# Patient Record
Sex: FEMALE | Race: WHITE | Employment: FULL TIME | URBAN - NONMETROPOLITAN AREA
[De-identification: names, ages, dates, MRNs, and addresses within clinical notes are randomized per-mention and may not be internally consistent; named-entity substitution may affect disease eponyms.]

---

## 2019-06-13 ENCOUNTER — APPOINTMENT (OUTPATIENT)
Dept: GENERAL RADIOLOGY | Age: 39
End: 2019-06-13
Payer: COMMERCIAL

## 2019-06-13 ENCOUNTER — APPOINTMENT (OUTPATIENT)
Dept: CT IMAGING | Age: 39
End: 2019-06-13
Payer: COMMERCIAL

## 2019-06-13 ENCOUNTER — HOSPITAL ENCOUNTER (EMERGENCY)
Age: 39
Discharge: HOME | End: 2019-06-14
Attending: EMERGENCY MEDICINE
Payer: COMMERCIAL

## 2019-06-13 DIAGNOSIS — K29.90 GASTRITIS AND DUODENITIS: Primary | ICD-10-CM

## 2019-06-13 LAB
ABSOLUTE EOS #: 1.4 K/UL (ref 0–0.4)
ABSOLUTE IMMATURE GRANULOCYTE: ABNORMAL K/UL (ref 0–0.3)
ABSOLUTE LYMPH #: 2.4 K/UL (ref 1–4.8)
ABSOLUTE MONO #: 1 K/UL (ref 0.1–1.2)
ALBUMIN SERPL-MCNC: 4 G/DL (ref 3.5–5.2)
ALBUMIN/GLOBULIN RATIO: 1.5 (ref 1–2.5)
ALP BLD-CCNC: 104 U/L (ref 35–104)
ALT SERPL-CCNC: 13 U/L (ref 5–33)
AMYLASE: 34 U/L (ref 28–100)
ANION GAP SERPL CALCULATED.3IONS-SCNC: 13 MMOL/L (ref 9–17)
AST SERPL-CCNC: 25 U/L
BASOPHILS # BLD: 1 % (ref 0–1)
BASOPHILS ABSOLUTE: 0.1 K/UL (ref 0–0.2)
BILIRUB SERPL-MCNC: 0.29 MG/DL (ref 0.3–1.2)
BILIRUBIN DIRECT: 0.08 MG/DL
BILIRUBIN, INDIRECT: 0.21 MG/DL (ref 0–1)
BUN BLDV-MCNC: 11 MG/DL (ref 6–20)
BUN/CREAT BLD: 17 (ref 9–20)
CALCIUM SERPL-MCNC: 8.7 MG/DL (ref 8.6–10.4)
CHLORIDE BLD-SCNC: 102 MMOL/L (ref 98–107)
CO2: 23 MMOL/L (ref 20–31)
CREAT SERPL-MCNC: 0.63 MG/DL (ref 0.5–0.9)
DIFFERENTIAL TYPE: ABNORMAL
EOSINOPHILS RELATIVE PERCENT: 9 % (ref 1–7)
GFR AFRICAN AMERICAN: >60 ML/MIN
GFR NON-AFRICAN AMERICAN: >60 ML/MIN
GFR SERPL CREATININE-BSD FRML MDRD: ABNORMAL ML/MIN/{1.73_M2}
GFR SERPL CREATININE-BSD FRML MDRD: ABNORMAL ML/MIN/{1.73_M2}
GLOBULIN: 2.7 G/DL (ref 1.5–3.8)
GLUCOSE BLD-MCNC: 122 MG/DL (ref 70–99)
HCG QUALITATIVE: NEGATIVE
HCT VFR BLD CALC: 39.3 % (ref 36–46)
HEMOGLOBIN: 13.4 G/DL (ref 12–16)
IMMATURE GRANULOCYTES: ABNORMAL %
LIPASE: 45 U/L (ref 13–60)
LYMPHOCYTES # BLD: 17 % (ref 16–46)
MCH RBC QN AUTO: 29.8 PG (ref 26–34)
MCHC RBC AUTO-ENTMCNC: 34.1 G/DL (ref 31–37)
MCV RBC AUTO: 87.4 FL (ref 80–100)
MONOCYTES # BLD: 7 % (ref 4–11)
NRBC AUTOMATED: ABNORMAL PER 100 WBC
PDW BLD-RTO: 13.2 % (ref 11–14.5)
PLATELET # BLD: 306 K/UL (ref 140–450)
PLATELET ESTIMATE: ABNORMAL
PMV BLD AUTO: 7.8 FL (ref 6–12)
POTASSIUM SERPL-SCNC: 3.9 MMOL/L (ref 3.7–5.3)
RBC # BLD: 4.5 M/UL (ref 4–5.2)
RBC # BLD: ABNORMAL 10*6/UL
SEG NEUTROPHILS: 66 % (ref 43–77)
SEGMENTED NEUTROPHILS ABSOLUTE COUNT: 9.8 K/UL (ref 1.8–7.7)
SODIUM BLD-SCNC: 138 MMOL/L (ref 135–144)
TOTAL PROTEIN: 6.7 G/DL (ref 6.4–8.3)
TROPONIN INTERP: NORMAL
TROPONIN T: NORMAL NG/ML
TROPONIN, HIGH SENSITIVITY: <6 NG/L (ref 0–14)
WBC # BLD: 14.7 K/UL (ref 3.5–11)
WBC # BLD: ABNORMAL 10*3/UL

## 2019-06-13 PROCEDURE — 81001 URINALYSIS AUTO W/SCOPE: CPT

## 2019-06-13 PROCEDURE — 82150 ASSAY OF AMYLASE: CPT

## 2019-06-13 PROCEDURE — 80076 HEPATIC FUNCTION PANEL: CPT

## 2019-06-13 PROCEDURE — 93005 ELECTROCARDIOGRAM TRACING: CPT | Performed by: EMERGENCY MEDICINE

## 2019-06-13 PROCEDURE — 2709999900 CT ABDOMEN PELVIS W IV CONTRAST

## 2019-06-13 PROCEDURE — 36415 COLL VENOUS BLD VENIPUNCTURE: CPT

## 2019-06-13 PROCEDURE — 2580000003 HC RX 258: Performed by: EMERGENCY MEDICINE

## 2019-06-13 PROCEDURE — 71045 X-RAY EXAM CHEST 1 VIEW: CPT

## 2019-06-13 PROCEDURE — 80048 BASIC METABOLIC PNL TOTAL CA: CPT

## 2019-06-13 PROCEDURE — 84703 CHORIONIC GONADOTROPIN ASSAY: CPT

## 2019-06-13 PROCEDURE — 96375 TX/PRO/DX INJ NEW DRUG ADDON: CPT

## 2019-06-13 PROCEDURE — 84484 ASSAY OF TROPONIN QUANT: CPT

## 2019-06-13 PROCEDURE — 6360000002 HC RX W HCPCS: Performed by: EMERGENCY MEDICINE

## 2019-06-13 PROCEDURE — 83690 ASSAY OF LIPASE: CPT

## 2019-06-13 PROCEDURE — 6360000004 HC RX CONTRAST MEDICATION: Performed by: EMERGENCY MEDICINE

## 2019-06-13 PROCEDURE — 99284 EMERGENCY DEPT VISIT MOD MDM: CPT

## 2019-06-13 PROCEDURE — 96374 THER/PROPH/DIAG INJ IV PUSH: CPT

## 2019-06-13 PROCEDURE — 85025 COMPLETE CBC W/AUTO DIFF WBC: CPT

## 2019-06-13 RX ORDER — ONDANSETRON 2 MG/ML
4 INJECTION INTRAMUSCULAR; INTRAVENOUS ONCE
Status: COMPLETED | OUTPATIENT
Start: 2019-06-13 | End: 2019-06-13

## 2019-06-13 RX ORDER — MORPHINE SULFATE 4 MG/ML
4 INJECTION, SOLUTION INTRAMUSCULAR; INTRAVENOUS ONCE
Status: COMPLETED | OUTPATIENT
Start: 2019-06-13 | End: 2019-06-13

## 2019-06-13 RX ORDER — 0.9 % SODIUM CHLORIDE 0.9 %
1000 INTRAVENOUS SOLUTION INTRAVENOUS ONCE
Status: COMPLETED | OUTPATIENT
Start: 2019-06-13 | End: 2019-06-14

## 2019-06-13 RX ADMIN — IOPAMIDOL 100 ML: 755 INJECTION, SOLUTION INTRAVENOUS at 23:50

## 2019-06-13 RX ADMIN — SODIUM CHLORIDE 1000 ML: 9 INJECTION, SOLUTION INTRAVENOUS at 22:59

## 2019-06-13 RX ADMIN — MORPHINE SULFATE 4 MG: 4 INJECTION INTRAVENOUS at 22:58

## 2019-06-13 RX ADMIN — ONDANSETRON 4 MG: 2 INJECTION INTRAMUSCULAR; INTRAVENOUS at 22:58

## 2019-06-13 ASSESSMENT — PAIN DESCRIPTION - PAIN TYPE: TYPE: ACUTE PAIN

## 2019-06-13 ASSESSMENT — PAIN SCALES - GENERAL
PAINLEVEL_OUTOF10: 9
PAINLEVEL_OUTOF10: 4

## 2019-06-13 ASSESSMENT — PAIN DESCRIPTION - LOCATION: LOCATION: ABDOMEN

## 2019-06-13 ASSESSMENT — PAIN DESCRIPTION - ORIENTATION: ORIENTATION: UPPER

## 2019-06-13 ASSESSMENT — PAIN DESCRIPTION - PROGRESSION: CLINICAL_PROGRESSION: GRADUALLY IMPROVING

## 2019-06-14 VITALS
RESPIRATION RATE: 18 BRPM | OXYGEN SATURATION: 98 % | HEART RATE: 89 BPM | DIASTOLIC BLOOD PRESSURE: 63 MMHG | WEIGHT: 263 LBS | TEMPERATURE: 97.9 F | SYSTOLIC BLOOD PRESSURE: 140 MMHG | HEIGHT: 67 IN | BODY MASS INDEX: 41.28 KG/M2

## 2019-06-14 LAB
-: NORMAL
AMORPHOUS: NORMAL
BACTERIA: NORMAL
BILIRUBIN URINE: NEGATIVE
CASTS UA: NORMAL /LPF (ref 0–2)
COLOR: ABNORMAL
COMMENT UA: ABNORMAL
CRYSTALS, UA: NORMAL /HPF
EKG ATRIAL RATE: 101 BPM
EKG P AXIS: 45 DEGREES
EKG P-R INTERVAL: 126 MS
EKG Q-T INTERVAL: 348 MS
EKG QRS DURATION: 82 MS
EKG QTC CALCULATION (BAZETT): 451 MS
EKG R AXIS: 42 DEGREES
EKG T AXIS: 29 DEGREES
EKG VENTRICULAR RATE: 101 BPM
EPITHELIAL CELLS UA: NORMAL /HPF (ref 0–5)
GLUCOSE URINE: NEGATIVE
KETONES, URINE: NEGATIVE
LEUKOCYTE ESTERASE, URINE: NEGATIVE
MUCUS: NORMAL
NITRITE, URINE: NEGATIVE
OTHER OBSERVATIONS UA: NORMAL
PH UA: 7 (ref 5–6)
PROTEIN UA: NEGATIVE
RBC UA: NORMAL /HPF (ref 0–4)
RENAL EPITHELIAL, UA: NORMAL /HPF
SPECIFIC GRAVITY UA: 1.02 (ref 1.01–1.02)
TRICHOMONAS: NORMAL
TURBIDITY: ABNORMAL
URINE HGB: NEGATIVE
UROBILINOGEN, URINE: NORMAL
WBC UA: NORMAL /HPF (ref 0–4)
YEAST: NORMAL

## 2019-06-14 PROCEDURE — C9113 INJ PANTOPRAZOLE SODIUM, VIA: HCPCS | Performed by: EMERGENCY MEDICINE

## 2019-06-14 PROCEDURE — 6360000002 HC RX W HCPCS: Performed by: EMERGENCY MEDICINE

## 2019-06-14 PROCEDURE — 2580000003 HC RX 258: Performed by: EMERGENCY MEDICINE

## 2019-06-14 PROCEDURE — 6370000000 HC RX 637 (ALT 250 FOR IP): Performed by: EMERGENCY MEDICINE

## 2019-06-14 PROCEDURE — 96375 TX/PRO/DX INJ NEW DRUG ADDON: CPT

## 2019-06-14 RX ORDER — HYDROCODONE BITARTRATE AND ACETAMINOPHEN 5; 325 MG/1; MG/1
1 TABLET ORAL EVERY 6 HOURS PRN
Qty: 20 TABLET | Refills: 0 | Status: SHIPPED | OUTPATIENT
Start: 2019-06-14 | End: 2019-06-17

## 2019-06-14 RX ORDER — KETOROLAC TROMETHAMINE 30 MG/ML
15 INJECTION, SOLUTION INTRAMUSCULAR; INTRAVENOUS ONCE
Status: COMPLETED | OUTPATIENT
Start: 2019-06-14 | End: 2019-06-14

## 2019-06-14 RX ORDER — KETOROLAC TROMETHAMINE 30 MG/ML
INJECTION, SOLUTION INTRAMUSCULAR; INTRAVENOUS
Status: DISCONTINUED
Start: 2019-06-14 | End: 2019-06-14 | Stop reason: HOSPADM

## 2019-06-14 RX ORDER — PANTOPRAZOLE SODIUM 40 MG/10ML
40 INJECTION, POWDER, LYOPHILIZED, FOR SOLUTION INTRAVENOUS ONCE
Status: COMPLETED | OUTPATIENT
Start: 2019-06-14 | End: 2019-06-14

## 2019-06-14 RX ORDER — PANTOPRAZOLE SODIUM 20 MG/1
40 TABLET, DELAYED RELEASE ORAL DAILY
Qty: 30 TABLET | Refills: 0 | Status: SHIPPED | OUTPATIENT
Start: 2019-06-14 | End: 2020-12-13 | Stop reason: ALTCHOICE

## 2019-06-14 RX ORDER — 0.9 % SODIUM CHLORIDE 0.9 %
10 VIAL (ML) INJECTION ONCE
Status: COMPLETED | OUTPATIENT
Start: 2019-06-14 | End: 2019-06-14

## 2019-06-14 RX ADMIN — Medication 10 ML: at 00:42

## 2019-06-14 RX ADMIN — PANTOPRAZOLE SODIUM 40 MG: 40 INJECTION, POWDER, FOR SOLUTION INTRAVENOUS at 00:42

## 2019-06-14 RX ADMIN — KETOROLAC TROMETHAMINE 15 MG: 30 INJECTION, SOLUTION INTRAMUSCULAR at 00:05

## 2019-06-14 RX ADMIN — LIDOCAINE HYDROCHLORIDE: 20 SOLUTION ORAL; TOPICAL at 00:16

## 2019-06-14 NOTE — ED TRIAGE NOTES
To ED 5 per self with steady gait, reports vomiting at 1030am and then dry heaves throughout the day with upper abd pain.

## 2020-12-13 ENCOUNTER — HOSPITAL ENCOUNTER (OUTPATIENT)
Age: 40
Setting detail: SPECIMEN
Discharge: HOME | End: 2020-12-13
Payer: COMMERCIAL

## 2020-12-13 ENCOUNTER — OFFICE VISIT (OUTPATIENT)
Dept: PRIMARY CARE CLINIC | Age: 40
End: 2020-12-13
Payer: COMMERCIAL

## 2020-12-13 VITALS
BODY MASS INDEX: 45.99 KG/M2 | TEMPERATURE: 97.3 F | WEIGHT: 293 LBS | OXYGEN SATURATION: 99 % | DIASTOLIC BLOOD PRESSURE: 80 MMHG | HEIGHT: 67 IN | HEART RATE: 98 BPM | SYSTOLIC BLOOD PRESSURE: 120 MMHG

## 2020-12-13 PROCEDURE — U0003 INFECTIOUS AGENT DETECTION BY NUCLEIC ACID (DNA OR RNA); SEVERE ACUTE RESPIRATORY SYNDROME CORONAVIRUS 2 (SARS-COV-2) (CORONAVIRUS DISEASE [COVID-19]), AMPLIFIED PROBE TECHNIQUE, MAKING USE OF HIGH THROUGHPUT TECHNOLOGIES AS DESCRIBED BY CMS-2020-01-R: HCPCS

## 2020-12-13 PROCEDURE — 99203 OFFICE O/P NEW LOW 30 MIN: CPT | Performed by: FAMILY MEDICINE

## 2020-12-13 ASSESSMENT — ENCOUNTER SYMPTOMS
SHORTNESS OF BREATH: 0
DIARRHEA: 0
COUGH: 1
SORE THROAT: 1
EYE DISCHARGE: 0
SINUS COMPLAINT: 1
ABDOMINAL PAIN: 0
CONSTIPATION: 0
TROUBLE SWALLOWING: 0
WHEEZING: 0
RHINORRHEA: 1
VOMITING: 0
SINUS PAIN: 1
SINUS PRESSURE: 1
EYE REDNESS: 0
NAUSEA: 0

## 2020-12-13 NOTE — PROGRESS NOTES
2020     Suzette Ford (:  1980) is a 38 y.o. female, here for evaluation of the following medical concerns:    Sinus Problem  This is a new problem. The current episode started yesterday (started feeling ill last  night). The problem has been gradually worsening since onset. The maximum temperature recorded prior to her arrival was 100.4 - 100.9 F. Associated symptoms include congestion, coughing (dry cough), headaches, sinus pressure and a sore throat. Pertinent negatives include no chills, ear pain, neck pain or shortness of breath. (Has had loss of taste and smell today. No NVD) Past treatments include acetaminophen (delsym). The treatment provided mild relief. Did review patient's med list, allergies, social history,pmhx and pshx today as noted in the record. Review of Systems   Constitutional: Positive for fatigue. Negative for chills and fever. HENT: Positive for congestion, postnasal drip, rhinorrhea, sinus pressure, sinus pain and sore throat. Negative for ear pain and trouble swallowing. Eyes: Negative for discharge and redness. Respiratory: Positive for cough (dry cough). Negative for shortness of breath and wheezing. Cardiovascular: Negative for chest pain. Gastrointestinal: Negative for abdominal pain, constipation, diarrhea, nausea and vomiting. Genitourinary: Negative for dysuria, flank pain, frequency and urgency. Musculoskeletal: Negative for arthralgias, myalgias and neck pain. Skin: Negative for rash and wound. Allergic/Immunologic: Negative for environmental allergies. Neurological: Positive for headaches. Negative for dizziness, weakness and light-headedness. Hematological: Negative for adenopathy. Psychiatric/Behavioral: Negative.         Prior to Visit Medications    Not on File        Social History     Tobacco Use    Smoking status: Never Smoker    Smokeless tobacco: Never Used   Substance Use Topics    Alcohol use: Not Currently Vitals:    12/13/20 1415   BP: 120/80   Site: Left Upper Arm   Position: Sitting   Cuff Size: Large Adult   Pulse: 98   Temp: 97.3 °F (36.3 °C)   TempSrc: Temporal   SpO2: 99%   Weight: (!) 305 lb (138.3 kg)   Height: 5' 7\" (1.702 m)     Estimated body mass index is 47.77 kg/m² as calculated from the following:    Height as of this encounter: 5' 7\" (1.702 m). Weight as of this encounter: 305 lb (138.3 kg). Physical Exam  Vitals signs and nursing note reviewed. Constitutional:       General: She is not in acute distress. Appearance: Normal appearance. She is well-developed. She is not diaphoretic. HENT:      Head: Normocephalic and atraumatic. Right Ear: External ear normal.      Left Ear: External ear normal.      Ears:      Comments: TMs dull with fluid behind the TM     Nose: Congestion and rhinorrhea present. Mouth/Throat:      Pharynx: No posterior oropharyngeal erythema. Comments: Post nasal drainage noted  Eyes:      General: No scleral icterus. Right eye: No discharge. Left eye: No discharge. Conjunctiva/sclera: Conjunctivae normal.      Pupils: Pupils are equal, round, and reactive to light. Neck:      Musculoskeletal: Normal range of motion and neck supple. Thyroid: No thyromegaly. Cardiovascular:      Rate and Rhythm: Normal rate and regular rhythm. Heart sounds: Normal heart sounds. Pulmonary:      Effort: Pulmonary effort is normal. No respiratory distress. Breath sounds: Normal breath sounds. No wheezing. Lymphadenopathy:      Cervical: Cervical adenopathy present. Skin:     General: Skin is warm and dry. Findings: No rash. Neurological:      Mental Status: She is alert and oriented to person, place, and time. Psychiatric:         Behavior: Behavior normal.         Thought Content: Thought content normal.         Judgment: Judgment normal.         ASSESSMENT/PLAN:    Encounter Diagnosis   Name Primary?  Viral syndrome Yes     No orders of the defined types were placed in this encounter. Orders Placed This Encounter   Procedures    COVID-19 Ambulatory     Standing Status:   Future     Standing Expiration Date:   12/13/2021     Scheduling Instructions:      Saline media preferred given current shortage of viral transport media but both acceptable     Order Specific Question:   Is this test for diagnosis or screening? Answer:   Diagnosis of ill patient     Order Specific Question:   Symptomatic for COVID-19 as defined by CDC? Answer:   Yes     Order Specific Question:   Date of Symptom Onset     Answer:   12/12/2020     Order Specific Question:   Hospitalized for COVID-19? Answer:   No     Order Specific Question:   Admitted to ICU for COVID-19? Answer:   No     Order Specific Question:   Employed in healthcare setting? Answer:   No     Order Specific Question:   Resident in a congregate (group) care setting? Answer:   No     Order Specific Question:   Pregnant? Answer:   No     Order Specific Question:   Previously tested for COVID-19? Answer:   No     Will order covid testing. In interim patient is to quarantine until testing reports are available    Increase fluids and rest    Tylenol 1-2 tabs po q4h prn    Can use mucinex or mucinex DM or comparable for congestion or cough. Return  if no improvement in symptoms or if any further symptoms arise. No follow-ups on file. An electronic signature was used to authenticate this note.     --Markham Gaucher,  on 12/13/2020 at 2:31 PM

## 2020-12-16 ENCOUNTER — TELEPHONE (OUTPATIENT)
Dept: PRIMARY CARE CLINIC | Age: 40
End: 2020-12-16

## 2020-12-16 LAB — SARS-COV-2, NAA: DETECTED

## 2020-12-16 NOTE — TELEPHONE ENCOUNTER
Positive covid results given. Patient would like results faxed to [phone], attn: Liang Branson.

## 2020-12-17 ENCOUNTER — TELEPHONE (OUTPATIENT)
Dept: ADMINISTRATIVE | Age: 40
End: 2020-12-17

## 2024-05-17 ENCOUNTER — HOSPITAL ENCOUNTER (OUTPATIENT)
Age: 44
Setting detail: SPECIMEN
Discharge: HOME | End: 2024-05-17
Payer: COMMERCIAL

## 2024-05-17 ENCOUNTER — OFFICE VISIT (OUTPATIENT)
Dept: PRIMARY CARE CLINIC | Age: 44
End: 2024-05-17
Payer: COMMERCIAL

## 2024-05-17 ENCOUNTER — HOSPITAL ENCOUNTER (EMERGENCY)
Age: 44
Discharge: ANOTHER HOSPITAL | End: 2024-05-18
Attending: EMERGENCY MEDICINE
Payer: COMMERCIAL

## 2024-05-17 ENCOUNTER — HOSPITAL ENCOUNTER (OUTPATIENT)
Dept: CT IMAGING | Age: 44
Discharge: HOME | End: 2024-05-17
Payer: COMMERCIAL

## 2024-05-17 VITALS
OXYGEN SATURATION: 99 % | WEIGHT: 270.6 LBS | TEMPERATURE: 99.5 F | DIASTOLIC BLOOD PRESSURE: 77 MMHG | SYSTOLIC BLOOD PRESSURE: 172 MMHG | HEART RATE: 105 BPM | BODY MASS INDEX: 42.38 KG/M2

## 2024-05-17 DIAGNOSIS — R10.9 FLANK PAIN: ICD-10-CM

## 2024-05-17 DIAGNOSIS — R10.2 PELVIC PAIN: Primary | ICD-10-CM

## 2024-05-17 DIAGNOSIS — R10.9 FLANK PAIN: Primary | ICD-10-CM

## 2024-05-17 DIAGNOSIS — R50.9 FEVER, UNSPECIFIED FEVER CAUSE: ICD-10-CM

## 2024-05-17 DIAGNOSIS — R93.89 THICKENED ENDOMETRIUM: ICD-10-CM

## 2024-05-17 DIAGNOSIS — R00.0 TACHYCARDIA: ICD-10-CM

## 2024-05-17 LAB
ALBUMIN SERPL-MCNC: 4.3 G/DL (ref 3.5–5.2)
ALBUMIN/GLOB SERPL: 1.3 {RATIO} (ref 1–2.5)
ALP SERPL-CCNC: 113 U/L (ref 35–104)
ALT SERPL-CCNC: 5 U/L (ref 5–33)
ANION GAP SERPL CALCULATED.3IONS-SCNC: 15 MMOL/L (ref 9–17)
AST SERPL-CCNC: 15 U/L
BACTERIA URNS QL MICRO: ABNORMAL
BASOPHILS # BLD: 0.06 K/UL (ref 0–0.2)
BASOPHILS NFR BLD: 1 % (ref 0–2)
BILIRUB SERPL-MCNC: 0.3 MG/DL (ref 0.3–1.2)
BILIRUB UR QL STRIP: ABNORMAL
BUN SERPL-MCNC: 12 MG/DL (ref 6–20)
BUN/CREAT SERPL: 9 (ref 9–20)
CALCIUM SERPL-MCNC: 9.4 MG/DL (ref 8.6–10.4)
CHARACTER UR: ABNORMAL
CHLORIDE SERPL-SCNC: 99 MMOL/L (ref 98–107)
CLARITY UR: CLEAR
CO2 SERPL-SCNC: 23 MMOL/L (ref 20–31)
COLOR UR: YELLOW
CREAT SERPL-MCNC: 1.3 MG/DL (ref 0.5–0.9)
EOSINOPHIL # BLD: 0.1 K/UL (ref 0–0.44)
EOSINOPHILS RELATIVE PERCENT: 1 % (ref 1–4)
EPI CELLS #/AREA URNS HPF: ABNORMAL /HPF (ref 0–5)
ERYTHROCYTE [DISTWIDTH] IN BLOOD BY AUTOMATED COUNT: 18.8 % (ref 11.8–14.4)
GFR, ESTIMATED: 52 ML/MIN/1.73M2
GLUCOSE SERPL-MCNC: 101 MG/DL (ref 70–99)
GLUCOSE UR STRIP-MCNC: NEGATIVE MG/DL
HCT VFR BLD AUTO: 30.3 % (ref 36.3–47.1)
HGB BLD-MCNC: 9 G/DL (ref 11.9–15.1)
HGB UR QL STRIP.AUTO: ABNORMAL
IMM GRANULOCYTES # BLD AUTO: 0.03 K/UL (ref 0–0.3)
IMM GRANULOCYTES NFR BLD: 0 %
KETONES UR STRIP-MCNC: ABNORMAL MG/DL
LACTATE BLDV-SCNC: 0.7 MMOL/L (ref 0.5–2.2)
LEUKOCYTE ESTERASE UR QL STRIP: NEGATIVE
LIPASE SERPL-CCNC: 31 U/L (ref 13–60)
LYMPHOCYTES NFR BLD: 2.35 K/UL (ref 1.1–3.7)
LYMPHOCYTES RELATIVE PERCENT: 19 % (ref 24–43)
MCH RBC QN AUTO: 19.4 PG (ref 25.2–33.5)
MCHC RBC AUTO-ENTMCNC: 29.7 G/DL (ref 25.2–33.5)
MCV RBC AUTO: 65.2 FL (ref 82.6–102.9)
MONOCYTES NFR BLD: 0.92 K/UL (ref 0.1–1.2)
MONOCYTES NFR BLD: 7 % (ref 3–12)
MUCOUS THREADS URNS QL MICRO: ABNORMAL
NEUTROPHILS NFR BLD: 72 % (ref 36–65)
NEUTS SEG NFR BLD: 8.95 K/UL (ref 1.5–8.1)
NITRITE UR QL STRIP: NEGATIVE
NRBC BLD-RTO: 0 PER 100 WBC
PH UR STRIP: 5.5 [PH] (ref 5–6)
PLATELET # BLD AUTO: 397 K/UL (ref 138–453)
PMV BLD AUTO: 8.4 FL (ref 8.1–13.5)
POTASSIUM SERPL-SCNC: 3.7 MMOL/L (ref 3.7–5.3)
PROT SERPL-MCNC: 7.6 G/DL (ref 6.4–8.3)
PROT UR STRIP-MCNC: ABNORMAL MG/DL
RBC # BLD AUTO: 4.65 M/UL (ref 3.95–5.11)
RBC # BLD: ABNORMAL 10*6/UL
RBC #/AREA URNS HPF: ABNORMAL /HPF (ref 0–4)
SODIUM SERPL-SCNC: 137 MMOL/L (ref 135–144)
SP GR UR STRIP: 1.02 (ref 1.01–1.02)
UROBILINOGEN UR STRIP-ACNC: NORMAL EU/DL (ref 0–1)
WBC #/AREA URNS HPF: ABNORMAL /HPF (ref 0–4)
WBC OTHER # BLD: 12.4 K/UL (ref 3.5–11.3)

## 2024-05-17 PROCEDURE — 74176 CT ABD & PELVIS W/O CONTRAST: CPT

## 2024-05-17 PROCEDURE — 83690 ASSAY OF LIPASE: CPT

## 2024-05-17 PROCEDURE — 96361 HYDRATE IV INFUSION ADD-ON: CPT

## 2024-05-17 PROCEDURE — 99203 OFFICE O/P NEW LOW 30 MIN: CPT

## 2024-05-17 PROCEDURE — 96372 THER/PROPH/DIAG INJ SC/IM: CPT

## 2024-05-17 PROCEDURE — 36415 COLL VENOUS BLD VENIPUNCTURE: CPT

## 2024-05-17 PROCEDURE — 6360000002 HC RX W HCPCS: Performed by: EMERGENCY MEDICINE

## 2024-05-17 PROCEDURE — 96365 THER/PROPH/DIAG IV INF INIT: CPT

## 2024-05-17 PROCEDURE — 83605 ASSAY OF LACTIC ACID: CPT

## 2024-05-17 PROCEDURE — 80053 COMPREHEN METABOLIC PANEL: CPT

## 2024-05-17 PROCEDURE — 96375 TX/PRO/DX INJ NEW DRUG ADDON: CPT

## 2024-05-17 PROCEDURE — 93005 ELECTROCARDIOGRAM TRACING: CPT | Performed by: EMERGENCY MEDICINE

## 2024-05-17 PROCEDURE — 2580000003 HC RX 258: Performed by: EMERGENCY MEDICINE

## 2024-05-17 PROCEDURE — 81001 URINALYSIS AUTO W/SCOPE: CPT

## 2024-05-17 PROCEDURE — 99285 EMERGENCY DEPT VISIT HI MDM: CPT

## 2024-05-17 PROCEDURE — 85025 COMPLETE CBC W/AUTO DIFF WBC: CPT

## 2024-05-17 RX ORDER — LORAZEPAM 2 MG/ML
0.5 INJECTION INTRAMUSCULAR ONCE
Status: COMPLETED | OUTPATIENT
Start: 2024-05-17 | End: 2024-05-17

## 2024-05-17 RX ORDER — ONDANSETRON 2 MG/ML
4 INJECTION INTRAMUSCULAR; INTRAVENOUS ONCE
Status: COMPLETED | OUTPATIENT
Start: 2024-05-17 | End: 2024-05-17

## 2024-05-17 RX ORDER — MORPHINE SULFATE 2 MG/ML
2 INJECTION, SOLUTION INTRAMUSCULAR; INTRAVENOUS ONCE
Status: COMPLETED | OUTPATIENT
Start: 2024-05-17 | End: 2024-05-17

## 2024-05-17 RX ORDER — 0.9 % SODIUM CHLORIDE 0.9 %
1000 INTRAVENOUS SOLUTION INTRAVENOUS ONCE
Status: COMPLETED | OUTPATIENT
Start: 2024-05-17 | End: 2024-05-17

## 2024-05-17 RX ORDER — KETOROLAC TROMETHAMINE 30 MG/ML
30 INJECTION, SOLUTION INTRAMUSCULAR; INTRAVENOUS ONCE
Status: COMPLETED | OUTPATIENT
Start: 2024-05-17 | End: 2024-05-17

## 2024-05-17 RX ADMIN — MORPHINE SULFATE 2 MG: 2 INJECTION, SOLUTION INTRAMUSCULAR; INTRAVENOUS at 21:50

## 2024-05-17 RX ADMIN — LORAZEPAM 0.5 MG: 2 INJECTION INTRAMUSCULAR; INTRAVENOUS at 23:23

## 2024-05-17 RX ADMIN — SODIUM CHLORIDE 1000 ML: 9 INJECTION, SOLUTION INTRAVENOUS at 21:47

## 2024-05-17 RX ADMIN — ONDANSETRON 4 MG: 2 INJECTION INTRAMUSCULAR; INTRAVENOUS at 21:48

## 2024-05-17 RX ADMIN — KETOROLAC TROMETHAMINE 30 MG: 30 INJECTION, SOLUTION INTRAMUSCULAR; INTRAVENOUS at 19:11

## 2024-05-17 ASSESSMENT — PAIN DESCRIPTION - PAIN TYPE: TYPE: ACUTE PAIN

## 2024-05-17 ASSESSMENT — PAIN DESCRIPTION - ORIENTATION: ORIENTATION: LOWER

## 2024-05-17 ASSESSMENT — ENCOUNTER SYMPTOMS: BOWEL INCONTINENCE: 0

## 2024-05-17 ASSESSMENT — PAIN SCALES - GENERAL: PAINLEVEL_OUTOF10: 5

## 2024-05-17 ASSESSMENT — PAIN DESCRIPTION - LOCATION: LOCATION: ABDOMEN

## 2024-05-17 ASSESSMENT — PAIN - FUNCTIONAL ASSESSMENT: PAIN_FUNCTIONAL_ASSESSMENT: 0-10

## 2024-05-17 NOTE — PROGRESS NOTES
[facility]  [address] [facility], [place] [address]  Phone: [phone]  Fax: [phone]      Evangelina King is a 44 y.o. female who presents to the [facility] today for her medical conditions/complaints as noted below. Evangelina King is c/o of Flank Pain (Flank pain. Trouble urinating, feels like she cannot empty her bladder. Had some blood clots )          HPI:     Flank Pain  This is a new problem. The current episode started in the past 7 days. The problem occurs constantly. The problem has been gradually worsening since onset. The pain is present in the lumbar spine. The quality of the pain is described as aching. The pain is at a severity of 9/10. The pain is severe. Pertinent negatives include no bladder incontinence, bowel incontinence, dysuria, fever, paresis, paresthesias, perianal numbness or tingling. She has tried nothing for the symptoms. The treatment provided no relief.       Past Medical History:   Diagnosis Date    Cancer (HCC)     cervical 9 years ago        No Known Allergies    Wt Readings from Last 3 Encounters:   05/17/24 122.7 kg (270 lb 9.6 oz)   12/13/20 (!) 138.3 kg (305 lb)   06/13/19 119.3 kg (263 lb)     BP Readings from Last 3 Encounters:   05/17/24 (!) 172/77   12/13/20 120/80   06/14/19 (!) 140/63      Temp Readings from Last 3 Encounters:   05/17/24 99.5 °F (37.5 °C) (Tympanic)   12/13/20 97.3 °F (36.3 °C) (Temporal)   06/13/19 97.9 °F (36.6 °C) (Tympanic)     Pulse Readings from Last 3 Encounters:   05/17/24 (!) 105   12/13/20 98   06/14/19 89     SpO2 Readings from Last 3 Encounters:   05/17/24 99%   12/13/20 99%   06/14/19 98%       Subjective:      Review of Systems   Constitutional:  Negative for fever.   Gastrointestinal:  Negative for bowel incontinence.   Genitourinary:  Positive for difficulty urinating, flank pain, hematuria and menstrual problem (reports going through menopause). Negative for

## 2024-05-18 ENCOUNTER — APPOINTMENT (OUTPATIENT)
Dept: MRI IMAGING | Age: 44
DRG: 744 | End: 2024-05-18
Attending: OBSTETRICS & GYNECOLOGY
Payer: COMMERCIAL

## 2024-05-18 ENCOUNTER — HOSPITAL ENCOUNTER (INPATIENT)
Age: 44
LOS: 5 days | Discharge: HOME HEALTH | DRG: 744 | End: 2024-05-23
Attending: OBSTETRICS & GYNECOLOGY | Admitting: OBSTETRICS & GYNECOLOGY
Payer: COMMERCIAL

## 2024-05-18 ENCOUNTER — APPOINTMENT (OUTPATIENT)
Dept: ULTRASOUND IMAGING | Age: 44
DRG: 744 | End: 2024-05-18
Attending: OBSTETRICS & GYNECOLOGY
Payer: COMMERCIAL

## 2024-05-18 VITALS
OXYGEN SATURATION: 95 % | HEIGHT: 67 IN | DIASTOLIC BLOOD PRESSURE: 61 MMHG | TEMPERATURE: 101.8 F | BODY MASS INDEX: 42.53 KG/M2 | SYSTOLIC BLOOD PRESSURE: 149 MMHG | RESPIRATION RATE: 20 BRPM | HEART RATE: 122 BPM | WEIGHT: 271 LBS

## 2024-05-18 DIAGNOSIS — N13.9 OBSTRUCTIVE UROPATHY: ICD-10-CM

## 2024-05-18 DIAGNOSIS — Z98.890 POST-OPERATIVE STATE: Primary | ICD-10-CM

## 2024-05-18 DIAGNOSIS — C53.8 MALIGNANT NEOPLASM OF OVERLAPPING SITES OF CERVIX (HCC): ICD-10-CM

## 2024-05-18 DIAGNOSIS — R93.89 THICKENED ENDOMETRIUM: ICD-10-CM

## 2024-05-18 PROBLEM — R10.2 PELVIC PAIN: Status: ACTIVE | Noted: 2024-05-18

## 2024-05-18 PROBLEM — R19.00 PELVIC MASS: Status: ACTIVE | Noted: 2024-05-18

## 2024-05-18 LAB
ALBUMIN SERPL-MCNC: 3.4 G/DL (ref 3.5–5.2)
ALBUMIN/GLOB SERPL: 1 {RATIO} (ref 1–2.5)
ALP SERPL-CCNC: 119 U/L (ref 35–104)
ALT SERPL-CCNC: 7 U/L (ref 10–35)
ANION GAP SERPL CALCULATED.3IONS-SCNC: 12 MMOL/L (ref 9–16)
AST SERPL-CCNC: 50 U/L (ref 10–35)
BASOPHILS # BLD: 0 K/UL (ref 0–0.2)
BASOPHILS # BLD: 0 K/UL (ref 0–0.2)
BASOPHILS NFR BLD: 0 % (ref 0–2)
BASOPHILS NFR BLD: 0 % (ref 0–2)
BILIRUB SERPL-MCNC: 0.3 MG/DL (ref 0–1.2)
BUN SERPL-MCNC: 12 MG/DL (ref 6–20)
CALCIUM SERPL-MCNC: 8.6 MG/DL (ref 8.6–10.4)
CANCER AG125 SERPL-ACNC: 65 U/ML (ref 0–38)
CHLORIDE SERPL-SCNC: 103 MMOL/L (ref 98–107)
CO2 SERPL-SCNC: 19 MMOL/L (ref 20–31)
CREAT SERPL-MCNC: 1.5 MG/DL (ref 0.5–0.9)
EOSINOPHIL # BLD: 0 K/UL (ref 0–0.4)
EOSINOPHIL # BLD: 0 K/UL (ref 0–0.44)
EOSINOPHILS RELATIVE PERCENT: 0 % (ref 1–4)
EOSINOPHILS RELATIVE PERCENT: 0 % (ref 1–4)
ERYTHROCYTE [DISTWIDTH] IN BLOOD BY AUTOMATED COUNT: 18.9 % (ref 11.8–14.4)
ERYTHROCYTE [DISTWIDTH] IN BLOOD BY AUTOMATED COUNT: 19.2 % (ref 11.8–14.4)
FERRITIN SERPL-MCNC: 50 NG/ML (ref 13–150)
FOLATE SERPL-MCNC: 8.8 NG/ML (ref 4.8–24.2)
GFR, ESTIMATED: 43 ML/MIN/1.73M2
GLUCOSE SERPL-MCNC: 104 MG/DL (ref 74–99)
HCT VFR BLD AUTO: 27.6 % (ref 36.3–47.1)
HCT VFR BLD AUTO: 29.5 % (ref 36.3–47.1)
HGB BLD-MCNC: 7.7 G/DL (ref 11.9–15.1)
HGB BLD-MCNC: 8.1 G/DL (ref 11.9–15.1)
IMM GRANULOCYTES # BLD AUTO: 0 K/UL (ref 0–0.3)
IMM GRANULOCYTES # BLD AUTO: 0.17 K/UL (ref 0–0.3)
IMM GRANULOCYTES NFR BLD: 0 %
IMM GRANULOCYTES NFR BLD: 1 %
IMM RETICS NFR: 22.7 % (ref 2.7–18.3)
IRON SATN MFR SERPL: 4 % (ref 20–55)
IRON SERPL-MCNC: 10 UG/DL (ref 37–145)
LACTIC ACID, SEPSIS WHOLE BLOOD: 1.1 MMOL/L (ref 0.5–1.9)
LACTIC ACID, SEPSIS WHOLE BLOOD: 1.5 MMOL/L (ref 0.5–1.9)
LACTIC ACID, WHOLE BLOOD: 2.4 MMOL/L (ref 0.7–2.1)
LYMPHOCYTES NFR BLD: 0.54 K/UL (ref 1.1–3.7)
LYMPHOCYTES NFR BLD: 0.68 K/UL (ref 1–4.8)
LYMPHOCYTES RELATIVE PERCENT: 3 % (ref 24–43)
LYMPHOCYTES RELATIVE PERCENT: 4 % (ref 24–44)
MCH RBC QN AUTO: 19.6 PG (ref 25.2–33.5)
MCH RBC QN AUTO: 19.6 PG (ref 25.2–33.5)
MCHC RBC AUTO-ENTMCNC: 27.5 G/DL (ref 28.4–34.8)
MCHC RBC AUTO-ENTMCNC: 27.9 G/DL (ref 28.4–34.8)
MCV RBC AUTO: 70.4 FL (ref 82.6–102.9)
MCV RBC AUTO: 71.4 FL (ref 82.6–102.9)
MICROORGANISM SPEC CULT: ABNORMAL
MONOCYTES NFR BLD: 0.9 K/UL (ref 0.1–1.2)
MONOCYTES NFR BLD: 1.01 K/UL (ref 0.1–0.8)
MONOCYTES NFR BLD: 5 % (ref 3–12)
MONOCYTES NFR BLD: 6 % (ref 1–7)
MORPHOLOGY: ABNORMAL
NEUTROPHILS NFR BLD: 89 % (ref 36–66)
NEUTROPHILS NFR BLD: 92 % (ref 36–65)
NEUTS SEG NFR BLD: 15.04 K/UL (ref 1.8–7.7)
NEUTS SEG NFR BLD: 16.56 K/UL (ref 1.5–8.1)
NRBC BLD-RTO: 0 PER 100 WBC
NRBC BLD-RTO: 0 PER 100 WBC
PLATELET # BLD AUTO: 288 K/UL (ref 138–453)
PLATELET # BLD AUTO: 317 K/UL (ref 138–453)
PMV BLD AUTO: 9.1 FL (ref 8.1–13.5)
PMV BLD AUTO: 9.3 FL (ref 8.1–13.5)
POTASSIUM SERPL-SCNC: 3.5 MMOL/L (ref 3.7–5.3)
PROT SERPL-MCNC: 6.2 G/DL (ref 6.6–8.7)
RBC # BLD AUTO: 3.92 M/UL (ref 3.95–5.11)
RBC # BLD AUTO: 4.13 M/UL (ref 3.95–5.11)
RETIC HEMOGLOBIN: 18.2 PG (ref 28.2–35.7)
RETICS # AUTO: 0.06 M/UL (ref 0.03–0.08)
RETICS/RBC NFR AUTO: 1.5 % (ref 0.5–1.9)
SERVICE CMNT-IMP: ABNORMAL
SODIUM SERPL-SCNC: 134 MMOL/L (ref 136–145)
SPECIMEN DESCRIPTION: ABNORMAL
TIBC SERPL-MCNC: 271 UG/DL (ref 250–450)
UNSATURATED IRON BINDING CAPACITY: 261 UG/DL (ref 112–347)
VIT B12 SERPL-MCNC: 488 PG/ML (ref 232–1245)
WBC OTHER # BLD: 16.9 K/UL (ref 3.5–11.3)
WBC OTHER # BLD: 18 K/UL (ref 3.5–11.3)

## 2024-05-18 PROCEDURE — 99255 IP/OBS CONSLTJ NEW/EST HI 80: CPT | Performed by: INTERNAL MEDICINE

## 2024-05-18 PROCEDURE — 82607 VITAMIN B-12: CPT

## 2024-05-18 PROCEDURE — 85045 AUTOMATED RETICULOCYTE COUNT: CPT

## 2024-05-18 PROCEDURE — 87076 CULTURE ANAEROBE IDENT EACH: CPT

## 2024-05-18 PROCEDURE — 76856 US EXAM PELVIC COMPLETE: CPT

## 2024-05-18 PROCEDURE — 87077 CULTURE AEROBIC IDENTIFY: CPT

## 2024-05-18 PROCEDURE — 2580000003 HC RX 258: Performed by: EMERGENCY MEDICINE

## 2024-05-18 PROCEDURE — 36415 COLL VENOUS BLD VENIPUNCTURE: CPT

## 2024-05-18 PROCEDURE — 86304 IMMUNOASSAY TUMOR CA 125: CPT

## 2024-05-18 PROCEDURE — 6370000000 HC RX 637 (ALT 250 FOR IP)

## 2024-05-18 PROCEDURE — 84703 CHORIONIC GONADOTROPIN ASSAY: CPT

## 2024-05-18 PROCEDURE — 83605 ASSAY OF LACTIC ACID: CPT

## 2024-05-18 PROCEDURE — 2580000003 HC RX 258

## 2024-05-18 PROCEDURE — 87205 SMEAR GRAM STAIN: CPT

## 2024-05-18 PROCEDURE — 6360000002 HC RX W HCPCS: Performed by: EMERGENCY MEDICINE

## 2024-05-18 PROCEDURE — 80053 COMPREHEN METABOLIC PANEL: CPT

## 2024-05-18 PROCEDURE — 6360000002 HC RX W HCPCS

## 2024-05-18 PROCEDURE — 85025 COMPLETE CBC W/AUTO DIFF WBC: CPT

## 2024-05-18 PROCEDURE — 1200000000 HC SEMI PRIVATE

## 2024-05-18 PROCEDURE — 76830 TRANSVAGINAL US NON-OB: CPT

## 2024-05-18 PROCEDURE — A9579 GAD-BASE MR CONTRAST NOS,1ML: HCPCS

## 2024-05-18 PROCEDURE — 87086 URINE CULTURE/COLONY COUNT: CPT

## 2024-05-18 PROCEDURE — 83550 IRON BINDING TEST: CPT

## 2024-05-18 PROCEDURE — 87154 CUL TYP ID BLD PTHGN 6+ TRGT: CPT

## 2024-05-18 PROCEDURE — A4216 STERILE WATER/SALINE, 10 ML: HCPCS

## 2024-05-18 PROCEDURE — 82746 ASSAY OF FOLIC ACID SERUM: CPT

## 2024-05-18 PROCEDURE — 83540 ASSAY OF IRON: CPT

## 2024-05-18 PROCEDURE — 6370000000 HC RX 637 (ALT 250 FOR IP): Performed by: EMERGENCY MEDICINE

## 2024-05-18 PROCEDURE — 82728 ASSAY OF FERRITIN: CPT

## 2024-05-18 PROCEDURE — 87040 BLOOD CULTURE FOR BACTERIA: CPT

## 2024-05-18 PROCEDURE — 96372 THER/PROPH/DIAG INJ SC/IM: CPT

## 2024-05-18 PROCEDURE — 6360000004 HC RX CONTRAST MEDICATION

## 2024-05-18 PROCEDURE — 72197 MRI PELVIS W/O & W/DYE: CPT

## 2024-05-18 RX ORDER — ONDANSETRON 2 MG/ML
4 INJECTION INTRAMUSCULAR; INTRAVENOUS EVERY 6 HOURS PRN
Status: DISCONTINUED | OUTPATIENT
Start: 2024-05-18 | End: 2024-05-23

## 2024-05-18 RX ORDER — DOXYCYCLINE HYCLATE 100 MG
100 TABLET ORAL EVERY 12 HOURS SCHEDULED
Status: DISCONTINUED | OUTPATIENT
Start: 2024-05-18 | End: 2024-05-18

## 2024-05-18 RX ORDER — SODIUM CHLORIDE 0.9 % (FLUSH) 0.9 %
5-40 SYRINGE (ML) INJECTION PRN
Status: DISCONTINUED | OUTPATIENT
Start: 2024-05-18 | End: 2024-05-23 | Stop reason: HOSPADM

## 2024-05-18 RX ORDER — 0.9 % SODIUM CHLORIDE 0.9 %
30 INTRAVENOUS SOLUTION INTRAVENOUS ONCE
Status: DISCONTINUED | OUTPATIENT
Start: 2024-05-18 | End: 2024-05-19

## 2024-05-18 RX ORDER — OXYCODONE HYDROCHLORIDE 5 MG/1
5 TABLET ORAL PRN
Status: DISCONTINUED | OUTPATIENT
Start: 2024-05-18 | End: 2024-05-18

## 2024-05-18 RX ORDER — POTASSIUM CHLORIDE 7.45 MG/ML
10 INJECTION INTRAVENOUS DAILY
Status: DISCONTINUED | OUTPATIENT
Start: 2024-05-18 | End: 2024-05-23 | Stop reason: HOSPADM

## 2024-05-18 RX ORDER — SENNA AND DOCUSATE SODIUM 50; 8.6 MG/1; MG/1
2 TABLET, FILM COATED ORAL 2 TIMES DAILY
Status: DISCONTINUED | OUTPATIENT
Start: 2024-05-18 | End: 2024-05-20

## 2024-05-18 RX ORDER — METRONIDAZOLE 500 MG/100ML
500 INJECTION, SOLUTION INTRAVENOUS EVERY 12 HOURS
Status: DISCONTINUED | OUTPATIENT
Start: 2024-05-18 | End: 2024-05-18

## 2024-05-18 RX ORDER — CLINDAMYCIN PHOSPHATE 900 MG/50ML
900 INJECTION, SOLUTION INTRAVENOUS EVERY 8 HOURS
Status: DISCONTINUED | OUTPATIENT
Start: 2024-05-18 | End: 2024-05-18

## 2024-05-18 RX ORDER — SODIUM CHLORIDE, SODIUM LACTATE, POTASSIUM CHLORIDE, CALCIUM CHLORIDE 600; 310; 30; 20 MG/100ML; MG/100ML; MG/100ML; MG/100ML
INJECTION, SOLUTION INTRAVENOUS CONTINUOUS
Status: DISCONTINUED | OUTPATIENT
Start: 2024-05-18 | End: 2024-05-19

## 2024-05-18 RX ORDER — OXYCODONE HYDROCHLORIDE 5 MG/1
5 TABLET ORAL EVERY 8 HOURS PRN
Status: DISCONTINUED | OUTPATIENT
Start: 2024-05-18 | End: 2024-05-18

## 2024-05-18 RX ORDER — SODIUM CHLORIDE, SODIUM LACTATE, POTASSIUM CHLORIDE, AND CALCIUM CHLORIDE .6; .31; .03; .02 G/100ML; G/100ML; G/100ML; G/100ML
30 INJECTION, SOLUTION INTRAVENOUS ONCE
Status: COMPLETED | OUTPATIENT
Start: 2024-05-18 | End: 2024-05-18

## 2024-05-18 RX ORDER — GABAPENTIN 300 MG/1
300 CAPSULE ORAL 3 TIMES DAILY PRN
Status: DISCONTINUED | OUTPATIENT
Start: 2024-05-18 | End: 2024-05-22

## 2024-05-18 RX ORDER — SODIUM CHLORIDE 9 MG/ML
10 INJECTION, SOLUTION INTRAMUSCULAR; INTRAVENOUS; SUBCUTANEOUS ONCE
Status: COMPLETED | OUTPATIENT
Start: 2024-05-18 | End: 2024-05-18

## 2024-05-18 RX ORDER — POTASSIUM CHLORIDE 20 MEQ/1
40 TABLET, EXTENDED RELEASE ORAL DAILY
Status: DISCONTINUED | OUTPATIENT
Start: 2024-05-18 | End: 2024-05-23 | Stop reason: HOSPADM

## 2024-05-18 RX ORDER — LORAZEPAM 2 MG/ML
0.5 INJECTION INTRAMUSCULAR ONCE
Status: COMPLETED | OUTPATIENT
Start: 2024-05-18 | End: 2024-05-18

## 2024-05-18 RX ORDER — OXYCODONE HYDROCHLORIDE 5 MG/1
5 TABLET ORAL EVERY 6 HOURS PRN
Status: DISCONTINUED | OUTPATIENT
Start: 2024-05-18 | End: 2024-05-19

## 2024-05-18 RX ORDER — SODIUM CHLORIDE 9 MG/ML
INJECTION, SOLUTION INTRAVENOUS PRN
Status: DISCONTINUED | OUTPATIENT
Start: 2024-05-18 | End: 2024-05-23 | Stop reason: HOSPADM

## 2024-05-18 RX ORDER — CYCLOBENZAPRINE HCL 10 MG
10 TABLET ORAL 3 TIMES DAILY PRN
Status: DISCONTINUED | OUTPATIENT
Start: 2024-05-18 | End: 2024-05-23 | Stop reason: HOSPADM

## 2024-05-18 RX ORDER — ACETAMINOPHEN 500 MG
1000 TABLET ORAL ONCE
Status: COMPLETED | OUTPATIENT
Start: 2024-05-18 | End: 2024-05-18

## 2024-05-18 RX ORDER — ONDANSETRON 4 MG/1
4 TABLET, ORALLY DISINTEGRATING ORAL EVERY 8 HOURS PRN
Status: DISCONTINUED | OUTPATIENT
Start: 2024-05-18 | End: 2024-05-23

## 2024-05-18 RX ORDER — SODIUM CHLORIDE 0.9 % (FLUSH) 0.9 %
5-40 SYRINGE (ML) INJECTION EVERY 12 HOURS SCHEDULED
Status: DISCONTINUED | OUTPATIENT
Start: 2024-05-18 | End: 2024-05-23 | Stop reason: HOSPADM

## 2024-05-18 RX ORDER — ACETAMINOPHEN 500 MG
1000 TABLET ORAL EVERY 8 HOURS PRN
Status: DISCONTINUED | OUTPATIENT
Start: 2024-05-18 | End: 2024-05-19

## 2024-05-18 RX ORDER — 0.9 % SODIUM CHLORIDE 0.9 %
1000 INTRAVENOUS SOLUTION INTRAVENOUS ONCE
Status: DISCONTINUED | OUTPATIENT
Start: 2024-05-18 | End: 2024-05-18 | Stop reason: HOSPADM

## 2024-05-18 RX ADMIN — CYCLOBENZAPRINE 10 MG: 10 TABLET, FILM COATED ORAL at 06:05

## 2024-05-18 RX ADMIN — CEFTRIAXONE 1000 MG: 1 INJECTION, POWDER, FOR SOLUTION INTRAMUSCULAR; INTRAVENOUS at 00:44

## 2024-05-18 RX ADMIN — GABAPENTIN 300 MG: 300 CAPSULE ORAL at 13:26

## 2024-05-18 RX ADMIN — PIPERACILLIN AND TAZOBACTAM 3375 MG: 3; .375 INJECTION, POWDER, LYOPHILIZED, FOR SOLUTION INTRAVENOUS at 19:58

## 2024-05-18 RX ADMIN — POTASSIUM CHLORIDE 40 MEQ: 1500 TABLET, EXTENDED RELEASE ORAL at 08:17

## 2024-05-18 RX ADMIN — ACETAMINOPHEN 1000 MG: 500 TABLET ORAL at 14:23

## 2024-05-18 RX ADMIN — GABAPENTIN 300 MG: 300 CAPSULE ORAL at 06:05

## 2024-05-18 RX ADMIN — DOXYCYCLINE HYCLATE 100 MG: 100 TABLET, COATED ORAL at 13:20

## 2024-05-18 RX ADMIN — METRONIDAZOLE 500 MG: 500 SOLUTION INTRAVENOUS at 10:03

## 2024-05-18 RX ADMIN — SODIUM CHLORIDE 10 ML: 9 INJECTION INTRAMUSCULAR; INTRAVENOUS; SUBCUTANEOUS at 12:48

## 2024-05-18 RX ADMIN — ACETAMINOPHEN 1000 MG: 500 TABLET ORAL at 22:34

## 2024-05-18 RX ADMIN — LORAZEPAM 0.25 MG: 2 INJECTION INTRAMUSCULAR; INTRAVENOUS at 11:36

## 2024-05-18 RX ADMIN — CYCLOBENZAPRINE 10 MG: 10 TABLET, FILM COATED ORAL at 13:26

## 2024-05-18 RX ADMIN — GABAPENTIN 300 MG: 300 CAPSULE ORAL at 22:34

## 2024-05-18 RX ADMIN — ACETAMINOPHEN 1000 MG: 500 TABLET ORAL at 00:39

## 2024-05-18 RX ADMIN — CYCLOBENZAPRINE 10 MG: 10 TABLET, FILM COATED ORAL at 22:34

## 2024-05-18 RX ADMIN — SODIUM CHLORIDE, POTASSIUM CHLORIDE, SODIUM LACTATE AND CALCIUM CHLORIDE: 600; 310; 30; 20 INJECTION, SOLUTION INTRAVENOUS at 05:03

## 2024-05-18 RX ADMIN — SODIUM CHLORIDE, POTASSIUM CHLORIDE, SODIUM LACTATE AND CALCIUM CHLORIDE 3687 ML: 600; 310; 30; 20 INJECTION, SOLUTION INTRAVENOUS at 16:09

## 2024-05-18 RX ADMIN — CEFEPIME 2000 MG: 2 INJECTION, POWDER, FOR SOLUTION INTRAVENOUS at 13:31

## 2024-05-18 RX ADMIN — GADOTERIDOL 20 ML: 279.3 INJECTION, SOLUTION INTRAVENOUS at 12:40

## 2024-05-18 ASSESSMENT — ENCOUNTER SYMPTOMS
NAUSEA: 0
VOMITING: 0
SHORTNESS OF BREATH: 0
COUGH: 0

## 2024-05-18 ASSESSMENT — PAIN DESCRIPTION - ORIENTATION
ORIENTATION: RIGHT;LOWER
ORIENTATION: ANTERIOR;POSTERIOR
ORIENTATION: RIGHT;LEFT;MID

## 2024-05-18 ASSESSMENT — PAIN - FUNCTIONAL ASSESSMENT
PAIN_FUNCTIONAL_ASSESSMENT: PREVENTS OR INTERFERES SOME ACTIVE ACTIVITIES AND ADLS
PAIN_FUNCTIONAL_ASSESSMENT: ACTIVITIES ARE NOT PREVENTED
PAIN_FUNCTIONAL_ASSESSMENT: ACTIVITIES ARE NOT PREVENTED

## 2024-05-18 ASSESSMENT — PAIN SCALES - GENERAL
PAINLEVEL_OUTOF10: 8
PAINLEVEL_OUTOF10: 8
PAINLEVEL_OUTOF10: 6

## 2024-05-18 ASSESSMENT — PAIN DESCRIPTION - LOCATION
LOCATION: ABDOMEN;BACK
LOCATION: ABDOMEN;FLANK

## 2024-05-18 ASSESSMENT — PAIN DESCRIPTION - DESCRIPTORS
DESCRIPTORS: SHARP
DESCRIPTORS: ACHING
DESCRIPTORS: CRAMPING

## 2024-05-18 NOTE — ED PROVIDER NOTES
[facility]  [address] [facility] [address]  Phone: [phone]  eMERGENCY dEPARTMENT eNCOUnter      Pt Name: Evangelina King  MRN: [id number]  Birthdate 1980  Date of evaluation: 5/18/24      CHIEF COMPLAINT     Chief Complaint   Patient presents with    Abdominal Pain       HISTORY OF PRESENT ILLNESS    Evangelina King is a 44 y.o. female who presents today after being referred from urgent care.  Case was discussed with Ciarra Ayala CNP in urgent care before patient's arrival to the emergency department.  Patient states that approximately a year to a year and a half ago she started having heavy menstrual cycles.  She states that she will bleed through pads and tampons although her periods were fairly regular and she chalked this up to possibly being perimenopausal.  She did recently over the last 6 months or so started to have in addition to her regular menstrual flow very dark malodorous discharge.  Also over the past 2 and she had difficulty with emptying her bladder.  She is a G2, P2.  She states that during her last pregnancy which is approximately 14 years ago she was diagnosed with cervical cancer.  After she had her vaginal delivery she had a procedure done (possibly a LEEP.)  She has not had recent GYN follow-up.  She presented with bilateral low back pain to urgent care today.  She also notes that she has had increased urination.  She presented to urgent care because she was concerned about a kidney stone.  Urinalysis did show blood within the urine without clear indication for infection.  CT scan did have abnormal results including thickened endometrium with gas within the uterine cavity and what appears to be constriction of the cervix as well as bilateral hydronephrosis.  No obstructing kidney stone was noted.  Patient was transferred to the emergency department for further evaluation.  Patient had received Toradol in urgent care.  Patient indicates that initially improved her  pain but she is having worsening pain now.    REVIEW OF SYSTEMS       Review of Systems   Constitutional:  Negative for chills and fever.   HENT:  Negative for congestion.    Respiratory:  Negative for cough and shortness of breath.    Cardiovascular:  Negative for chest pain.   Gastrointestinal:  Negative for nausea and vomiting.   Genitourinary:  Positive for difficulty urinating, flank pain, vaginal bleeding and vaginal discharge.   Skin:  Negative for rash.   Neurological:  Negative for syncope.   All other systems reviewed and are negative.       PAST MEDICAL HISTORY    has a past medical history of Cancer (HCC).    SURGICAL HISTORY      has a past surgical history that includes Cholecystectomy.    CURRENT MEDICATIONS     There are no discharge medications for this patient.      ALLERGIES     has No Known Allergies.    FAMILY HISTORY     has no family status information on file.      family history is not on file.    SOCIAL HISTORY      reports that she has been smoking cigarettes. She has never used smokeless tobacco. She reports that she does not currently use alcohol.    PHYSICAL EXAM     INITIAL VITALS:  height is 1.702 m (5' 7\") and weight is 122.9 kg (271 lb). Her tympanic temperature is 101.8 °F (38.8 °C) (abnormal). Her blood pressure is 149/61 (abnormal) and her pulse is 122 (abnormal). Her respiration is 20 and oxygen saturation is 95%.     Physical Exam  Vitals reviewed.   Constitutional:       General: She is not in acute distress.     Appearance: She is not ill-appearing or diaphoretic.   HENT:      Head: Normocephalic and atraumatic.   Eyes:      Extraocular Movements: Extraocular movements intact.      Pupils: Pupils are equal, round, and reactive to light.   Cardiovascular:      Rate and Rhythm: Normal rate and regular rhythm.      Heart sounds: No murmur heard.  Pulmonary:      Effort: Pulmonary effort is normal. No respiratory distress.      Breath sounds: Normal breath sounds.   Abdominal:

## 2024-05-18 NOTE — DISCHARGE SUMMARY
days.     hydrOXYzine HCl 25 MG tablet  Commonly known as: ATARAX  Take 1 tablet by mouth every 8 hours as needed for Itching     lidocaine 4 % external patch  Place 1 patch onto the skin daily     senna 8.6 MG Tabs tablet  Commonly known as: SENOKOT  Take 2 tablets by mouth 2 times daily     sertraline 25 MG tablet  Commonly known as: Zoloft  Take 1 tablet by mouth daily     traMADol 50 MG tablet  Commonly known as: ULTRAM  Take 2 tablets by mouth every 6 hours as needed for Pain for up to 7 days. Max Daily Amount: 400 mg               Where to Get Your Medications        These medications were sent to [facility], [place] - [address] [facility] [facility] -  [phone] - F [phone]  [facility] [facility] [address]      Phone: [phone]   acetaminophen 500 MG tablet  cyclobenzaprine 10 MG tablet  gabapentin 300 MG capsule  hydrOXYzine HCl 25 MG tablet  lidocaine 4 % external patch  senna 8.6 MG Tabs tablet  sertraline 25 MG tablet  traMADol 50 MG tablet       You can get these medications from any pharmacy    Bring a paper prescription for each of these medications  ertapenem  infusion           Activity: pelvic rest, no driving on narcotics  Diet: regular diet  Follow up: 1 weeks     Condition on discharge: good and stable   Discharge Date: 05/23/24    Comments:  Home care, Follow-up care, restrictions reviewed.    Mercy Villatoro DO  Ob/Gyn Resident  [facility]  5/23/2024, 3:56 PM

## 2024-05-18 NOTE — FLOWSHEET NOTE
.44 year old female admitted as a direct admit from [facility]..  Oriented to surroundings.  Telemetry applied.  Patient  Don at bedside.

## 2024-05-19 LAB
ALBUMIN SERPL-MCNC: 3.3 G/DL (ref 3.5–5.2)
ALBUMIN/GLOB SERPL: 1 {RATIO} (ref 1–2.5)
ALP SERPL-CCNC: 138 U/L (ref 35–104)
ALT SERPL-CCNC: 13 U/L (ref 10–35)
ANION GAP SERPL CALCULATED.3IONS-SCNC: 11 MMOL/L (ref 9–16)
AST SERPL-CCNC: 47 U/L (ref 10–35)
BASOPHILS # BLD: 0.1 K/UL (ref 0–0.2)
BASOPHILS NFR BLD: 1 % (ref 0–2)
BILIRUB SERPL-MCNC: 0.2 MG/DL (ref 0–1.2)
BUN SERPL-MCNC: 14 MG/DL (ref 6–20)
CALCIUM SERPL-MCNC: 8.5 MG/DL (ref 8.6–10.4)
CHLORIDE SERPL-SCNC: 106 MMOL/L (ref 98–107)
CO2 SERPL-SCNC: 20 MMOL/L (ref 20–31)
CREAT SERPL-MCNC: 1.7 MG/DL (ref 0.5–0.9)
EKG ATRIAL RATE: 128 BPM
EKG P AXIS: 39 DEGREES
EKG P-R INTERVAL: 90 MS
EKG Q-T INTERVAL: 300 MS
EKG QRS DURATION: 78 MS
EKG QTC CALCULATION (BAZETT): 438 MS
EKG R AXIS: 47 DEGREES
EKG T AXIS: 28 DEGREES
EKG VENTRICULAR RATE: 128 BPM
EOSINOPHIL # BLD: 0.1 K/UL (ref 0–0.44)
EOSINOPHILS RELATIVE PERCENT: 1 % (ref 1–4)
ERYTHROCYTE [DISTWIDTH] IN BLOOD BY AUTOMATED COUNT: 19 % (ref 11.8–14.4)
GFR, ESTIMATED: 38 ML/MIN/1.73M2
GLUCOSE SERPL-MCNC: 106 MG/DL (ref 74–99)
HCT VFR BLD AUTO: 27.3 % (ref 36.3–47.1)
HGB BLD-MCNC: 7.7 G/DL (ref 11.9–15.1)
IMM GRANULOCYTES # BLD AUTO: 0 K/UL (ref 0–0.3)
IMM GRANULOCYTES NFR BLD: 0 %
LYMPHOCYTES NFR BLD: 1.16 K/UL (ref 1.1–3.7)
LYMPHOCYTES RELATIVE PERCENT: 12 % (ref 24–43)
MCH RBC QN AUTO: 19.4 PG (ref 25.2–33.5)
MCHC RBC AUTO-ENTMCNC: 28.2 G/DL (ref 28.4–34.8)
MCV RBC AUTO: 68.9 FL (ref 82.6–102.9)
MICROORGANISM SPEC CULT: ABNORMAL
MICROORGANISM SPEC CULT: NO GROWTH
MONOCYTES NFR BLD: 0.87 K/UL (ref 0.1–1.2)
MONOCYTES NFR BLD: 9 % (ref 3–12)
MORPHOLOGY: ABNORMAL
NEUTROPHILS NFR BLD: 77 % (ref 36–65)
NEUTS SEG NFR BLD: 7.47 K/UL (ref 1.5–8.1)
NRBC BLD-RTO: 0 PER 100 WBC
PLATELET # BLD AUTO: 250 K/UL (ref 138–453)
PMV BLD AUTO: 8.9 FL (ref 8.1–13.5)
POTASSIUM SERPL-SCNC: 3.7 MMOL/L (ref 3.7–5.3)
PROT SERPL-MCNC: 6.1 G/DL (ref 6.6–8.7)
RBC # BLD AUTO: 3.96 M/UL (ref 3.95–5.11)
SERVICE CMNT-IMP: ABNORMAL
SODIUM SERPL-SCNC: 137 MMOL/L (ref 136–145)
SPECIMEN DESCRIPTION: ABNORMAL
SPECIMEN DESCRIPTION: NORMAL
WBC OTHER # BLD: 9.7 K/UL (ref 3.5–11.3)

## 2024-05-19 PROCEDURE — 6360000002 HC RX W HCPCS

## 2024-05-19 PROCEDURE — 1200000000 HC SEMI PRIVATE

## 2024-05-19 PROCEDURE — 6370000000 HC RX 637 (ALT 250 FOR IP)

## 2024-05-19 PROCEDURE — 85025 COMPLETE CBC W/AUTO DIFF WBC: CPT

## 2024-05-19 PROCEDURE — 80053 COMPREHEN METABOLIC PANEL: CPT

## 2024-05-19 PROCEDURE — 2580000003 HC RX 258

## 2024-05-19 PROCEDURE — 99232 SBSQ HOSP IP/OBS MODERATE 35: CPT | Performed by: INTERNAL MEDICINE

## 2024-05-19 PROCEDURE — 6370000000 HC RX 637 (ALT 250 FOR IP): Performed by: OBSTETRICS & GYNECOLOGY

## 2024-05-19 PROCEDURE — 36415 COLL VENOUS BLD VENIPUNCTURE: CPT

## 2024-05-19 RX ORDER — SODIUM CHLORIDE 9 MG/ML
INJECTION, SOLUTION INTRAVENOUS CONTINUOUS
Status: DISCONTINUED | OUTPATIENT
Start: 2024-05-19 | End: 2024-05-23 | Stop reason: HOSPADM

## 2024-05-19 RX ORDER — TRAMADOL HYDROCHLORIDE 50 MG/1
50 TABLET ORAL EVERY 6 HOURS PRN
Status: DISCONTINUED | OUTPATIENT
Start: 2024-05-19 | End: 2024-05-22

## 2024-05-19 RX ORDER — ACETAMINOPHEN 500 MG
1000 TABLET ORAL EVERY 6 HOURS PRN
Status: DISCONTINUED | OUTPATIENT
Start: 2024-05-19 | End: 2024-05-22

## 2024-05-19 RX ORDER — OXYCODONE HYDROCHLORIDE 5 MG/1
5 TABLET ORAL EVERY 4 HOURS PRN
Status: DISCONTINUED | OUTPATIENT
Start: 2024-05-19 | End: 2024-05-22

## 2024-05-19 RX ADMIN — TRAMADOL HYDROCHLORIDE 50 MG: 50 TABLET ORAL at 23:46

## 2024-05-19 RX ADMIN — SODIUM CHLORIDE: 9 INJECTION, SOLUTION INTRAVENOUS at 23:51

## 2024-05-19 RX ADMIN — DOCUSATE SODIUM 50 MG AND SENNOSIDES 8.6 MG 2 TABLET: 8.6; 5 TABLET, FILM COATED ORAL at 10:51

## 2024-05-19 RX ADMIN — ACETAMINOPHEN 1000 MG: 500 TABLET ORAL at 23:46

## 2024-05-19 RX ADMIN — ONDANSETRON 4 MG: 2 INJECTION INTRAMUSCULAR; INTRAVENOUS at 13:49

## 2024-05-19 RX ADMIN — ONDANSETRON 4 MG: 2 INJECTION INTRAMUSCULAR; INTRAVENOUS at 23:46

## 2024-05-19 RX ADMIN — SODIUM CHLORIDE: 9 INJECTION, SOLUTION INTRAVENOUS at 04:18

## 2024-05-19 RX ADMIN — ACETAMINOPHEN 1000 MG: 500 TABLET ORAL at 06:46

## 2024-05-19 RX ADMIN — DOCUSATE SODIUM 50 MG AND SENNOSIDES 8.6 MG 2 TABLET: 8.6; 5 TABLET, FILM COATED ORAL at 23:56

## 2024-05-19 RX ADMIN — OXYCODONE 5 MG: 5 TABLET ORAL at 13:48

## 2024-05-19 RX ADMIN — CYCLOBENZAPRINE 10 MG: 10 TABLET, FILM COATED ORAL at 16:49

## 2024-05-19 RX ADMIN — TRAMADOL HYDROCHLORIDE 50 MG: 50 TABLET ORAL at 10:51

## 2024-05-19 RX ADMIN — ACETAMINOPHEN 1000 MG: 500 TABLET ORAL at 13:48

## 2024-05-19 RX ADMIN — CYCLOBENZAPRINE 10 MG: 10 TABLET, FILM COATED ORAL at 23:46

## 2024-05-19 RX ADMIN — CYCLOBENZAPRINE 10 MG: 10 TABLET, FILM COATED ORAL at 06:46

## 2024-05-19 RX ADMIN — PIPERACILLIN AND TAZOBACTAM 3375 MG: 3; .375 INJECTION, POWDER, LYOPHILIZED, FOR SOLUTION INTRAVENOUS at 04:11

## 2024-05-19 RX ADMIN — GABAPENTIN 300 MG: 300 CAPSULE ORAL at 16:50

## 2024-05-19 RX ADMIN — PIPERACILLIN AND TAZOBACTAM 3375 MG: 3; .375 INJECTION, POWDER, LYOPHILIZED, FOR SOLUTION INTRAVENOUS at 16:53

## 2024-05-19 RX ADMIN — GABAPENTIN 300 MG: 300 CAPSULE ORAL at 23:46

## 2024-05-19 RX ADMIN — TRAMADOL HYDROCHLORIDE 50 MG: 50 TABLET ORAL at 16:49

## 2024-05-19 RX ADMIN — OXYCODONE 5 MG: 5 TABLET ORAL at 23:56

## 2024-05-19 RX ADMIN — GABAPENTIN 300 MG: 300 CAPSULE ORAL at 06:46

## 2024-05-19 ASSESSMENT — PAIN SCALES - GENERAL
PAINLEVEL_OUTOF10: 6
PAINLEVEL_OUTOF10: 0
PAINLEVEL_OUTOF10: 0
PAINLEVEL_OUTOF10: 7
PAINLEVEL_OUTOF10: 8
PAINLEVEL_OUTOF10: 6
PAINLEVEL_OUTOF10: 8
PAINLEVEL_OUTOF10: 2
PAINLEVEL_OUTOF10: 9
PAINLEVEL_OUTOF10: 2

## 2024-05-19 ASSESSMENT — PAIN DESCRIPTION - LOCATION
LOCATION: ABDOMEN;BACK
LOCATION: ABDOMEN
LOCATION: ABDOMEN;PELVIS
LOCATION: ABDOMEN;BACK
LOCATION: ABDOMEN;PERINEUM

## 2024-05-19 ASSESSMENT — PAIN SCALES - WONG BAKER
WONGBAKER_NUMERICALRESPONSE: HURTS A LITTLE BIT
WONGBAKER_NUMERICALRESPONSE: NO HURT
WONGBAKER_NUMERICALRESPONSE: HURTS A LITTLE BIT
WONGBAKER_NUMERICALRESPONSE: NO HURT
WONGBAKER_NUMERICALRESPONSE: HURTS A LITTLE BIT

## 2024-05-19 ASSESSMENT — PAIN DESCRIPTION - ORIENTATION
ORIENTATION: RIGHT;LEFT;LOWER
ORIENTATION: MID
ORIENTATION: LOWER
ORIENTATION: RIGHT;LOWER

## 2024-05-19 ASSESSMENT — PAIN - FUNCTIONAL ASSESSMENT
PAIN_FUNCTIONAL_ASSESSMENT: ACTIVITIES ARE NOT PREVENTED

## 2024-05-19 ASSESSMENT — PAIN DESCRIPTION - DESCRIPTORS
DESCRIPTORS: CRAMPING
DESCRIPTORS: ACHING;CRAMPING
DESCRIPTORS: ACHING;CRUSHING
DESCRIPTORS: CRAMPING
DESCRIPTORS: ACHING
DESCRIPTORS: ACHING

## 2024-05-19 NOTE — H&P
Attending Physician Statement  I have discussed the case of Evangelina King, including pertinent history and exam findings with the resident/fellow/medical student/NP/PA. I have seen and examined the patient and the key elements of the encounter have been performed by me.  I agree with the assessment, plan and orders as documented by the resident/fellow/medical student/NP/PA  With changes made to the note as needed.   Consultation requested by OB/GYN service for medical management  Pt was seen during rounds.  Review of Systems:   In addition to the pertinent positives and negatives as stated within HPI and the review of systems as documented in their notes, all other systems were reviewed when able to and are reported negative.    Patient admitted as a transfer from [place] for thickened endometrium, hydronephrosis and fever.  Patient with pelvic and abdominal pain with difficulty voiding and vaginal bleeding for the past 6 months.  The pain has been present for 6 to 6 weeks.  Patient found to have enlarged uterus with thickened endometrium and focal areas of gas in the upper segment.  Concern for malignancy versus endometritis.  Gynecology evaluated the patient and their concern for malignancy-continue gynecological workup.  Transfuse to keep hemoglobin more than 7    Bilateral hydronephrosis with fever.  Concern would be for sepsis due to UTI-continue antibiotics and obtain urology consultation    History of cervical cancer-OB/GYN following patient    Current smoker-recommend cessation    Mild hyponatremia-continue to monitor    Hypokalemia-supplement and continue to monitor    Mild acute kidney injury could be related to the hydronephrosis-continue to follow and urology has been consulted    Anemia, secondary to chronic blood loss with hypochromic and microcytic anemia-continue to monitor and transfuse to keep hemoglobin more than 7    Leukocytosis secondary to urinary infection-continue to monitor    EPC cuffs 
pain,  negative palpitations  Gastrointestinal: negative abdominal pain, negative RUQ pain, negative N/V, negative diarrhea, negative constipation  Genitourinary: negative dysuria, negative vaginal discharge, negative vaginal bleeding  Dermatological: negative rash  Hematologic: negative bruising  Immunologic/Lymphatic: negative recent illness, negative recent sick contact  Musculoskeletal: negative back pain, negative myalgias, negative arthralgias  Neurological:  negative dizziness, negative weakness  Behavior/Psych: negative depression, negative anxiety    GYNECOLOGICAL HISTORY:  Pap History: patient reports many years since last PAP (unsure as to when)    Permanent Sterilization: denies  Reversible Birth Control: denies  Hormone Replacement Exposure: denies  OBSTETRIC HISTORY:   OB History   No obstetric history on file.       PAST MEDICAL HISTORY:   has a past medical history of Cancer (HCC).    PAST SURGICAL HISTORY:   has a past surgical history that includes Cholecystectomy.    ALLERGIES:  Allergies as of 05/17/2024    (No Known Allergies)     MEDICATIONS:  Current Facility-Administered Medications   Medication Dose Route Frequency Provider Last Rate Last Admin    0.9 % sodium chloride infusion   IntraVENous Continuous Kathrin Figueroa  mL/hr at 05/19/24 0418 New Bag at 05/19/24 0418    acetaminophen (TYLENOL) tablet 1,000 mg  1,000 mg Oral Q6H PRN Kathrin Figueroa MD        ondansetron (ZOFRAN-ODT) disintegrating tablet 4 mg  4 mg Oral Q8H PRN Koki Ceron MD        Or    ondansetron (ZOFRAN) injection 4 mg  4 mg IntraVENous Q6H PRN Koki Ceron MD        gabapentin (NEURONTIN) capsule 300 mg  300 mg Oral TID PRN Koki Ceron MD   300 mg at 05/18/24 2234    cyclobenzaprine (FLEXERIL) tablet 10 mg  10 mg Oral TID PRN Koki Ceron MD   10 mg at 05/18/24 2234    potassium chloride (KLOR-CON M) extended release tablet 40 mEq  40 mEq Oral Daily Leti Dupree DO   40 mEq at 
process cannot be excluded.  Recommend gynecologic consultation with additional follow-up with either ultrasound or MRI. 2. Mild bilateral hydronephrosis and hydroureter extending to the level of the lower uterine segment/cervix.  No obstructing stone is identified.  Given above findings a neoplastic stricture/involvement cannot be excluded. 3. Mild perivesicular stranding some of which is likely related to underdistention.  Recommend correlation with urinalysis. 4. Abnormal predominately left-sided pelvic adenopathy as well as retroperitoneal adenopathy, highly suspicious for metastatic disease. Critical results were called by Dr. Lois Vinson MD to Nell Greer CNP on 5/17/2024 at 20:24.       ASSESSMENT & PLAN:    Evangelina King is a 44 y.o. female Thickened Endometrium with concern for malignancy    - Patient transferred from [place] for gyn evaluation for concern for malignancy with thickened endometrium with hydronephrosis   - Patient states that she has persistent vaginal bleeding that is at time bright red blood then transitions into malodorous rotting-smell black clots   - TVUS and MRI ordered   - CBC and CMP pending this AM   -  ml ordered   - Urology consulted   - Flexeril, tylenol and gabapentin for pain control   - General diet   - IVF @ 125 ml/hr   - Plan for bedside EMB today   - Concern for endometrial or other gynecological cancer. Will plan to gather more data today and speak with gyn onc attending Dr. Clayton.     Bilateral hydronephrosis   - Seen on CT at [place]   - Monitor urine output   - Urology consulted    Tachycardia with fever in [place]   - Currently afebrile   - Fluids running   - Rocephin ordered    Patient Active Problem List    Diagnosis Date Noted    Pelvic pain 05/18/2024       Plan discussed with Dr. Johnson, who is agreeable.     Attending's Name: Dr. Alex Ceron MD  Ob/Gyn Resident  [facility]  5/18/2024, 7:36 AM

## 2024-05-19 NOTE — PLAN OF CARE
Problem: Discharge Planning  Goal: Discharge to home or other facility with appropriate resources  Outcome: Progressing     Problem: Pain  Goal: Verbalizes/displays adequate comfort level or baseline comfort level  Outcome: Progressing  Flowsheets (Taken 5/18/2024 4313 by Susu Landon, RN)  Verbalizes/displays adequate comfort level or baseline comfort level: Encourage patient to monitor pain and request assistance

## 2024-05-20 ENCOUNTER — APPOINTMENT (OUTPATIENT)
Dept: GENERAL RADIOLOGY | Age: 44
DRG: 744 | End: 2024-05-20
Attending: OBSTETRICS & GYNECOLOGY
Payer: COMMERCIAL

## 2024-05-20 ENCOUNTER — ANESTHESIA (OUTPATIENT)
Dept: OPERATING ROOM | Age: 44
DRG: 744 | End: 2024-05-20
Payer: COMMERCIAL

## 2024-05-20 ENCOUNTER — ANESTHESIA EVENT (OUTPATIENT)
Dept: OPERATING ROOM | Age: 44
DRG: 744 | End: 2024-05-20
Payer: COMMERCIAL

## 2024-05-20 PROBLEM — Z98.890 POST-OPERATIVE STATE: Status: ACTIVE | Noted: 2024-05-20

## 2024-05-20 PROBLEM — C53.9 CERVICAL CANCER (HCC): Status: ACTIVE | Noted: 2024-05-20

## 2024-05-20 LAB
HCG SERPL QL: NEGATIVE
INR PPP: 1.2
MICROORGANISM SPEC CULT: ABNORMAL
PATH REV BLD -IMP: NORMAL
PROTHROMBIN TIME: 14.7 SEC (ref 11.7–14.9)
SERVICE CMNT-IMP: ABNORMAL
SPECIMEN DESCRIPTION: ABNORMAL
SURGICAL PATHOLOGY REPORT: NORMAL

## 2024-05-20 PROCEDURE — 99233 SBSQ HOSP IP/OBS HIGH 50: CPT | Performed by: INTERNAL MEDICINE

## 2024-05-20 PROCEDURE — 6370000000 HC RX 637 (ALT 250 FOR IP): Performed by: STUDENT IN AN ORGANIZED HEALTH CARE EDUCATION/TRAINING PROGRAM

## 2024-05-20 PROCEDURE — 87176 TISSUE HOMOGENIZATION CULTR: CPT

## 2024-05-20 PROCEDURE — 51702 INSERT TEMP BLADDER CATH: CPT

## 2024-05-20 PROCEDURE — 57500 BIOPSY OF CERVIX: CPT | Performed by: OBSTETRICS & GYNECOLOGY

## 2024-05-20 PROCEDURE — 0TBB8ZX EXCISION OF BLADDER, VIA NATURAL OR ARTIFICIAL OPENING ENDOSCOPIC, DIAGNOSTIC: ICD-10-PCS | Performed by: STUDENT IN AN ORGANIZED HEALTH CARE EDUCATION/TRAINING PROGRAM

## 2024-05-20 PROCEDURE — 88305 TISSUE EXAM BY PATHOLOGIST: CPT

## 2024-05-20 PROCEDURE — 0UDB7ZX EXTRACTION OF ENDOMETRIUM, VIA NATURAL OR ARTIFICIAL OPENING, DIAGNOSTIC: ICD-10-PCS | Performed by: OBSTETRICS & GYNECOLOGY

## 2024-05-20 PROCEDURE — 7100000000 HC PACU RECOVERY - FIRST 15 MIN: Performed by: OBSTETRICS & GYNECOLOGY

## 2024-05-20 PROCEDURE — 1200000000 HC SEMI PRIVATE

## 2024-05-20 PROCEDURE — 2580000003 HC RX 258: Performed by: UROLOGY

## 2024-05-20 PROCEDURE — 6360000002 HC RX W HCPCS: Performed by: STUDENT IN AN ORGANIZED HEALTH CARE EDUCATION/TRAINING PROGRAM

## 2024-05-20 PROCEDURE — 51798 US URINE CAPACITY MEASURE: CPT

## 2024-05-20 PROCEDURE — 85610 PROTHROMBIN TIME: CPT

## 2024-05-20 PROCEDURE — 0UBC7ZX EXCISION OF CERVIX, VIA NATURAL OR ARTIFICIAL OPENING, DIAGNOSTIC: ICD-10-PCS | Performed by: OBSTETRICS & GYNECOLOGY

## 2024-05-20 PROCEDURE — 3700000001 HC ADD 15 MINUTES (ANESTHESIA): Performed by: OBSTETRICS & GYNECOLOGY

## 2024-05-20 PROCEDURE — 2580000003 HC RX 258: Performed by: OBSTETRICS & GYNECOLOGY

## 2024-05-20 PROCEDURE — 2720000010 HC SURG SUPPLY STERILE: Performed by: OBSTETRICS & GYNECOLOGY

## 2024-05-20 PROCEDURE — 88341 IMHCHEM/IMCYTCHM EA ADD ANTB: CPT

## 2024-05-20 PROCEDURE — 2500000003 HC RX 250 WO HCPCS

## 2024-05-20 PROCEDURE — 87205 SMEAR GRAM STAIN: CPT

## 2024-05-20 PROCEDURE — 3700000000 HC ANESTHESIA ATTENDED CARE: Performed by: OBSTETRICS & GYNECOLOGY

## 2024-05-20 PROCEDURE — 6360000002 HC RX W HCPCS

## 2024-05-20 PROCEDURE — 87070 CULTURE OTHR SPECIMN AEROBIC: CPT

## 2024-05-20 PROCEDURE — 2580000003 HC RX 258

## 2024-05-20 PROCEDURE — C1758 CATHETER, URETERAL: HCPCS | Performed by: OBSTETRICS & GYNECOLOGY

## 2024-05-20 PROCEDURE — 6370000000 HC RX 637 (ALT 250 FOR IP): Performed by: OBSTETRICS & GYNECOLOGY

## 2024-05-20 PROCEDURE — 2709999900 HC NON-CHARGEABLE SUPPLY: Performed by: OBSTETRICS & GYNECOLOGY

## 2024-05-20 PROCEDURE — 7100000001 HC PACU RECOVERY - ADDTL 15 MIN: Performed by: OBSTETRICS & GYNECOLOGY

## 2024-05-20 PROCEDURE — 88342 IMHCHEM/IMCYTCHM 1ST ANTB: CPT

## 2024-05-20 PROCEDURE — 87075 CULTR BACTERIA EXCEPT BLOOD: CPT

## 2024-05-20 PROCEDURE — 2580000003 HC RX 258: Performed by: STUDENT IN AN ORGANIZED HEALTH CARE EDUCATION/TRAINING PROGRAM

## 2024-05-20 PROCEDURE — 58558 HYSTEROSCOPY BIOPSY: CPT | Performed by: OBSTETRICS & GYNECOLOGY

## 2024-05-20 PROCEDURE — 3600000004 HC SURGERY LEVEL 4 BASE: Performed by: OBSTETRICS & GYNECOLOGY

## 2024-05-20 PROCEDURE — 3600000014 HC SURGERY LEVEL 4 ADDTL 15MIN: Performed by: OBSTETRICS & GYNECOLOGY

## 2024-05-20 RX ORDER — NALOXONE HYDROCHLORIDE 0.4 MG/ML
INJECTION, SOLUTION INTRAMUSCULAR; INTRAVENOUS; SUBCUTANEOUS PRN
Status: DISCONTINUED | OUTPATIENT
Start: 2024-05-20 | End: 2024-05-20 | Stop reason: HOSPADM

## 2024-05-20 RX ORDER — SENNOSIDES 8.6 MG
2 TABLET ORAL 2 TIMES DAILY
Qty: 120 TABLET | Refills: 1 | Status: SHIPPED | OUTPATIENT
Start: 2024-05-20

## 2024-05-20 RX ORDER — PROCHLORPERAZINE EDISYLATE 5 MG/ML
10 INJECTION INTRAMUSCULAR; INTRAVENOUS EVERY 6 HOURS PRN
Status: DISCONTINUED | OUTPATIENT
Start: 2024-05-20 | End: 2024-05-23 | Stop reason: HOSPADM

## 2024-05-20 RX ORDER — CYCLOBENZAPRINE HCL 10 MG
10 TABLET ORAL 3 TIMES DAILY PRN
Qty: 90 TABLET | Refills: 0 | Status: SHIPPED | OUTPATIENT
Start: 2024-05-20 | End: 2024-06-19

## 2024-05-20 RX ORDER — HYDRALAZINE HYDROCHLORIDE 20 MG/ML
10 INJECTION INTRAMUSCULAR; INTRAVENOUS
Status: DISCONTINUED | OUTPATIENT
Start: 2024-05-20 | End: 2024-05-20 | Stop reason: HOSPADM

## 2024-05-20 RX ORDER — SODIUM CHLORIDE 0.9 % (FLUSH) 0.9 %
5-40 SYRINGE (ML) INJECTION PRN
Status: DISCONTINUED | OUTPATIENT
Start: 2024-05-20 | End: 2024-05-20 | Stop reason: HOSPADM

## 2024-05-20 RX ORDER — SODIUM CHLORIDE 9 MG/ML
INJECTION, SOLUTION INTRAVENOUS PRN
Status: DISCONTINUED | OUTPATIENT
Start: 2024-05-20 | End: 2024-05-20 | Stop reason: HOSPADM

## 2024-05-20 RX ORDER — FENTANYL CITRATE 50 UG/ML
INJECTION, SOLUTION INTRAMUSCULAR; INTRAVENOUS PRN
Status: DISCONTINUED | OUTPATIENT
Start: 2024-05-20 | End: 2024-05-20 | Stop reason: SDUPTHER

## 2024-05-20 RX ORDER — ONDANSETRON 2 MG/ML
4 INJECTION INTRAMUSCULAR; INTRAVENOUS
Status: COMPLETED | OUTPATIENT
Start: 2024-05-20 | End: 2024-05-20

## 2024-05-20 RX ORDER — MIDAZOLAM HYDROCHLORIDE 1 MG/ML
INJECTION INTRAMUSCULAR; INTRAVENOUS PRN
Status: DISCONTINUED | OUTPATIENT
Start: 2024-05-20 | End: 2024-05-20 | Stop reason: SDUPTHER

## 2024-05-20 RX ORDER — SENNOSIDES A AND B 8.6 MG/1
2 TABLET, FILM COATED ORAL 2 TIMES DAILY
Status: DISCONTINUED | OUTPATIENT
Start: 2024-05-20 | End: 2024-05-23 | Stop reason: HOSPADM

## 2024-05-20 RX ORDER — CEFAZOLIN SODIUM 1 G/3ML
INJECTION, POWDER, FOR SOLUTION INTRAMUSCULAR; INTRAVENOUS PRN
Status: DISCONTINUED | OUTPATIENT
Start: 2024-05-20 | End: 2024-05-20 | Stop reason: SDUPTHER

## 2024-05-20 RX ORDER — LIDOCAINE HYDROCHLORIDE 10 MG/ML
INJECTION, SOLUTION EPIDURAL; INFILTRATION; INTRACAUDAL; PERINEURAL PRN
Status: DISCONTINUED | OUTPATIENT
Start: 2024-05-20 | End: 2024-05-20 | Stop reason: SDUPTHER

## 2024-05-20 RX ORDER — LORAZEPAM 0.5 MG/1
0.5 TABLET ORAL EVERY 4 HOURS PRN
Status: DISCONTINUED | OUTPATIENT
Start: 2024-05-20 | End: 2024-05-23 | Stop reason: HOSPADM

## 2024-05-20 RX ORDER — ONDANSETRON 2 MG/ML
INJECTION INTRAMUSCULAR; INTRAVENOUS PRN
Status: DISCONTINUED | OUTPATIENT
Start: 2024-05-20 | End: 2024-05-20 | Stop reason: SDUPTHER

## 2024-05-20 RX ORDER — FERRIC SUBSULFATE 0.21 G/G
LIQUID TOPICAL PRN
Status: DISCONTINUED | OUTPATIENT
Start: 2024-05-20 | End: 2024-05-20 | Stop reason: ALTCHOICE

## 2024-05-20 RX ORDER — MAGNESIUM HYDROXIDE 1200 MG/15ML
LIQUID ORAL CONTINUOUS PRN
Status: DISCONTINUED | OUTPATIENT
Start: 2024-05-20 | End: 2024-05-20 | Stop reason: HOSPADM

## 2024-05-20 RX ORDER — SODIUM CHLORIDE 0.9 % (FLUSH) 0.9 %
5-40 SYRINGE (ML) INJECTION EVERY 12 HOURS SCHEDULED
Status: DISCONTINUED | OUTPATIENT
Start: 2024-05-20 | End: 2024-05-20 | Stop reason: HOSPADM

## 2024-05-20 RX ORDER — DEXAMETHASONE SODIUM PHOSPHATE 10 MG/ML
INJECTION INTRAMUSCULAR; INTRAVENOUS PRN
Status: DISCONTINUED | OUTPATIENT
Start: 2024-05-20 | End: 2024-05-20 | Stop reason: SDUPTHER

## 2024-05-20 RX ORDER — GABAPENTIN 300 MG/1
300 CAPSULE ORAL 3 TIMES DAILY PRN
Qty: 90 CAPSULE | Refills: 1 | Status: SHIPPED | OUTPATIENT
Start: 2024-05-20 | End: 2024-07-19

## 2024-05-20 RX ORDER — PROPOFOL 10 MG/ML
INJECTION, EMULSION INTRAVENOUS PRN
Status: DISCONTINUED | OUTPATIENT
Start: 2024-05-20 | End: 2024-05-20 | Stop reason: SDUPTHER

## 2024-05-20 RX ORDER — ACETAMINOPHEN 500 MG
1000 TABLET ORAL EVERY 6 HOURS PRN
Qty: 60 TABLET | Refills: 0 | Status: SHIPPED | OUTPATIENT
Start: 2024-05-20 | End: 2024-06-19

## 2024-05-20 RX ORDER — SODIUM CHLORIDE, SODIUM LACTATE, POTASSIUM CHLORIDE, CALCIUM CHLORIDE 600; 310; 30; 20 MG/100ML; MG/100ML; MG/100ML; MG/100ML
INJECTION, SOLUTION INTRAVENOUS CONTINUOUS PRN
Status: DISCONTINUED | OUTPATIENT
Start: 2024-05-20 | End: 2024-05-20 | Stop reason: SDUPTHER

## 2024-05-20 RX ORDER — TRAMADOL HYDROCHLORIDE 50 MG/1
100 TABLET ORAL EVERY 6 HOURS PRN
Qty: 56 TABLET | Refills: 0 | Status: SHIPPED | OUTPATIENT
Start: 2024-05-20 | End: 2024-05-27

## 2024-05-20 RX ADMIN — MIDAZOLAM 2 MG: 1 INJECTION INTRAMUSCULAR; INTRAVENOUS at 07:36

## 2024-05-20 RX ADMIN — SODIUM CHLORIDE, POTASSIUM CHLORIDE, SODIUM LACTATE AND CALCIUM CHLORIDE: 600; 310; 30; 20 INJECTION, SOLUTION INTRAVENOUS at 08:23

## 2024-05-20 RX ADMIN — LORAZEPAM 0.5 MG: 0.5 TABLET ORAL at 21:32

## 2024-05-20 RX ADMIN — SODIUM CHLORIDE, POTASSIUM CHLORIDE, SODIUM LACTATE AND CALCIUM CHLORIDE: 600; 310; 30; 20 INJECTION, SOLUTION INTRAVENOUS at 07:35

## 2024-05-20 RX ADMIN — SUGAMMADEX 200 MG: 100 INJECTION, SOLUTION INTRAVENOUS at 08:43

## 2024-05-20 RX ADMIN — PROPOFOL 200 MG: 10 INJECTION, EMULSION INTRAVENOUS at 07:36

## 2024-05-20 RX ADMIN — OXYCODONE 5 MG: 5 TABLET ORAL at 14:26

## 2024-05-20 RX ADMIN — CYCLOBENZAPRINE 10 MG: 10 TABLET, FILM COATED ORAL at 23:13

## 2024-05-20 RX ADMIN — PIPERACILLIN AND TAZOBACTAM 3375 MG: 3; .375 INJECTION, POWDER, LYOPHILIZED, FOR SOLUTION INTRAVENOUS at 00:44

## 2024-05-20 RX ADMIN — ONDANSETRON 4 MG: 2 INJECTION INTRAMUSCULAR; INTRAVENOUS at 19:13

## 2024-05-20 RX ADMIN — PHENYLEPHRINE HYDROCHLORIDE 100 MCG: 10 INJECTION INTRAVENOUS at 08:19

## 2024-05-20 RX ADMIN — DOCUSATE SODIUM 50 MG AND SENNOSIDES 8.6 MG 2 TABLET: 8.6; 5 TABLET, FILM COATED ORAL at 19:53

## 2024-05-20 RX ADMIN — SENNOSIDES 17.2 MG: 8.6 TABLET, FILM COATED ORAL at 21:32

## 2024-05-20 RX ADMIN — GABAPENTIN 300 MG: 300 CAPSULE ORAL at 23:13

## 2024-05-20 RX ADMIN — LORAZEPAM 0.5 MG: 0.5 TABLET ORAL at 14:33

## 2024-05-20 RX ADMIN — SODIUM CHLORIDE, PRESERVATIVE FREE 10 ML: 5 INJECTION INTRAVENOUS at 19:53

## 2024-05-20 RX ADMIN — OXYCODONE 5 MG: 5 TABLET ORAL at 19:13

## 2024-05-20 RX ADMIN — LIDOCAINE HYDROCHLORIDE 50 MG: 10 INJECTION, SOLUTION EPIDURAL; INFILTRATION; INTRACAUDAL; PERINEURAL at 07:36

## 2024-05-20 RX ADMIN — FENTANYL CITRATE 100 MCG: 50 INJECTION, SOLUTION INTRAMUSCULAR; INTRAVENOUS at 07:36

## 2024-05-20 RX ADMIN — OXYCODONE 5 MG: 5 TABLET ORAL at 23:13

## 2024-05-20 RX ADMIN — ONDANSETRON 4 MG: 2 INJECTION INTRAMUSCULAR; INTRAVENOUS at 09:44

## 2024-05-20 RX ADMIN — ONDANSETRON 4 MG: 2 INJECTION INTRAMUSCULAR; INTRAVENOUS at 08:22

## 2024-05-20 RX ADMIN — DEXAMETHASONE SODIUM PHOSPHATE 10 MG: 10 INJECTION INTRAMUSCULAR; INTRAVENOUS at 07:46

## 2024-05-20 RX ADMIN — PIPERACILLIN AND TAZOBACTAM 3375 MG: 3; .375 INJECTION, POWDER, LYOPHILIZED, FOR SOLUTION INTRAVENOUS at 16:18

## 2024-05-20 RX ADMIN — CEFAZOLIN 3 G: 1 INJECTION, POWDER, FOR SOLUTION INTRAMUSCULAR; INTRAVENOUS at 07:47

## 2024-05-20 RX ADMIN — PHENYLEPHRINE HYDROCHLORIDE 100 MCG: 10 INJECTION INTRAVENOUS at 08:13

## 2024-05-20 RX ADMIN — OXYCODONE 5 MG: 5 TABLET ORAL at 10:38

## 2024-05-20 ASSESSMENT — PAIN SCALES - WONG BAKER
WONGBAKER_NUMERICALRESPONSE: HURTS A LITTLE BIT

## 2024-05-20 ASSESSMENT — PAIN DESCRIPTION - ORIENTATION
ORIENTATION: LOWER
ORIENTATION: LOWER
ORIENTATION: RIGHT;LEFT;LOWER

## 2024-05-20 ASSESSMENT — PAIN SCALES - GENERAL
PAINLEVEL_OUTOF10: 4
PAINLEVEL_OUTOF10: 6
PAINLEVEL_OUTOF10: 0
PAINLEVEL_OUTOF10: 7
PAINLEVEL_OUTOF10: 7
PAINLEVEL_OUTOF10: 5
PAINLEVEL_OUTOF10: 8
PAINLEVEL_OUTOF10: 2
PAINLEVEL_OUTOF10: 10

## 2024-05-20 ASSESSMENT — LIFESTYLE VARIABLES: SMOKING_STATUS: 1

## 2024-05-20 ASSESSMENT — PAIN DESCRIPTION - LOCATION
LOCATION: ABDOMEN;FLANK
LOCATION: ABDOMEN
LOCATION: ABDOMEN

## 2024-05-20 ASSESSMENT — PAIN DESCRIPTION - DESCRIPTORS
DESCRIPTORS: HEAVINESS;CRAMPING
DESCRIPTORS: CRAMPING
DESCRIPTORS: ACHING;CRAMPING;DISCOMFORT

## 2024-05-20 ASSESSMENT — PAIN - FUNCTIONAL ASSESSMENT
PAIN_FUNCTIONAL_ASSESSMENT: ACTIVITIES ARE NOT PREVENTED
PAIN_FUNCTIONAL_ASSESSMENT: 0-10

## 2024-05-20 ASSESSMENT — ENCOUNTER SYMPTOMS: SHORTNESS OF BREATH: 0

## 2024-05-20 NOTE — PLAN OF CARE
Problem: Discharge Planning  Goal: Discharge to home or other facility with appropriate resources  Outcome: Progressing     Problem: Pain  Goal: Verbalizes/displays adequate comfort level or baseline comfort level  Outcome: Progressing       Problem: Safety - Adult  Goal: Free from fall injury  Outcome: Progressing

## 2024-05-20 NOTE — ANESTHESIA POSTPROCEDURE EVALUATION
Department of Anesthesiology  Postprocedure Note    Patient: Evangelina King  MRN: [id number]  YOB: 1980  Date of evaluation: 5/20/2024    Procedure Summary       Date: 05/20/24 Room / Location: [address]    Anesthesia Start: 0735 Anesthesia Stop: 0856    Procedures:       DILATATION AND CURETTAGE HYSTEROSCOPY, VAGINAL / CERVIX. BIOPSY      CYSTOSCOPY RETROGRADE PYELOGRAM, BLADDER BIOPSY Diagnosis:       Thickened endometrium      (Thickened endometrium [R93.89])    Surgeons: Jewell Clayton MD; Ludin Barbosa MD Responsible Provider: Justen Monsalve MD    Anesthesia Type: general ASA Status: 2            Anesthesia Type: No value filed.    Analia Phase I: Analia Score: 9    Analia Phase II:      Anesthesia Post Evaluation    Patient location during evaluation: bedside  Patient participation: complete - patient participated  Level of consciousness: awake  Airway patency: patent  Nausea & Vomiting: no nausea and no vomiting  Cardiovascular status: blood pressure returned to baseline  Respiratory status: acceptable  Hydration status: euvolemic  Comments: /70   Pulse (!) 101   Temp 97 °F (36.1 °C) (Temporal)   Resp 15   Ht 1.702 m (5' 7\")   Wt 122.9 kg (271 lb)   LMP  (LMP Unknown)   SpO2 98%   BMI 42.44 kg/m²     Pain management: adequate    No notable events documented.

## 2024-05-20 NOTE — PLAN OF CARE
Problem: Discharge Planning  Goal: Discharge to home or other facility with appropriate resources  5/20/2024 1138 by Elana Nugent RN  Outcome: Progressing  5/20/2024 0323 by Kirsty Oconnro RN  Outcome: Progressing     Problem: Pain  Goal: Verbalizes/displays adequate comfort level or baseline comfort level  5/20/2024 1138 by Elana Nugent, RN  Outcome: Progressing  5/20/2024 0323 by Kirsty Oconnor, RN  Outcome: Progressing     Problem: Skin/Tissue Integrity  Goal: Absence of new skin breakdown  Description: 1.  Monitor for areas of redness and/or skin breakdown  2.  Assess vascular access sites hourly  3.  Every 4-6 hours minimum:  Change oxygen saturation probe site  4.  Every 4-6 hours:  If on nasal continuous positive airway pressure, respiratory therapy assess nares and determine need for appliance change or resting period.  5/20/2024 1138 by Elana Nugent, RN  Outcome: Progressing  5/20/2024 0323 by Kirsty Oconnor, RN  Outcome: Progressing     Problem: Safety - Adult  Goal: Free from fall injury  5/20/2024 1138 by Elana Nugent, RN  Outcome: Progressing  5/20/2024 0323 by Kirsty Oconnor, RN  Outcome: Progressing

## 2024-05-20 NOTE — OP NOTE
Operative Note      Patient: Evangelina King  YOB: 1980  MRN: [id number]    Date of Procedure: 5/20/2024    Preop diagnosis  Bilateral hydronephrosis  Cervical cancer concern    Post-Op Diagnosis: Same       Procedure:  Cystoscopy bladder biopsy and fulguration    Surgeon:  Ludin Barbosa MD       Assistant:   Lexus Diana MD PGY4      Anesthesia: General    Estimated Blood Loss (mL): Minimal    Complications: None    Specimens:   ID Type Source Tests Collected by Time Destination   A : BLADDER BIOPSY Tissue Bladder SURGICAL PATHOLOGY Jewell Clayton MD 5/20/2024 0818        Implants:  * No implants in log *      Drains: * No LDAs found *    Findings:  Infection Present At Time Of Surgery (PATOS) (choose all levels that have infection present):  No infection present  Other Findings: Invading malignancy on the trigone, unable to identify ureteral orifices but urine effflux was seen    Detailed Description of Procedure:   Patient was brought back to the operating room and prepped and draped in sterile fashion placed in dorsolithotomy position.  Timeout was performed.  22 Hebrew rigid cystoscope was entered via the urethra and significant invasive abnormality or malignancy with lesion seen on the floor of the bladder and across the trigone.  The area was easily friable and oozing.  The ureteral orifices were unable to be found after significant search however urine efflux was seen.  We were unable to place bilateral stents.  No retrograde pyelogram was able to be done.  A biopsy of one of the areas was done and sent off to pathology to confirm cervical cancer diagnosis.  Bugbee electrocautery was used to fulgurate the area with good hemostasis.  However all the other areas with malignancy were oozing mildly off and on. This concluded our procedure.  Attending physician was present throughout.    Electronically signed by Lexus Diana MD on 5/20/2024 at 8:21 AM

## 2024-05-20 NOTE — ANESTHESIA PRE PROCEDURE
Department of Anesthesiology  Preprocedure Note       Name:  Evangelina King   Age:  44 y.o.  :  1980                                          MRN:  [id number]         Date:  2024      Surgeon: Surgeon(s):  Jewell Clayton MD Santacroce, Dino, MD    Procedure: Procedure(s):  (*ADD ON* MOVED FROM ) DILATATION AND CURETTAGE HYSTEROSCOPY, CYSTOSCOPY, PAP SMEAR, POSS. BIOPSY, POSS UROLOGY ASSIST.  CYSTOSCOPY RETROGRADE PYELOGRAM, BILATERAL URETERAL STENT INSERTION    Medications prior to admission:   Prior to Admission medications    Not on File       Current medications:    Current Facility-Administered Medications   Medication Dose Route Frequency Provider Last Rate Last Admin   • [MAR Hold] 0.9 % sodium chloride infusion   IntraVENous Continuous Kathrin Figueroa  mL/hr at 24 2351 New Bag at 24   • [MAR Hold] acetaminophen (TYLENOL) tablet 1,000 mg  1,000 mg Oral Q6H PRN Kathrin Figueroa MD   1,000 mg at 24   • [MAR Hold] traMADol (ULTRAM) tablet 50 mg  50 mg Oral Q6H PRN Alexsandra Johnson MD   50 mg at 24   • [MAR Hold] oxyCODONE (ROXICODONE) immediate release tablet 5 mg  5 mg Oral Q4H PRN Koki Ceron MD   5 mg at 24   • [MAR Hold] ondansetron (ZOFRAN-ODT) disintegrating tablet 4 mg  4 mg Oral Q8H PRN Koki Ceron MD        Or   • [MAR Hold] ondansetron (ZOFRAN) injection 4 mg  4 mg IntraVENous Q6H PRN Koki Ceron MD   4 mg at 24   • [MAR Hold] gabapentin (NEURONTIN) capsule 300 mg  300 mg Oral TID PRN Koki Ceron MD   300 mg at 24   • [MAR Hold] cyclobenzaprine (FLEXERIL) tablet 10 mg  10 mg Oral TID PRN Koki Ceron MD   10 mg at 24   • [MAR Hold] potassium chloride (KLOR-CON M) extended release tablet 40 mEq  40 mEq Oral Daily Leti Dupree DO   40 mEq at 24 0817    Or   • [MAR Hold] potassium bicarb-citric acid (EFFER-K) effervescent tablet 40 mEq  40 mEq Oral Daily

## 2024-05-20 NOTE — BRIEF OP NOTE
Brief Operative Note  Department of Obstetrics and Gynecology  [facility]     Patient: Evangelina King   : 1980  MRN: [id number]       Acct: [id number]   Date of Procedure: 24     Pre-operative Diagnosis: 44 y.o. female  Pelvic mass concerning for malignancy  Sepsis likely secondary to malignancy  Thickened endometrium  Anemia  Hydronephrosis  Acute kidney injury  BMI 42    Post-operative Diagnosis:   Stage 4 suspected cervical cancer  Pelvic mass concerning for malignancy  Sepsis likely secondary to malignancy  Thickened endometrium  Anemia  Hydronephrosis  Acute kidney injury  BMI 42    Procedure: exam under anesthesia, vaginoscopy, biopsy of endometrium and cervical mass; Cytoscopy, bladder tissue biopsy, attempted ureteral stent placement    Surgeon: Dr. Clayton     Assistant(s): Mercy Villatoro DO, PGY3; Pawan Wyman MD, PGY2    Anesthesia: general      Findings:  normal appearing external genitalia, stage 4 suspected cervical cancer due to firm mass involving cervix, vagina, bilateral parametria and fixed to sacrum, limited mobility of uterus due to mass, uterine fundus measuring up to umbilicus, mass visualized within vagina with possibly normal vaginal mucosa, significantly proliferative endometrium with necrotic tissue, bladder invasion into bladder ridge and bilateral ureteral orifices  Total IV fluids/Blood products:  1000 ml crystalloid  Urine Output:  not measured due to cystoscopy    Estimated blood loss:  20mL  Drains:  none  Specimens:  vaginal tissue culture, endometrial tissue biopsy, cervical tissue biopsy; bladder tissue biopsy per Urology  Instrument and Sponge Count: Correct  Complications:  none  Condition:  stable, transferred to post anesthesia recovery    See full operative report for further details.    Mercy Villatoro DO  Ob/Gyn Resident  2024, 9:34 AM      Attending Physician Statement  I was present, scrubbed and participated in the entire case. I agree

## 2024-05-20 NOTE — DISCHARGE INSTRUCTIONS
symptoms are gone for 12 to 48 hours. Other good choices include dry toast, crackers, cooked cereal, and gelatin dessert, such as Jell-O.  Take care of your urinary tract to prevent problems such as infection, which can be caused by cervical cancer and its treatment. Limit drinks with caffeine and drink plenty of fluids.  When should you call for help?   Call 911 anytime you think you may need emergency care. For example, call if:    You passed out (lost consciousness).   Call your doctor now or seek immediate medical care if:    You have a fever or chills. Or you may be sweating.     You have abnormal bleeding.     You think you have an infection.     You have new or worse pain.     You have new symptoms, such as a cough, belly pain, vomiting, diarrhea, or a rash.   Watch closely for changes in your health, and be sure to contact your doctor if:    You are much more tired than usual.     You have swollen glands in your armpits, groin, or neck.     You do not get better as expected.   Where can you learn more?  Go to https://www.[employer].net/patientEd and enter F505 to learn more about \"Cervical Cancer: Care Instructions.\"  Current as of: October 25, 2023               Content Version: 14.0  © 2006-2024 [employer].   Care instructions adapted under license by [employer]. If you have questions about a medical condition or this instruction, always ask your healthcare professional. [employer] disclaims any warranty or liability for your use of this information.

## 2024-05-21 ENCOUNTER — APPOINTMENT (OUTPATIENT)
Dept: INTERVENTIONAL RADIOLOGY/VASCULAR | Age: 44
DRG: 744 | End: 2024-05-21
Attending: OBSTETRICS & GYNECOLOGY
Payer: COMMERCIAL

## 2024-05-21 DIAGNOSIS — C54.1 ENDOMETRIAL CA (HCC): Primary | ICD-10-CM

## 2024-05-21 DIAGNOSIS — C54.1 ENDOMETRIAL CANCER (HCC): Primary | ICD-10-CM

## 2024-05-21 PROBLEM — N13.9 OBSTRUCTIVE UROPATHY: Status: ACTIVE | Noted: 2024-05-21

## 2024-05-21 PROBLEM — N85.8 UTERINE MASS: Status: ACTIVE | Noted: 2024-05-21

## 2024-05-21 PROBLEM — N85.2 ENLARGED UTERUS: Status: ACTIVE | Noted: 2024-05-21

## 2024-05-21 PROBLEM — N13.4 HYDROURETER ON LEFT: Status: ACTIVE | Noted: 2024-05-21

## 2024-05-21 PROBLEM — N13.4 HYDROURETER, RIGHT: Status: ACTIVE | Noted: 2024-05-21

## 2024-05-21 PROBLEM — N17.9 SEPSIS WITH ACUTE RENAL FAILURE WITHOUT SEPTIC SHOCK (HCC): Status: ACTIVE | Noted: 2024-05-21

## 2024-05-21 PROBLEM — A41.9 SEPSIS WITH ACUTE RENAL FAILURE WITHOUT SEPTIC SHOCK (HCC): Status: ACTIVE | Noted: 2024-05-21

## 2024-05-21 PROBLEM — R59.1 LYMPHADENOPATHY: Status: ACTIVE | Noted: 2024-05-21

## 2024-05-21 PROBLEM — N93.9 ABNORMAL UTERINE BLEEDING: Status: ACTIVE | Noted: 2024-05-21

## 2024-05-21 PROBLEM — R65.20 SEPSIS WITH ACUTE RENAL FAILURE WITHOUT SEPTIC SHOCK (HCC): Status: ACTIVE | Noted: 2024-05-21

## 2024-05-21 PROBLEM — N88.9 LESION OF CERVIX: Status: ACTIVE | Noted: 2024-05-21

## 2024-05-21 LAB
ALBUMIN SERPL-MCNC: 3.2 G/DL (ref 3.5–5.2)
ALBUMIN/GLOB SERPL: 1 {RATIO} (ref 1–2.5)
ALP SERPL-CCNC: 188 U/L (ref 35–104)
ALT SERPL-CCNC: 6 U/L (ref 10–35)
ANION GAP SERPL CALCULATED.3IONS-SCNC: 10 MMOL/L (ref 9–16)
AST SERPL-CCNC: 25 U/L (ref 10–35)
BASOPHILS # BLD: 0 K/UL (ref 0–0.2)
BASOPHILS NFR BLD: 0 % (ref 0–2)
BILIRUB SERPL-MCNC: <0.2 MG/DL (ref 0–1.2)
BUN SERPL-MCNC: 14 MG/DL (ref 6–20)
CALCIUM SERPL-MCNC: 8.4 MG/DL (ref 8.6–10.4)
CHLORIDE SERPL-SCNC: 104 MMOL/L (ref 98–107)
CO2 SERPL-SCNC: 21 MMOL/L (ref 20–31)
CREAT SERPL-MCNC: 1.6 MG/DL (ref 0.5–0.9)
EOSINOPHIL # BLD: 0 K/UL (ref 0–0.44)
EOSINOPHILS RELATIVE PERCENT: 0 % (ref 1–4)
ERYTHROCYTE [DISTWIDTH] IN BLOOD BY AUTOMATED COUNT: 19.1 % (ref 11.8–14.4)
GFR, ESTIMATED: 41 ML/MIN/1.73M2
GLUCOSE SERPL-MCNC: 138 MG/DL (ref 74–99)
HCT VFR BLD AUTO: 24.6 % (ref 36.3–47.1)
HCT VFR BLD AUTO: 28.3 % (ref 36.3–47.1)
HGB BLD-MCNC: 6.9 G/DL (ref 11.9–15.1)
HGB BLD-MCNC: 7.9 G/DL (ref 11.9–15.1)
IMM GRANULOCYTES # BLD AUTO: 0.12 K/UL (ref 0–0.3)
IMM GRANULOCYTES NFR BLD: 1 %
LYMPHOCYTES NFR BLD: 1.39 K/UL (ref 1.1–3.7)
LYMPHOCYTES RELATIVE PERCENT: 12 % (ref 24–43)
MCH RBC QN AUTO: 19.4 PG (ref 25.2–33.5)
MCHC RBC AUTO-ENTMCNC: 28 G/DL (ref 28.4–34.8)
MCV RBC AUTO: 69.3 FL (ref 82.6–102.9)
MONOCYTES NFR BLD: 0.93 K/UL (ref 0.1–1.2)
MONOCYTES NFR BLD: 8 % (ref 3–12)
MORPHOLOGY: ABNORMAL
NEUTROPHILS NFR BLD: 79 % (ref 36–65)
NEUTS SEG NFR BLD: 9.16 K/UL (ref 1.5–8.1)
NRBC BLD-RTO: 0 PER 100 WBC
PLATELET # BLD AUTO: 280 K/UL (ref 138–453)
PMV BLD AUTO: 9.6 FL (ref 8.1–13.5)
POTASSIUM SERPL-SCNC: 4.3 MMOL/L (ref 3.7–5.3)
PROT SERPL-MCNC: 6.2 G/DL (ref 6.6–8.7)
RBC # BLD AUTO: 3.55 M/UL (ref 3.95–5.11)
SODIUM SERPL-SCNC: 135 MMOL/L (ref 136–145)
WBC OTHER # BLD: 11.6 K/UL (ref 3.5–11.3)

## 2024-05-21 PROCEDURE — 85018 HEMOGLOBIN: CPT

## 2024-05-21 PROCEDURE — 6370000000 HC RX 637 (ALT 250 FOR IP): Performed by: STUDENT IN AN ORGANIZED HEALTH CARE EDUCATION/TRAINING PROGRAM

## 2024-05-21 PROCEDURE — 6360000004 HC RX CONTRAST MEDICATION: Performed by: RADIOLOGY

## 2024-05-21 PROCEDURE — 36430 TRANSFUSION BLD/BLD COMPNT: CPT

## 2024-05-21 PROCEDURE — 30233N1 TRANSFUSION OF NONAUTOLOGOUS RED BLOOD CELLS INTO PERIPHERAL VEIN, PERCUTANEOUS APPROACH: ICD-10-PCS | Performed by: STUDENT IN AN ORGANIZED HEALTH CARE EDUCATION/TRAINING PROGRAM

## 2024-05-21 PROCEDURE — 86920 COMPATIBILITY TEST SPIN: CPT

## 2024-05-21 PROCEDURE — 86900 BLOOD TYPING SEROLOGIC ABO: CPT

## 2024-05-21 PROCEDURE — 80053 COMPREHEN METABOLIC PANEL: CPT

## 2024-05-21 PROCEDURE — 99255 IP/OBS CONSLTJ NEW/EST HI 80: CPT | Performed by: INTERNAL MEDICINE

## 2024-05-21 PROCEDURE — 0T9330Z DRAINAGE OF RIGHT KIDNEY PELVIS WITH DRAINAGE DEVICE, PERCUTANEOUS APPROACH: ICD-10-PCS | Performed by: RADIOLOGY

## 2024-05-21 PROCEDURE — 36415 COLL VENOUS BLD VENIPUNCTURE: CPT

## 2024-05-21 PROCEDURE — 0T9430Z DRAINAGE OF LEFT KIDNEY PELVIS WITH DRAINAGE DEVICE, PERCUTANEOUS APPROACH: ICD-10-PCS | Performed by: RADIOLOGY

## 2024-05-21 PROCEDURE — 86901 BLOOD TYPING SEROLOGIC RH(D): CPT

## 2024-05-21 PROCEDURE — 50432 PLMT NEPHROSTOMY CATHETER: CPT

## 2024-05-21 PROCEDURE — 6360000002 HC RX W HCPCS: Performed by: INTERNAL MEDICINE

## 2024-05-21 PROCEDURE — 6360000002 HC RX W HCPCS: Performed by: STUDENT IN AN ORGANIZED HEALTH CARE EDUCATION/TRAINING PROGRAM

## 2024-05-21 PROCEDURE — 85025 COMPLETE CBC W/AUTO DIFF WBC: CPT

## 2024-05-21 PROCEDURE — 85014 HEMATOCRIT: CPT

## 2024-05-21 PROCEDURE — 2580000003 HC RX 258: Performed by: STUDENT IN AN ORGANIZED HEALTH CARE EDUCATION/TRAINING PROGRAM

## 2024-05-21 PROCEDURE — 6360000002 HC RX W HCPCS: Performed by: RADIOLOGY

## 2024-05-21 PROCEDURE — 99152 MOD SED SAME PHYS/QHP 5/>YRS: CPT

## 2024-05-21 PROCEDURE — 99153 MOD SED SAME PHYS/QHP EA: CPT

## 2024-05-21 PROCEDURE — C1729 CATH, DRAINAGE: HCPCS

## 2024-05-21 PROCEDURE — 86850 RBC ANTIBODY SCREEN: CPT

## 2024-05-21 PROCEDURE — P9016 RBC LEUKOCYTES REDUCED: HCPCS

## 2024-05-21 PROCEDURE — 1200000000 HC SEMI PRIVATE

## 2024-05-21 RX ORDER — FENTANYL CITRATE 50 UG/ML
INJECTION, SOLUTION INTRAMUSCULAR; INTRAVENOUS PRN
Status: COMPLETED | OUTPATIENT
Start: 2024-05-21 | End: 2024-05-21

## 2024-05-21 RX ORDER — ONDANSETRON 2 MG/ML
INJECTION INTRAMUSCULAR; INTRAVENOUS PRN
Status: COMPLETED | OUTPATIENT
Start: 2024-05-21 | End: 2024-05-21

## 2024-05-21 RX ORDER — MIDAZOLAM HYDROCHLORIDE 2 MG/2ML
INJECTION, SOLUTION INTRAMUSCULAR; INTRAVENOUS PRN
Status: COMPLETED | OUTPATIENT
Start: 2024-05-21 | End: 2024-05-21

## 2024-05-21 RX ORDER — SODIUM CHLORIDE 9 MG/ML
INJECTION, SOLUTION INTRAVENOUS PRN
Status: DISCONTINUED | OUTPATIENT
Start: 2024-05-21 | End: 2024-05-23 | Stop reason: HOSPADM

## 2024-05-21 RX ORDER — SODIUM CHLORIDE 9 MG/ML
INJECTION, SOLUTION INTRAVENOUS PRN
Status: DISCONTINUED | OUTPATIENT
Start: 2024-05-21 | End: 2024-05-21

## 2024-05-21 RX ADMIN — ONDANSETRON 4 MG: 2 INJECTION INTRAMUSCULAR; INTRAVENOUS at 06:19

## 2024-05-21 RX ADMIN — PIPERACILLIN AND TAZOBACTAM 3375 MG: 3; .375 INJECTION, POWDER, LYOPHILIZED, FOR SOLUTION INTRAVENOUS at 17:27

## 2024-05-21 RX ADMIN — OXYCODONE 5 MG: 5 TABLET ORAL at 20:35

## 2024-05-21 RX ADMIN — FENTANYL CITRATE 50 MCG: 50 INJECTION, SOLUTION INTRAMUSCULAR; INTRAVENOUS at 15:41

## 2024-05-21 RX ADMIN — SODIUM CHLORIDE, PRESERVATIVE FREE 10 ML: 5 INJECTION INTRAVENOUS at 06:19

## 2024-05-21 RX ADMIN — GABAPENTIN 300 MG: 300 CAPSULE ORAL at 18:38

## 2024-05-21 RX ADMIN — CYCLOBENZAPRINE 10 MG: 10 TABLET, FILM COATED ORAL at 18:38

## 2024-05-21 RX ADMIN — BENZOCAINE AND MENTHOL 1 LOZENGE: 15; 3.6 LOZENGE ORAL at 08:31

## 2024-05-21 RX ADMIN — PIPERACILLIN AND TAZOBACTAM 3375 MG: 3; .375 INJECTION, POWDER, LYOPHILIZED, FOR SOLUTION INTRAVENOUS at 10:23

## 2024-05-21 RX ADMIN — OXYCODONE 5 MG: 5 TABLET ORAL at 06:19

## 2024-05-21 RX ADMIN — CYCLOBENZAPRINE 10 MG: 10 TABLET, FILM COATED ORAL at 10:25

## 2024-05-21 RX ADMIN — MIDAZOLAM HYDROCHLORIDE 0.5 MG: 1 INJECTION, SOLUTION INTRAMUSCULAR; INTRAVENOUS at 15:41

## 2024-05-21 RX ADMIN — IOPAMIDOL 30 ML: 755 INJECTION, SOLUTION INTRAVENOUS at 16:12

## 2024-05-21 RX ADMIN — MIDAZOLAM HYDROCHLORIDE 1 MG: 1 INJECTION, SOLUTION INTRAMUSCULAR; INTRAVENOUS at 15:17

## 2024-05-21 RX ADMIN — LORAZEPAM 0.5 MG: 0.5 TABLET ORAL at 16:34

## 2024-05-21 RX ADMIN — ONDANSETRON 4 MG: 2 INJECTION INTRAMUSCULAR; INTRAVENOUS at 15:28

## 2024-05-21 RX ADMIN — HYDROMORPHONE HYDROCHLORIDE 1 MG: 1 INJECTION, SOLUTION INTRAMUSCULAR; INTRAVENOUS; SUBCUTANEOUS at 22:42

## 2024-05-21 RX ADMIN — LORAZEPAM 0.5 MG: 0.5 TABLET ORAL at 12:08

## 2024-05-21 RX ADMIN — OXYCODONE 5 MG: 5 TABLET ORAL at 10:26

## 2024-05-21 RX ADMIN — ONDANSETRON 4 MG: 2 INJECTION INTRAMUSCULAR; INTRAVENOUS at 13:36

## 2024-05-21 RX ADMIN — SENNOSIDES 17.2 MG: 8.6 TABLET, FILM COATED ORAL at 08:31

## 2024-05-21 RX ADMIN — HYDROMORPHONE HYDROCHLORIDE 1 MG: 1 INJECTION, SOLUTION INTRAMUSCULAR; INTRAVENOUS; SUBCUTANEOUS at 17:26

## 2024-05-21 RX ADMIN — FENTANYL CITRATE 50 MCG: 50 INJECTION, SOLUTION INTRAMUSCULAR; INTRAVENOUS at 15:17

## 2024-05-21 RX ADMIN — PIPERACILLIN AND TAZOBACTAM 3375 MG: 3; .375 INJECTION, POWDER, LYOPHILIZED, FOR SOLUTION INTRAVENOUS at 01:24

## 2024-05-21 RX ADMIN — POTASSIUM CHLORIDE 40 MEQ: 1500 TABLET, EXTENDED RELEASE ORAL at 08:33

## 2024-05-21 RX ADMIN — BENZOCAINE AND MENTHOL 1 LOZENGE: 15; 3.6 LOZENGE ORAL at 10:20

## 2024-05-21 RX ADMIN — LORAZEPAM 0.5 MG: 0.5 TABLET ORAL at 20:35

## 2024-05-21 RX ADMIN — SODIUM CHLORIDE 20 ML: 9 INJECTION, SOLUTION INTRAVENOUS at 10:22

## 2024-05-21 RX ADMIN — OXYCODONE 5 MG: 5 TABLET ORAL at 16:34

## 2024-05-21 RX ADMIN — LORAZEPAM 0.5 MG: 0.5 TABLET ORAL at 06:19

## 2024-05-21 RX ADMIN — MIDAZOLAM HYDROCHLORIDE 0.5 MG: 1 INJECTION, SOLUTION INTRAMUSCULAR; INTRAVENOUS at 15:28

## 2024-05-21 RX ADMIN — FENTANYL CITRATE 50 MCG: 50 INJECTION, SOLUTION INTRAMUSCULAR; INTRAVENOUS at 15:57

## 2024-05-21 RX ADMIN — SENNOSIDES 17.2 MG: 8.6 TABLET, FILM COATED ORAL at 20:35

## 2024-05-21 ASSESSMENT — PAIN SCALES - GENERAL
PAINLEVEL_OUTOF10: 8
PAINLEVEL_OUTOF10: 4
PAINLEVEL_OUTOF10: 5
PAINLEVEL_OUTOF10: 3
PAINLEVEL_OUTOF10: 7
PAINLEVEL_OUTOF10: 9

## 2024-05-21 ASSESSMENT — PAIN SCALES - WONG BAKER
WONGBAKER_NUMERICALRESPONSE: HURTS A LITTLE BIT

## 2024-05-21 ASSESSMENT — ENCOUNTER SYMPTOMS
EYE DISCHARGE: 0
ABDOMINAL PAIN: 1
SHORTNESS OF BREATH: 0
APNEA: 0
COLOR CHANGE: 0
ABDOMINAL DISTENTION: 1
SORE THROAT: 0
ABDOMINAL DISTENTION: 0

## 2024-05-21 ASSESSMENT — PAIN DESCRIPTION - DESCRIPTORS
DESCRIPTORS: SHOOTING;STABBING;SHARP
DESCRIPTORS: SHARP;JABBING
DESCRIPTORS: SHARP
DESCRIPTORS: SHOOTING;SHARP
DESCRIPTORS: ACHING;CRAMPING

## 2024-05-21 ASSESSMENT — PAIN DESCRIPTION - LOCATION
LOCATION: FLANK
LOCATION: ABDOMEN
LOCATION: FLANK;BACK
LOCATION: BACK;FLANK
LOCATION: BACK;FLANK

## 2024-05-21 ASSESSMENT — PAIN DESCRIPTION - ORIENTATION
ORIENTATION: RIGHT
ORIENTATION: LOWER
ORIENTATION: LEFT;RIGHT
ORIENTATION: RIGHT
ORIENTATION: RIGHT;LEFT

## 2024-05-21 NOTE — PLAN OF CARE
Problem: Discharge Planning  Goal: Discharge to home or other facility with appropriate resources  Outcome: Progressing  Flowsheets (Taken 5/21/2024 0800)  Discharge to home or other facility with appropriate resources: Identify barriers to discharge with patient and caregiver     Problem: Pain  Goal: Verbalizes/displays adequate comfort level or baseline comfort level  Outcome: Progressing  Flowsheets (Taken 5/21/2024 1330)  Verbalizes/displays adequate comfort level or baseline comfort level: Encourage patient to monitor pain and request assistance     Problem: Skin/Tissue Integrity  Goal: Absence of new skin breakdown  Description: 1.  Monitor for areas of redness and/or skin breakdown  2.  Assess vascular access sites hourly  3.  Every 4-6 hours minimum:  Change oxygen saturation probe site  4.  Every 4-6 hours:  If on nasal continuous positive airway pressure, respiratory therapy assess nares and determine need for appliance change or resting period.  Outcome: Progressing

## 2024-05-21 NOTE — OP NOTE
OPERATIVE NOTE  PATIENT: Evangelina King  YOB: 1980  MRN: [id number]  DATE OF PROCEDURE: 05/18/2024     PRE-OP DIAGNOSIS:   Abnormal uterine bleeding  Pelvic pain  Sepsis  Enlarged uterus  Uterine mass  Abnormal cervix  Abnormal parametria  Abnormal cul-de-dac  Numerous adenopathy  Bilateral hydroureter  Class III obesity (BMI 42)     POST-OP DIAGNOSIS:   Abnormal uterine bleeding  Pelvic pain  Sepsis  Enlarged uterus  Uterine mass  Abnormal cervix  Abnormal parametria  Abnormal cul-de-dac  Numerous adenopathy  Bilateral hydroureter  Class III obesity (BMI 42)       PROCEDURE:  Exam under anesthesia + Dilation & Curettage, with hysteroscopy + Vaginal and Cervix biopsies, by Dr. Jewell Clayton  2. Cystoscopy retrograde pyelogram, Bladder Biopsy, by Dr. Ludin Sow     SURGEON: Jewell Clayton MD  CO-SURGEON: Ludin Kinsey MD     ASSISTANT:  Adelaida Villatoro DO;   Pawan Wyman MD     ANESTHESIA: General     ESTIMATED BLOOD LOSS (mL): 20cc     COMPLICATIONS: None noted at the end of the procedure     SPECIMENS:   ID Type Source Tests Collected by Time   1 : VAGINAL BIOPSY Tissue Tissue CULTURE, TISSUE Jewell Clayton MD 5/20/2024 0838   A : BLADDER BIOPSY Tissue Bladder SURGICAL PATHOLOGY Jewell Clayton MD 5/20/2024 0818   B : ENDOMETRIAL AND CERVICAL CURETTINGS Tissue Endometrium SURGICAL PATHOLOGY Jewell Clayton MD 5/20/2024 0842      INDICATIONS:  43yo with a 2 year history of menstrual cycles, pelvic pain and difficulty with urination. Admitted for treatment of sepsis, unknown etiology. MRI pelvis demonstrates findings of a 15cm uterus, with a 11cm endometrial mass, with invasion into the cervix stroma, bladder, left parametria and cul-de-sac and extensive pelvic adenopathy and bilateral hydroureter (05/18/24). Recommendations were made to proceed with surgical evaluation. She has been advised of the potential risks, benefits and alternatives to the procedure. Informed consent

## 2024-05-21 NOTE — CONSULTS
[facility]  Urology Consult      Patient:  Evangelina King  MRN: [id number]  YOB: 1980    CHIEF COMPLAINT:  Bilateral hydronephrosis, possible invasion of malignant process into the bladder from uterus    HISTORY OF PRESENT ILLNESS:   The patient is a 44 y.o. female with PMH history of cervical ca 9 years ago who presented as a transfer from [facility] with back and abdominal pain as well as foul smelling vaginal bleeding and possible gross hematuria for the past several months. CT and MRI imaging demonstrates bilateral hydronephrosis, mild, down to the level of an abnormally enlarged endometrium with gas concerning for either infectious or necrotic malignant process, and T2 weight MRI shows filling defect in the posterior bladder wall concerning for bladder lesion or invasion from uterine process. Patient was febrile upon arrival, and tachycardic. She was started on cefepime and then given zosyn. Clinically at the time of urology examination she was AFVSS laying in bed appearing well aside from being in pain in her back. Patient denies any prior urological history aside from UTIs when she was a child. She denies any history of lower urinary tract symptoms, gross hematuria, kidney stones, and no urological malignancies in her family. She does say that her maternal aunt did have a malignant uterine condition for which she underwent hysterectomy. Patient has been smoking on and off about a half pack a day since 16 years old. She is a current smoker and vapes as well. She says she has had increasing difficulty urinating for the last few weeks and recently felt a \"pop\" and a clot fall into the toilet after which she was able to fully empty her bladder. Lactate was 2.4 earlier today, after bolus and recheck it has improved to 1.5. Cr is elevated at 1.5 from 1.3 yesterday, with unknown true baseline other than a value of 0.63 in 2019. WBC is 16.9 improved from 18 yesterday. Hb 
Dr Chadwick, OB/GYN Resident at bedside to assess patient.  Orders received.  
has never used smokeless tobacco. She reports that she does not currently use alcohol.     Family History: family history is not on file.    REVIEW OF SYSTEMS:    Constitutional: No fever or chills. No night sweats, no weight loss   Eyes: No eye discharge, double vision, or eye pain   HEENT: negative for sore mouth, sore throat, hoarseness and voice change   Respiratory: negative for cough , sputum, dyspnea, wheezing, hemoptysis, chest pain   Cardiovascular: negative for chest pain, dyspnea, palpitations, orthopnea, PND   Gastrointestinal: negative for nausea, vomiting, diarrhea, constipation, abdominal pain, Dysphagia, hematemesis and hematochezia   Genitourinary: negative for frequency, + bilateral nephrostomy   Integument: negative for rash, skin lesions, bruises.   Hematologic/Lymphatic: negative for easy bruising, bleeding, lymphadenopathy, or petechiae   Endocrine: negative for heat or cold intolerance,weight changes, change in bowel habits and hair loss   Musculoskeletal: negative for myalgias, arthralgias, pain, joint swelling,and bone pain   Neurological: negative for headaches, dizziness, seizures, weakness, numbness    PHYSICAL EXAM:      BP (!) 155/103   Pulse 88   Temp 98.1 °F (36.7 °C) (Oral)   Resp 16   Ht 1.702 m (5' 7\")   Wt 122.9 kg (271 lb)   LMP  (LMP Unknown)   SpO2 97%   BMI 42.44 kg/m²    Temp (24hrs), Av.1 °F (36.7 °C), Min:97.7 °F (36.5 °C), Max:98.9 °F (37.2 °C)    General appearance - ill appearing in significant pain   Mental status - alert and cooperative   Eyes - pupils equal and reactive, extraocular eye movements intact   Ears - bilateral TM's and external ear canals normal   Mouth - mucous membranes moist, pharynx normal without lesions   Neck - supple, no significant adenopathy   Lymphatics - no palpable lymphadenopathy, no hepatosplenomegaly   Chest - clear to auscultation, no wheezes, rales or rhonchi, symmetric air entry   Heart - normal rate, regular rhythm, normal 
potassium chloride (KLOR-CON M) extended release tablet 40 mEq  40 mEq Oral Daily Wyand, Leti K, DO   40 mEq at 05/18/24 0817    Or    potassium bicarb-citric acid (EFFER-K) effervescent tablet 40 mEq  40 mEq Oral Daily Wyand, Leti K, DO        Or    potassium chloride 10 mEq/100 mL IVPB (Peripheral Line)  10 mEq IntraVENous Daily Wyand, Leti K, DO        sodium chloride 0.9 % bolus 30 mL  30 mL IntraVENous Once Koki Ceron MD        sodium chloride flush 0.9 % injection 5-40 mL  5-40 mL IntraVENous 2 times per day Kathrin Figueroa MD        sodium chloride flush 0.9 % injection 5-40 mL  5-40 mL IntraVENous PRN Kathrin Figueroa MD        0.9 % sodium chloride infusion   IntraVENous PRN Kathrin Figueroa MD        piperacillin-tazobactam (ZOSYN) 3,375 mg in sodium chloride 0.9 % 50 mL IVPB (mini-bag)  3,375 mg IntraVENous Q8H Kathrin Figueroa MD 12.5 mL/hr at 05/19/24 0411 3,375 mg at 05/19/24 0411    sennosides-docusate sodium (SENOKOT-S) 8.6-50 MG tablet 2 tablet  2 tablet Oral BID Kathrin Figueroa MD   2 tablet at 05/18/24 2357    oxyCODONE (ROXICODONE) immediate release tablet 5 mg  5 mg Oral Q6H PRN Kathrin Figueroa MD           FAMILY HISTORY:  Patient denies any significant family history    SOCIAL HISTORY:   reports that she has been smoking cigarettes. She has never used smokeless tobacco. She reports that she does not currently use alcohol.    VITALS:  Vitals:    05/18/24 1353 05/18/24 2037 05/18/24 2247 05/19/24 0400   BP:  (!) 140/85 137/60 121/67   Pulse: (!) 116 (!) 114 (!) 107 96   Resp: 23 22 20 16   Temp: (!) 101.3 °F (38.5 °C) (!) 100.6 °F (38.1 °C) 99.4 °F (37.4 °C) 97.6 °F (36.4 °C)   TempSrc: Oral Oral Oral Oral   SpO2:  97% 96% 96%                        INPUT/OUTPUT:  I/O this shift:  In: 4016.7 [I.V.:816.4; IV Piggyback:3200.3]  Out: -   In: 4016.7 [I.V.:816.4]  Out: -                                                                                                                              
MD Ludin at [facility] OR    CYSTOSCOPY W/ RETROGRADES  05/20/2024    CYSTOSCOPY RETROGRADE PYELOGRAM, BLADDER BIOPSY    DILATION AND CURETTAGE OF UTERUS  05/20/2024    DILATATION AND CURETTAGE HYSTEROSCOPY, VAGINAL / CERVIX. BIOPSY    DILATION AND CURETTAGE OF UTERUS N/A 5/20/2024    DILATATION AND CURETTAGE HYSTEROSCOPY, VAGINAL / CERVIX. BIOPSY performed by Jewell Clayton MD at [facility] OR    IR NEPHROSTOMY PERCUTANEOUS LEFT  5/21/2024    IR NEPHROSTOMY PERCUTANEOUS LEFT 5/21/2024 Daniel Gonsalez MD [facility] SPECIAL PROCEDURES    IR NEPHROSTOMY PERCUTANEOUS RIGHT  5/21/2024    IR NEPHROSTOMY PERCUTANEOUS RIGHT 5/21/2024 Daniel Gonsalez MD [facility] SPECIAL PROCEDURES       Medications:      acetaminophen  1,000 mg Oral 4 times per day    gabapentin  600 mg Oral TID    lidocaine  2 patch TransDERmal Daily    enoxaparin  30 mg SubCUTAneous BID    [START ON 5/23/2024] ferrous sulfate  325 mg Oral Daily with breakfast    senna  2 tablet Oral BID    potassium chloride  40 mEq Oral Daily    Or    potassium alternative oral replacement  40 mEq Oral Daily    Or    potassium chloride  10 mEq IntraVENous Daily    sodium chloride flush  5-40 mL IntraVENous 2 times per day    piperacillin-tazobactam  3,375 mg IntraVENous Q8H       Social History:     Social History     Socioeconomic History    Marital status:      Spouse name: Not on file    Number of children: Not on file    Years of education: Not on file    Highest education level: Not on file   Occupational History    Not on file   Tobacco Use    Smoking status: Every Day     Types: Cigarettes    Smokeless tobacco: Never   Substance and Sexual Activity    Alcohol use: Not Currently    Drug use: Not on file    Sexual activity: Not on file   Other Topics Concern    Not on file   Social History Narrative    Not on file     Social Determinants of Health     Financial Resource Strain: Not on file   Food Insecurity: No Food Insecurity (5/20/2024)    Hunger Vital Sign     
Absolute 0.17 0.00 - 0.30 k/uL    Neutrophils Absolute 15.04 (H) 1.8 - 7.7 k/uL    Lymphocytes Absolute 0.68 (L) 1.0 - 4.8 k/uL    Monocytes Absolute 1.01 (H) 0.1 - 0.8 k/uL    Eosinophils Absolute 0.00 0.0 - 0.4 k/uL    Basophils Absolute 0.00 0.0 - 0.2 k/uL    Morphology MICROCYTOSIS PRESENT     Morphology ANISOCYTOSIS PRESENT    Reticulocytes    Collection Time: 05/18/24  9:08 AM   Result Value Ref Range    Retic % 1.5 0.5 - 1.9 %    Absolute Retic # 0.060 0.030 - 0.080 M/uL    Immature Retic Fract 22.7 (H) 2.7 - 18.3 %    Retic Hemoglobin 18.2 (L) 28.2 - 35.7 pg       Imaging:   US NON OB TRANSVAGINAL    Result Date: 5/18/2024  Limited evaluation of the pelvic organs due to artifact. Abnormal air and fluid in the endometrial canal without evidence of Doppler flow.  This is not well evaluated due to the artifact.  Recommend gynecologic consultation.  MRI of the pelvis with and without contrast may be helpful for further imaging evaluation. Nonvisualization of the right ovary. Unremarkable left ovary.     US PELVIS COMPLETE    Result Date: 5/18/2024  Limited evaluation of the pelvic organs due to artifact. Abnormal air and fluid in the endometrial canal without evidence of Doppler flow.  This is not well evaluated due to the artifact.  Recommend gynecologic consultation.  MRI of the pelvis with and without contrast may be helpful for further imaging evaluation. Nonvisualization of the right ovary. Unremarkable left ovary.     CT ABDOMEN PELVIS WO CONTRAST Additional Contrast? None    Result Date: 5/17/2024  1. Enlarged uterus with thickened endometrium measuring up to 3.9 cm in thickness with a focal area of gas noted in the upper segment.  This is of uncertain etiology and could be related to recent procedure either related to a D and C or recent pregnancy or postpartum state.  If there is no recent intervention or procedure, differential considerations could include cervical stenosis from treated malignancy or

## 2024-05-21 NOTE — CONSENT
Informed Consent for Blood Component Transfusion Note    I have discussed with the patient the rationale for blood component transfusion; its benefits in treating or preventing fatigue, organ damage, or death; and its risk which includes mild transfusion reactions, rare risk of blood borne infection, or more serious but rare reactions. I have discussed the alternatives to transfusion, including the risk and consequences of not receiving transfusion. The patient had an opportunity to ask questions and had agreed to proceed with transfusion of blood components.    Electronically signed by Mercy Villatoro DO on 5/21/24 at 8:17 AM EDT

## 2024-05-21 NOTE — BRIEF OP NOTE
Brief Postoperative Note    Evangelina King  YOB: 1980  [id number]    Pre-operative Diagnosis: Hydronephrosis    Post-operative Diagnosis: Same    Procedure: B/L nephrostomy tube placement     Anesthesia: Local and Moderate Sedation    Surgeons/Assistants: MD Wallace    Estimated Blood Loss: less than 50     Complications: None    Specimens: Was Not Obtained    Findings: Successful placement of bilateral 10 fr percutaneous nephrostomy tubes.    Electronically signed by SYLVIA Callahan on 5/21/2024 at 4:09 PM

## 2024-05-21 NOTE — PLAN OF CARE
Problem: Discharge Planning  Goal: Discharge to home or other facility with appropriate resources  5/21/2024 0054 by Magdaleno Mancia RN  Outcome: Progressing  5/20/2024 1138 by Elana Nugent RN  Outcome: Progressing     Problem: Pain  Goal: Verbalizes/displays adequate comfort level or baseline comfort level  5/21/2024 0054 by Magdaleno Mancia RN  Outcome: Progressing  5/20/2024 1138 by Elana Nugent RN  Outcome: Progressing     Problem: Skin/Tissue Integrity  Goal: Absence of new skin breakdown  Description: 1.  Monitor for areas of redness and/or skin breakdown  2.  Assess vascular access sites hourly  3.  Every 4-6 hours minimum:  Change oxygen saturation probe site  4.  Every 4-6 hours:  If on nasal continuous positive airway pressure, respiratory therapy assess nares and determine need for appliance change or resting period.  5/21/2024 0054 by Magdaleno Mancia RN  Outcome: Progressing  5/20/2024 1138 by Elana Nugent RN  Outcome: Progressing     Problem: Safety - Adult  Goal: Free from fall injury  5/21/2024 0054 by Magdaleno Mancia RN  Outcome: Progressing  5/20/2024 1138 by Elana Nugent RN  Outcome: Progressing

## 2024-05-22 ENCOUNTER — TELEPHONE (OUTPATIENT)
Dept: ONCOLOGY | Age: 44
End: 2024-05-22

## 2024-05-22 PROBLEM — R65.10 SIRS (SYSTEMIC INFLAMMATORY RESPONSE SYNDROME) (HCC): Status: ACTIVE | Noted: 2024-05-22

## 2024-05-22 PROBLEM — N39.0 SEPSIS DUE TO URINARY TRACT INFECTION (HCC): Status: ACTIVE | Noted: 2024-05-21

## 2024-05-22 PROBLEM — D72.825 BANDEMIA: Status: ACTIVE | Noted: 2024-05-22

## 2024-05-22 PROBLEM — N71.9 UTERINE ABSCESS: Status: ACTIVE | Noted: 2024-05-21

## 2024-05-22 PROBLEM — N82.8: Status: ACTIVE | Noted: 2024-05-22

## 2024-05-22 PROBLEM — R78.81 BACTEREMIA DUE TO CLOSTRIDIUM SPECIES: Status: ACTIVE | Noted: 2024-05-22

## 2024-05-22 PROBLEM — N17.9 AKI (ACUTE KIDNEY INJURY) (HCC): Status: ACTIVE | Noted: 2024-05-22

## 2024-05-22 PROBLEM — B96.89 BACTEREMIA DUE TO CLOSTRIDIUM SPECIES: Status: ACTIVE | Noted: 2024-05-22

## 2024-05-22 PROBLEM — N13.39 OTHER HYDRONEPHROSIS: Status: ACTIVE | Noted: 2024-05-22

## 2024-05-22 PROBLEM — N85.8 UTERINE MASS: Status: ACTIVE | Noted: 2024-05-18

## 2024-05-22 LAB
ABO/RH: NORMAL
ANION GAP SERPL CALCULATED.3IONS-SCNC: 11 MMOL/L (ref 9–16)
ANION GAP SERPL CALCULATED.3IONS-SCNC: 9 MMOL/L (ref 9–16)
ANTIBODY SCREEN: NEGATIVE
ARM BAND NUMBER: NORMAL
BLOOD BANK BLOOD PRODUCT EXPIRATION DATE: NORMAL
BLOOD BANK DISPENSE STATUS: NORMAL
BLOOD BANK ISBT PRODUCT BLOOD TYPE: 6200
BLOOD BANK PRODUCT CODE: NORMAL
BLOOD BANK SAMPLE EXPIRATION: NORMAL
BLOOD BANK UNIT TYPE AND RH: NORMAL
BPU ID: NORMAL
BUN SERPL-MCNC: 11 MG/DL (ref 6–20)
BUN SERPL-MCNC: 13 MG/DL (ref 6–20)
CALCIUM SERPL-MCNC: 8.2 MG/DL (ref 8.6–10.4)
CALCIUM SERPL-MCNC: 8.9 MG/DL (ref 8.6–10.4)
CHLORIDE SERPL-SCNC: 103 MMOL/L (ref 98–107)
CHLORIDE SERPL-SCNC: 105 MMOL/L (ref 98–107)
CO2 SERPL-SCNC: 22 MMOL/L (ref 20–31)
CO2 SERPL-SCNC: 24 MMOL/L (ref 20–31)
COMPONENT: NORMAL
CREAT SERPL-MCNC: 1.5 MG/DL (ref 0.5–0.9)
CREAT SERPL-MCNC: 1.7 MG/DL (ref 0.5–0.9)
CROSSMATCH RESULT: NORMAL
ERYTHROCYTE [DISTWIDTH] IN BLOOD BY AUTOMATED COUNT: 19.4 % (ref 11.8–14.4)
GFR, ESTIMATED: 38 ML/MIN/1.73M2
GFR, ESTIMATED: 45 ML/MIN/1.73M2
GLUCOSE SERPL-MCNC: 113 MG/DL (ref 74–99)
GLUCOSE SERPL-MCNC: 94 MG/DL (ref 74–99)
HCT VFR BLD AUTO: 29.2 % (ref 36.3–47.1)
HGB BLD-MCNC: 8.4 G/DL (ref 11.9–15.1)
MCH RBC QN AUTO: 20 PG (ref 25.2–33.5)
MCHC RBC AUTO-ENTMCNC: 28.8 G/DL (ref 28.4–34.8)
MCV RBC AUTO: 69.4 FL (ref 82.6–102.9)
NRBC BLD-RTO: 0 PER 100 WBC
PLATELET # BLD AUTO: 299 K/UL (ref 138–453)
PMV BLD AUTO: 9.8 FL (ref 8.1–13.5)
POTASSIUM SERPL-SCNC: 4.1 MMOL/L (ref 3.7–5.3)
POTASSIUM SERPL-SCNC: 4.1 MMOL/L (ref 3.7–5.3)
RBC # BLD AUTO: 4.21 M/UL (ref 3.95–5.11)
SODIUM SERPL-SCNC: 136 MMOL/L (ref 136–145)
SODIUM SERPL-SCNC: 138 MMOL/L (ref 136–145)
SURGICAL PATHOLOGY REPORT: NORMAL
TRANSFUSION STATUS: NORMAL
UNIT DIVISION: 0
UNIT ISSUE DATE/TIME: NORMAL
WBC OTHER # BLD: 12.6 K/UL (ref 3.5–11.3)

## 2024-05-22 PROCEDURE — 6370000000 HC RX 637 (ALT 250 FOR IP): Performed by: STUDENT IN AN ORGANIZED HEALTH CARE EDUCATION/TRAINING PROGRAM

## 2024-05-22 PROCEDURE — 2580000003 HC RX 258: Performed by: STUDENT IN AN ORGANIZED HEALTH CARE EDUCATION/TRAINING PROGRAM

## 2024-05-22 PROCEDURE — 76937 US GUIDE VASCULAR ACCESS: CPT

## 2024-05-22 PROCEDURE — 6360000002 HC RX W HCPCS: Performed by: STUDENT IN AN ORGANIZED HEALTH CARE EDUCATION/TRAINING PROGRAM

## 2024-05-22 PROCEDURE — 99232 SBSQ HOSP IP/OBS MODERATE 35: CPT | Performed by: INTERNAL MEDICINE

## 2024-05-22 PROCEDURE — 6370000000 HC RX 637 (ALT 250 FOR IP)

## 2024-05-22 PROCEDURE — 99254 IP/OBS CNSLTJ NEW/EST MOD 60: CPT | Performed by: INTERNAL MEDICINE

## 2024-05-22 PROCEDURE — 6370000000 HC RX 637 (ALT 250 FOR IP): Performed by: OBSTETRICS & GYNECOLOGY

## 2024-05-22 PROCEDURE — 1200000000 HC SEMI PRIVATE

## 2024-05-22 PROCEDURE — 80048 BASIC METABOLIC PNL TOTAL CA: CPT

## 2024-05-22 PROCEDURE — 85027 COMPLETE CBC AUTOMATED: CPT

## 2024-05-22 PROCEDURE — 05HF33Z INSERTION OF INFUSION DEVICE INTO LEFT CEPHALIC VEIN, PERCUTANEOUS APPROACH: ICD-10-PCS

## 2024-05-22 PROCEDURE — 6360000002 HC RX W HCPCS: Performed by: INTERNAL MEDICINE

## 2024-05-22 PROCEDURE — 36415 COLL VENOUS BLD VENIPUNCTURE: CPT

## 2024-05-22 RX ORDER — OXYCODONE HYDROCHLORIDE 5 MG/1
5 TABLET ORAL EVERY 6 HOURS PRN
Status: DISCONTINUED | OUTPATIENT
Start: 2024-05-22 | End: 2024-05-23 | Stop reason: HOSPADM

## 2024-05-22 RX ORDER — UREA 10 %
325 LOTION (ML) TOPICAL
Status: DISCONTINUED | OUTPATIENT
Start: 2024-05-23 | End: 2024-05-23 | Stop reason: HOSPADM

## 2024-05-22 RX ORDER — TRAMADOL HYDROCHLORIDE 50 MG/1
50 TABLET ORAL EVERY 6 HOURS PRN
Status: DISCONTINUED | OUTPATIENT
Start: 2024-05-22 | End: 2024-05-23 | Stop reason: HOSPADM

## 2024-05-22 RX ORDER — SERTRALINE HYDROCHLORIDE 25 MG/1
25 TABLET, FILM COATED ORAL DAILY
Qty: 30 TABLET | Refills: 3 | Status: SHIPPED | OUTPATIENT
Start: 2024-05-22

## 2024-05-22 RX ORDER — HYDROXYZINE HYDROCHLORIDE 25 MG/1
25 TABLET, FILM COATED ORAL EVERY 8 HOURS PRN
Qty: 30 TABLET | Refills: 0 | Status: SHIPPED | OUTPATIENT
Start: 2024-05-22 | End: 2024-06-01

## 2024-05-22 RX ORDER — POLYETHYLENE GLYCOL 3350 17 G/17G
17 POWDER, FOR SOLUTION ORAL ONCE
Status: COMPLETED | OUTPATIENT
Start: 2024-05-22 | End: 2024-05-22

## 2024-05-22 RX ORDER — LIDOCAINE 4 G/G
1 PATCH TOPICAL DAILY
Qty: 30 PATCH | Refills: 0 | Status: SHIPPED | OUTPATIENT
Start: 2024-05-22 | End: 2024-06-21

## 2024-05-22 RX ORDER — ACETAMINOPHEN 500 MG
1000 TABLET ORAL EVERY 6 HOURS SCHEDULED
Status: DISCONTINUED | OUTPATIENT
Start: 2024-05-22 | End: 2024-05-23 | Stop reason: HOSPADM

## 2024-05-22 RX ORDER — ENOXAPARIN SODIUM 100 MG/ML
30 INJECTION SUBCUTANEOUS 2 TIMES DAILY
Status: DISCONTINUED | OUTPATIENT
Start: 2024-05-22 | End: 2024-05-23 | Stop reason: HOSPADM

## 2024-05-22 RX ORDER — GABAPENTIN 600 MG/1
600 TABLET ORAL 3 TIMES DAILY
Status: DISCONTINUED | OUTPATIENT
Start: 2024-05-22 | End: 2024-05-23 | Stop reason: HOSPADM

## 2024-05-22 RX ORDER — BISACODYL 5 MG/1
10 TABLET, DELAYED RELEASE ORAL DAILY PRN
Status: DISCONTINUED | OUTPATIENT
Start: 2024-05-22 | End: 2024-05-23 | Stop reason: HOSPADM

## 2024-05-22 RX ORDER — OXYCODONE HYDROCHLORIDE 5 MG/1
5 TABLET ORAL EVERY 6 HOURS PRN
Qty: 20 TABLET | Refills: 0 | Status: SHIPPED | OUTPATIENT
Start: 2024-05-22 | End: 2024-05-23 | Stop reason: HOSPADM

## 2024-05-22 RX ORDER — LIDOCAINE 4 G/G
2 PATCH TOPICAL DAILY
Status: DISCONTINUED | OUTPATIENT
Start: 2024-05-22 | End: 2024-05-23 | Stop reason: HOSPADM

## 2024-05-22 RX ORDER — OXYCODONE HYDROCHLORIDE 5 MG/1
10 TABLET ORAL EVERY 6 HOURS PRN
Status: DISCONTINUED | OUTPATIENT
Start: 2024-05-22 | End: 2024-05-23 | Stop reason: HOSPADM

## 2024-05-22 RX ADMIN — OXYCODONE 5 MG: 5 TABLET ORAL at 08:26

## 2024-05-22 RX ADMIN — LORAZEPAM 0.5 MG: 0.5 TABLET ORAL at 11:23

## 2024-05-22 RX ADMIN — LORAZEPAM 0.5 MG: 0.5 TABLET ORAL at 00:34

## 2024-05-22 RX ADMIN — MAGNESIUM HYDROXIDE 30 ML: 400 SUSPENSION ORAL at 15:16

## 2024-05-22 RX ADMIN — OXYCODONE 5 MG: 5 TABLET ORAL at 04:34

## 2024-05-22 RX ADMIN — BISACODYL 10 MG: 5 TABLET, COATED ORAL at 15:16

## 2024-05-22 RX ADMIN — GABAPENTIN 600 MG: 600 TABLET, FILM COATED ORAL at 15:16

## 2024-05-22 RX ADMIN — PIPERACILLIN AND TAZOBACTAM 3375 MG: 3; .375 INJECTION, POWDER, LYOPHILIZED, FOR SOLUTION INTRAVENOUS at 00:36

## 2024-05-22 RX ADMIN — SENNOSIDES 17.2 MG: 8.6 TABLET, FILM COATED ORAL at 08:24

## 2024-05-22 RX ADMIN — ACETAMINOPHEN 1000 MG: 500 TABLET ORAL at 11:21

## 2024-05-22 RX ADMIN — ONDANSETRON 4 MG: 4 TABLET, ORALLY DISINTEGRATING ORAL at 08:24

## 2024-05-22 RX ADMIN — ENOXAPARIN SODIUM 30 MG: 100 INJECTION SUBCUTANEOUS at 10:13

## 2024-05-22 RX ADMIN — CYCLOBENZAPRINE 10 MG: 10 TABLET, FILM COATED ORAL at 11:22

## 2024-05-22 RX ADMIN — SENNOSIDES 17.2 MG: 8.6 TABLET, FILM COATED ORAL at 21:07

## 2024-05-22 RX ADMIN — GABAPENTIN 600 MG: 600 TABLET, FILM COATED ORAL at 21:07

## 2024-05-22 RX ADMIN — TRAMADOL HYDROCHLORIDE 50 MG: 50 TABLET ORAL at 21:13

## 2024-05-22 RX ADMIN — POLYETHYLENE GLYCOL 3350 17 G: 17 POWDER, FOR SOLUTION ORAL at 04:33

## 2024-05-22 RX ADMIN — LORAZEPAM 0.5 MG: 0.5 TABLET ORAL at 15:16

## 2024-05-22 RX ADMIN — OXYCODONE 5 MG: 5 TABLET ORAL at 00:33

## 2024-05-22 RX ADMIN — POTASSIUM CHLORIDE 40 MEQ: 1500 TABLET, EXTENDED RELEASE ORAL at 08:26

## 2024-05-22 RX ADMIN — PIPERACILLIN AND TAZOBACTAM 3375 MG: 3; .375 INJECTION, POWDER, LYOPHILIZED, FOR SOLUTION INTRAVENOUS at 10:31

## 2024-05-22 RX ADMIN — ACETAMINOPHEN 1000 MG: 500 TABLET ORAL at 17:20

## 2024-05-22 RX ADMIN — CYCLOBENZAPRINE 10 MG: 10 TABLET, FILM COATED ORAL at 03:37

## 2024-05-22 RX ADMIN — PIPERACILLIN AND TAZOBACTAM 3375 MG: 3; .375 INJECTION, POWDER, LYOPHILIZED, FOR SOLUTION INTRAVENOUS at 17:24

## 2024-05-22 RX ADMIN — OXYCODONE 10 MG: 5 TABLET ORAL at 15:16

## 2024-05-22 RX ADMIN — GABAPENTIN 600 MG: 600 TABLET, FILM COATED ORAL at 08:25

## 2024-05-22 RX ADMIN — GABAPENTIN 300 MG: 300 CAPSULE ORAL at 03:37

## 2024-05-22 RX ADMIN — HYDROMORPHONE HYDROCHLORIDE 1 MG: 1 INJECTION, SOLUTION INTRAMUSCULAR; INTRAVENOUS; SUBCUTANEOUS at 11:24

## 2024-05-22 RX ADMIN — ENOXAPARIN SODIUM 30 MG: 100 INJECTION SUBCUTANEOUS at 21:08

## 2024-05-22 ASSESSMENT — PAIN DESCRIPTION - ORIENTATION
ORIENTATION: RIGHT
ORIENTATION: LEFT;RIGHT
ORIENTATION: RIGHT;LEFT;LOWER
ORIENTATION: RIGHT
ORIENTATION: RIGHT

## 2024-05-22 ASSESSMENT — PAIN SCALES - GENERAL
PAINLEVEL_OUTOF10: 9
PAINLEVEL_OUTOF10: 4
PAINLEVEL_OUTOF10: 1
PAINLEVEL_OUTOF10: 3
PAINLEVEL_OUTOF10: 4
PAINLEVEL_OUTOF10: 7
PAINLEVEL_OUTOF10: 2
PAINLEVEL_OUTOF10: 8
PAINLEVEL_OUTOF10: 10
PAINLEVEL_OUTOF10: 8
PAINLEVEL_OUTOF10: 8
PAINLEVEL_OUTOF10: 3
PAINLEVEL_OUTOF10: 9

## 2024-05-22 ASSESSMENT — PAIN DESCRIPTION - DESCRIPTORS
DESCRIPTORS: DISCOMFORT
DESCRIPTORS: ACHING;CRAMPING
DESCRIPTORS: DISCOMFORT
DESCRIPTORS: DISCOMFORT
DESCRIPTORS: ACHING;JABBING
DESCRIPTORS: CRAMPING;ACHING

## 2024-05-22 ASSESSMENT — PAIN DESCRIPTION - LOCATION
LOCATION: FLANK
LOCATION: FLANK;ABDOMEN
LOCATION: BACK;FLANK
LOCATION: FLANK
LOCATION: FLANK

## 2024-05-22 ASSESSMENT — ENCOUNTER SYMPTOMS
CONSTIPATION: 1
ABDOMINAL DISTENTION: 0
SHORTNESS OF BREATH: 0
ABDOMINAL PAIN: 1
SORE THROAT: 0

## 2024-05-22 NOTE — PLAN OF CARE
Problem: Discharge Planning  Goal: Discharge to home or other facility with appropriate resources  5/22/2024 1744 by Lesly Paulino RN  Outcome: Progressing  Flowsheets (Taken 5/22/2024 0800)  Discharge to home or other facility with appropriate resources: Identify barriers to discharge with patient and caregiver  5/22/2024 0552 by Preethi Sanchez RN  Outcome: Progressing     Problem: Pain  Goal: Verbalizes/displays adequate comfort level or baseline comfort level  5/22/2024 1744 by Lesly Paulino RN  Outcome: Progressing  5/22/2024 0552 by Preethi Sanchez RN  Outcome: Progressing     Problem: Skin/Tissue Integrity  Goal: Absence of new skin breakdown  Description: 1.  Monitor for areas of redness and/or skin breakdown  2.  Assess vascular access sites hourly  3.  Every 4-6 hours minimum:  Change oxygen saturation probe site  4.  Every 4-6 hours:  If on nasal continuous positive airway pressure, respiratory therapy assess nares and determine need for appliance change or resting period.  5/22/2024 1744 by Lesly Paulino RN  Outcome: Progressing  5/22/2024 0552 by Preethi Sanchez RN  Outcome: Progressing     Problem: Safety - Adult  Goal: Free from fall injury  5/22/2024 1744 by Lesly Paulino RN  Outcome: Progressing  5/22/2024 0552 by Preethi Sanchez RN  Outcome: Progressing

## 2024-05-22 NOTE — PROCEDURES
PROCEDURE NOTE  Date: 5/22/2024   Name: Evangelina King  YOB: 1980    Procedures    Midline insertion note:    PICC ordered - ID okay with midline due to duration of therapy     Reason for placement: Ertapenem till 6/1/2024 per ID  Device inserted -  22g 8cm midline  Ultrasound preassessment done. Target vessel is left cephalic vein.   Vessel depth = 1 cm.   Vein measures .58 cm. CVR is 2.3 %. (Preffered area-based CVR is less than 20%).  Placed using ANTT fashion, US prepared with sterile probe cover.   Visualization of needle entry into vessel, 1 attempt using AST technique.   Total 8 cm inserted, 0cm external.   Lot # = [id number]  Expires = 01/31/2025  Easy aspiration of dark non-pulsating blood. Flushes easily with 10ml of 0.9 NS.   Catheter secured with adhesive securement device.   Dressing placed - CHG-TSM.   Injection cap and swab cap applied.   No bleeding or hematoma noted.   Estimated blood loss = 1 ml.   Device should have blood return, if no blood return assess for infiltration and/or call VAT for consult.    Instructions given on insertion and care.  RN aware no lab draws without a physician order, line is power injectable, keep clamped when not in use, pulsatile 10ml NS flush every 8 hours when not in use or after intermittent use with medication. Line ready for use.    Powerglide Midline catheter education:     [ x ] Post care line insertion was discussed with patient prior to procedure. Risks, benefits, alternatives, and reason for procedure were discussed and teaching was reinforced. An educational handout on post insertion line care and maintenance was left at bedside with patient. Patient acknowledged understanding of information relayed.

## 2024-05-22 NOTE — TELEPHONE ENCOUNTER
Name: Evangelina King  : 1980  MRN: [id number]    Oncology Navigation- Initial Note:(INPATIENT ONCOLOGY NAVIGATION REFERRAL)    Intake-  Contact Type: Telephone    Notes: Received inpatient oncology navigation referral.  Upon review of chart noted pt continues admitted @ [facility].  Will follow closely & contact pt upon d/c.     Electronically signed by Jessa Skaggs RN on 2024 at 3:33 PM

## 2024-05-22 NOTE — PLAN OF CARE
BMP reviewed, creatinine has improved to 1.5.    Internal medicine medicine to sign off    Recommendations:  Continue monitor BMP.  Patient to have outpatient sleep study.  Patient should follow-up with outpatient BMP results in 4 days with primary care physician.    Electronically signed by Srinivas Houston MD on 5/22/2024 at 5:19 PM    Srinivas Houston MD  Internal Medicine Resident, PGY- 2  [place], [place].  5:19 PM     This note is created with the assistance of a speech-recognition program. While intending to generate a document that actually reflects the content of the visit, no guarantees can be provided that every mistake has been identified and corrected by editing.

## 2024-05-22 NOTE — PLAN OF CARE
Problem: Discharge Planning  Goal: Discharge to home or other facility with appropriate resources  5/22/2024 0552 by Preethi Sanchez RN  Outcome: Progressing  5/21/2024 1754 by Lesly Paulino RN  Outcome: Progressing  Flowsheets (Taken 5/21/2024 0800)  Discharge to home or other facility with appropriate resources: Identify barriers to discharge with patient and caregiver     Problem: Pain  Goal: Verbalizes/displays adequate comfort level or baseline comfort level  5/22/2024 0552 by Preethi Sanchez RN  Outcome: Progressing  5/21/2024 1754 by Lesly Paulino RN  Outcome: Progressing  Flowsheets (Taken 5/21/2024 1330)  Verbalizes/displays adequate comfort level or baseline comfort level: Encourage patient to monitor pain and request assistance     Problem: Skin/Tissue Integrity  Goal: Absence of new skin breakdown  Description: 1.  Monitor for areas of redness and/or skin breakdown  2.  Assess vascular access sites hourly  3.  Every 4-6 hours minimum:  Change oxygen saturation probe site  4.  Every 4-6 hours:  If on nasal continuous positive airway pressure, respiratory therapy assess nares and determine need for appliance change or resting period.  5/22/2024 0552 by Preethi Sanchez RN  Outcome: Progressing  5/21/2024 1754 by Lesly Paulino RN  Outcome: Progressing     Problem: Safety - Adult  Goal: Free from fall injury  5/22/2024 0552 by Preethi Sanchez RN  Outcome: Progressing  5/21/2024 1754 by Lesly Paulino RN  Outcome: Progressing

## 2024-05-23 ENCOUNTER — TELEPHONE (OUTPATIENT)
Dept: ONCOLOGY | Age: 44
End: 2024-05-23

## 2024-05-23 VITALS
HEART RATE: 86 BPM | RESPIRATION RATE: 18 BRPM | BODY MASS INDEX: 42.53 KG/M2 | WEIGHT: 271 LBS | OXYGEN SATURATION: 95 % | DIASTOLIC BLOOD PRESSURE: 76 MMHG | TEMPERATURE: 97.8 F | HEIGHT: 67 IN | SYSTOLIC BLOOD PRESSURE: 130 MMHG

## 2024-05-23 LAB
ANION GAP SERPL CALCULATED.3IONS-SCNC: 11 MMOL/L (ref 9–16)
BUN SERPL-MCNC: 8 MG/DL (ref 6–20)
CALCIUM SERPL-MCNC: 8.7 MG/DL (ref 8.6–10.4)
CHLORIDE SERPL-SCNC: 103 MMOL/L (ref 98–107)
CO2 SERPL-SCNC: 24 MMOL/L (ref 20–31)
CREAT SERPL-MCNC: 1.4 MG/DL (ref 0.5–0.9)
GFR, ESTIMATED: 49 ML/MIN/1.73M2
GLUCOSE SERPL-MCNC: 88 MG/DL (ref 74–99)
MICROORGANISM SPEC CULT: NORMAL
MICROORGANISM SPEC CULT: NORMAL
POTASSIUM SERPL-SCNC: 4.3 MMOL/L (ref 3.7–5.3)
SERVICE CMNT-IMP: NORMAL
SERVICE CMNT-IMP: NORMAL
SODIUM SERPL-SCNC: 138 MMOL/L (ref 136–145)
SPECIMEN DESCRIPTION: NORMAL
SPECIMEN DESCRIPTION: NORMAL

## 2024-05-23 PROCEDURE — 6370000000 HC RX 637 (ALT 250 FOR IP): Performed by: OBSTETRICS & GYNECOLOGY

## 2024-05-23 PROCEDURE — 6360000002 HC RX W HCPCS: Performed by: STUDENT IN AN ORGANIZED HEALTH CARE EDUCATION/TRAINING PROGRAM

## 2024-05-23 PROCEDURE — 80048 BASIC METABOLIC PNL TOTAL CA: CPT

## 2024-05-23 PROCEDURE — 2580000003 HC RX 258: Performed by: INTERNAL MEDICINE

## 2024-05-23 PROCEDURE — 2580000003 HC RX 258: Performed by: STUDENT IN AN ORGANIZED HEALTH CARE EDUCATION/TRAINING PROGRAM

## 2024-05-23 PROCEDURE — 6370000000 HC RX 637 (ALT 250 FOR IP): Performed by: STUDENT IN AN ORGANIZED HEALTH CARE EDUCATION/TRAINING PROGRAM

## 2024-05-23 PROCEDURE — 36415 COLL VENOUS BLD VENIPUNCTURE: CPT

## 2024-05-23 PROCEDURE — 6370000000 HC RX 637 (ALT 250 FOR IP)

## 2024-05-23 PROCEDURE — 99232 SBSQ HOSP IP/OBS MODERATE 35: CPT | Performed by: INTERNAL MEDICINE

## 2024-05-23 PROCEDURE — 6360000002 HC RX W HCPCS: Performed by: INTERNAL MEDICINE

## 2024-05-23 PROCEDURE — A4216 STERILE WATER/SALINE, 10 ML: HCPCS | Performed by: INTERNAL MEDICINE

## 2024-05-23 RX ORDER — PROCHLORPERAZINE MALEATE 5 MG/1
5 TABLET ORAL EVERY 6 HOURS PRN
Status: DISCONTINUED | OUTPATIENT
Start: 2024-05-23 | End: 2024-05-23 | Stop reason: HOSPADM

## 2024-05-23 RX ADMIN — ACETAMINOPHEN 1000 MG: 500 TABLET ORAL at 13:03

## 2024-05-23 RX ADMIN — MAGNESIUM HYDROXIDE 30 ML: 400 SUSPENSION ORAL at 08:39

## 2024-05-23 RX ADMIN — ACETAMINOPHEN 1000 MG: 500 TABLET ORAL at 04:31

## 2024-05-23 RX ADMIN — PIPERACILLIN AND TAZOBACTAM 3375 MG: 3; .375 INJECTION, POWDER, LYOPHILIZED, FOR SOLUTION INTRAVENOUS at 01:05

## 2024-05-23 RX ADMIN — PIPERACILLIN AND TAZOBACTAM 3375 MG: 3; .375 INJECTION, POWDER, LYOPHILIZED, FOR SOLUTION INTRAVENOUS at 09:09

## 2024-05-23 RX ADMIN — SODIUM CHLORIDE, PRESERVATIVE FREE 10 ML: 5 INJECTION INTRAVENOUS at 08:40

## 2024-05-23 RX ADMIN — SENNOSIDES 17.2 MG: 8.6 TABLET, FILM COATED ORAL at 08:39

## 2024-05-23 RX ADMIN — ENOXAPARIN SODIUM 30 MG: 100 INJECTION SUBCUTANEOUS at 08:40

## 2024-05-23 RX ADMIN — SODIUM CHLORIDE 1000 MG: 9 INJECTION INTRAMUSCULAR; INTRAVENOUS; SUBCUTANEOUS at 14:44

## 2024-05-23 RX ADMIN — GABAPENTIN 600 MG: 600 TABLET, FILM COATED ORAL at 08:40

## 2024-05-23 RX ADMIN — FERROUS SULFATE TAB EC 325 MG (65 MG FE EQUIVALENT) 325 MG: 325 (65 FE) TABLET DELAYED RESPONSE at 08:39

## 2024-05-23 RX ADMIN — TRAMADOL HYDROCHLORIDE 50 MG: 50 TABLET ORAL at 13:03

## 2024-05-23 RX ADMIN — POTASSIUM CHLORIDE 40 MEQ: 1500 TABLET, EXTENDED RELEASE ORAL at 08:39

## 2024-05-23 RX ADMIN — TRAMADOL HYDROCHLORIDE 50 MG: 50 TABLET ORAL at 04:31

## 2024-05-23 RX ADMIN — GABAPENTIN 600 MG: 600 TABLET, FILM COATED ORAL at 13:03

## 2024-05-23 ASSESSMENT — ENCOUNTER SYMPTOMS
EYE DISCHARGE: 0
APNEA: 0
COLOR CHANGE: 0

## 2024-05-23 ASSESSMENT — PAIN DESCRIPTION - DESCRIPTORS
DESCRIPTORS: ACHING;SQUEEZING
DESCRIPTORS: ACHING

## 2024-05-23 ASSESSMENT — PAIN DESCRIPTION - LOCATION
LOCATION: ABDOMEN
LOCATION: FLANK

## 2024-05-23 ASSESSMENT — PAIN - FUNCTIONAL ASSESSMENT: PAIN_FUNCTIONAL_ASSESSMENT: ACTIVITIES ARE NOT PREVENTED

## 2024-05-23 ASSESSMENT — PAIN SCALES - GENERAL
PAINLEVEL_OUTOF10: 2
PAINLEVEL_OUTOF10: 10
PAINLEVEL_OUTOF10: 4
PAINLEVEL_OUTOF10: 6

## 2024-05-23 ASSESSMENT — PAIN DESCRIPTION - ORIENTATION
ORIENTATION: RIGHT
ORIENTATION: LOWER

## 2024-05-23 NOTE — PLAN OF CARE
Problem: Discharge Planning  Goal: Discharge to home or other facility with appropriate resources  5/23/2024 0413 by Preethi Sanchez RN  Outcome: Progressing  5/23/2024 0225 by Preethi Sanchez RN  Outcome: Progressing  5/22/2024 1744 by Lesly Paulino RN  Outcome: Progressing  Flowsheets (Taken 5/22/2024 0800)  Discharge to home or other facility with appropriate resources: Identify barriers to discharge with patient and caregiver     Problem: Pain  Goal: Verbalizes/displays adequate comfort level or baseline comfort level  5/23/2024 0413 by Preethi Sanchez RN  Outcome: Progressing  5/23/2024 0225 by Preethi Sanchez RN  Outcome: Progressing  5/22/2024 1744 by Lesly Paulino RN  Outcome: Progressing     Problem: Skin/Tissue Integrity  Goal: Absence of new skin breakdown  Description: 1.  Monitor for areas of redness and/or skin breakdown  2.  Assess vascular access sites hourly  3.  Every 4-6 hours minimum:  Change oxygen saturation probe site  4.  Every 4-6 hours:  If on nasal continuous positive airway pressure, respiratory therapy assess nares and determine need for appliance change or resting period.  5/23/2024 0413 by Preethi Sanchez RN  Outcome: Progressing  5/23/2024 0225 by Preethi Sanchez RN  Outcome: Progressing  5/22/2024 1744 by Lesly Paulino RN  Outcome: Progressing     Problem: Safety - Adult  Goal: Free from fall injury  5/23/2024 0413 by Preethi Sanchez RN  Outcome: Progressing  5/23/2024 0225 by Preethi Sanchez RN  Outcome: Progressing  5/22/2024 1744 by Lesly Paulino RN  Outcome: Progressing     Problem: ABCDS Injury Assessment  Goal: Absence of physical injury  5/23/2024 0413 by Preethi Sanchez RN  Outcome: Progressing  5/23/2024 0225 by Preethi Sanchez RN  Outcome: Progressing  5/22/2024 1744 by Lesly Paulino RN  Outcome: Progressing

## 2024-05-23 NOTE — PLAN OF CARE
Problem: Discharge Planning  Goal: Discharge to home or other facility with appropriate resources  5/23/2024 0225 by Preethi Sanchez RN  Outcome: Progressing  5/22/2024 1744 by Lesly Paulino RN  Outcome: Progressing  Flowsheets (Taken 5/22/2024 0800)  Discharge to home or other facility with appropriate resources: Identify barriers to discharge with patient and caregiver     Problem: Pain  Goal: Verbalizes/displays adequate comfort level or baseline comfort level  5/23/2024 0225 by Preethi Sanchez RN  Outcome: Progressing  5/22/2024 1744 by Lesly Paulino RN  Outcome: Progressing     Problem: Skin/Tissue Integrity  Goal: Absence of new skin breakdown  Description: 1.  Monitor for areas of redness and/or skin breakdown  2.  Assess vascular access sites hourly  3.  Every 4-6 hours minimum:  Change oxygen saturation probe site  4.  Every 4-6 hours:  If on nasal continuous positive airway pressure, respiratory therapy assess nares and determine need for appliance change or resting period.  5/23/2024 0225 by Preethi Sanchez RN  Outcome: Progressing  5/22/2024 1744 by Lesly Paulino RN  Outcome: Progressing     Problem: Safety - Adult  Goal: Free from fall injury  5/23/2024 0225 by Preethi Sanchez RN  Outcome: Progressing  5/22/2024 1744 by Lesly Paulino RN  Outcome: Progressing     Problem: ABCDS Injury Assessment  Goal: Absence of physical injury  5/23/2024 0225 by Preethi Sanchez RN  Outcome: Progressing  5/22/2024 1744 by Lesly Paulino RN  Outcome: Progressing

## 2024-05-23 NOTE — PLAN OF CARE
Problem: Discharge Planning  Goal: Discharge to home or other facility with appropriate resources  5/23/2024 1723 by Randall Brown RN  Outcome: Adequate for Discharge     Problem: Pain  Goal: Verbalizes/displays adequate comfort level or baseline comfort level  5/23/2024 1723 by Randall Brown RN  Outcome: Adequate for Discharge     Problem: Skin/Tissue Integrity  Goal: Absence of new skin breakdown  Description: 1.  Monitor for areas of redness and/or skin breakdown  2.  Assess vascular access sites hourly  3.  Every 4-6 hours minimum:  Change oxygen saturation probe site  4.  Every 4-6 hours:  If on nasal continuous positive airway pressure, respiratory therapy assess nares and determine need for appliance change or resting period.  5/23/2024 1723 by Randall Brown RN  Outcome: Adequate for Discharge     Problem: Safety - Adult  Goal: Free from fall injury  5/23/2024 1723 by Randall Brown RN  Outcome: Adequate for Discharge     Problem: ABCDS Injury Assessment  Goal: Absence of physical injury  5/23/2024 1723 by Randall Brown RN  Outcome: Adequate for Discharge

## 2024-05-23 NOTE — PROGRESS NOTES
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   ertapenem at DC to better cover the uterine abscess and the bacteremia  Ertapenem reconsiled     Infection Control Recommendations   [place] Precautions        Antimicrobial Stewardship Recommendations   Simplification of therapy      History of Present Illness:   Initial history:  Evangelina King is a 44 y.o.-year-old female comes in with abdominal pain vaginal bleed and a discharge that smells quite a bit along with clots.  She was having difficulty urinating, and all that has been ongoing for about few weeks.  CT scan of the abdomen showed an air-fluid level within the uterus and a concern for a tumor invading the bladder with bilateral hydronephrosis.  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 
    Pharmacist Review and Automatic Dose Adjustment of Prophylactic Enoxaparin    Reviewed reason(s) for admission/hospital problem list    The reviewing pharmacist has made an adjustment to the ordered enoxaparin dose or converted to UFH per the approved [facility] protocol and table as identified below.        Evangelina King is a 44 y.o. female.     Recent Labs     05/21/24  0608 05/22/24  0622   CREATININE 1.6* 1.7*       Estimated Creatinine Clearance: 57 mL/min (A) (based on SCr of 1.7 mg/dL (H)).    Recent Labs     05/21/24  0608 05/21/24  1748 05/22/24  0622   HGB 6.9* 7.9* 8.4*   HCT 24.6* 28.3* 29.2*     --  299     Recent Labs     05/20/24  0907   INR 1.2       Height:   Ht Readings from Last 1 Encounters:   05/20/24 1.702 m (5' 7\")     Weight:  Wt Readings from Last 1 Encounters:   05/20/24 122.9 kg (271 lb)               Plan: Based upon the patient's weight and renal function    Ordered: Enoxaparin 40mg SUBQ Daily    Changed/converted to    New Order: Enoxaparin 30mg SUBQ BID      Thank you,  Shana Nesbitt [facility]  5/22/2024, 8:27 AM    
    Today's Date: 5/23/2024  Patient Name: Evangelina King  Date of admission: 5/18/2024  2:57 AM  Patient's age: 44 y.o., 1980  Admission Dx: Pelvic pain [R10.2]  Pelvic mass [R19.00]  Thickened endometrium [R93.89]    Reason for Consult: management recommendations   Requesting Physician: Alexsandra Johnson MD    Chief Complaint:  pelvic pain     Interval Changes:  Patient seen and examined.  Labs and vitals reviewed.  PICC placed for home antibiotic administration  Creatinine slightly improved to 1.4  No CBC to review today  Discharge planning in process  Overall patient feeling better today, some continued lower abdominal discomfort.  Very emotional.    History of Present Illness:    The patient is a 44 y.o. female who is admitted to the hospital on 5/18/2024 for management of pelvic pain.  She presented to an [facility] for evaluation of abnormal uterine bleeding for 2 years with associated foul-smelling, black vaginal discharge for 6 weeks and associated urinary frequency.  She was also noted to have fevers at this time.  Imaging at [facility] showed a thickened endometrium and hydronephrosis.  She was transferred to [place] for GYN evaluation for concern for malignancy with thickened endometrium and hydronephrosis.     She had an EUA, vaginoscopy, endometrial/cervical biopsies, cystoscopy, bladder biopsy on 5/20/2024  IR has been consulted for bilateral PERC neph tubes   case discussed with GYN-ONC , they suspect endometrial versus cervical cancer. Not a surgical candidate   Pt just back from [facility] neph, she is in a lot of pain, hard to get much information     Past Medical History:   has a past medical history of Cancer (HCC).    Past Surgical History:   has a past surgical history that includes Cholecystectomy; Dilation and curettage of uterus (05/20/2024); Cystoscopy w/ retrogrades (05/20/2024); Dilation and curettage of uterus (N/A, 5/20/2024); Cystoscopy (N/A, 5/20/2024); IR GUIDED 
    [facility]  Internal Medicine Teaching Residency Program  Inpatient Daily Progress Note  ______________________________________________________________________________    Patient: Evangelina King  YOB: 1980   MRN:[id number]    Acct: [id number]     Room: 0322/0322-02  Admit date: 5/18/2024  Today's date: 05/19/24  Number of days in the hospital: 1    SUBJECTIVE   Admitting Diagnosis: Pelvic pain  CC: Consult Reason: Tachycardia and fever   Pt examined at bedside. Chart & results reviewed.     Febrile overnight to 100.6 Fahrenheit, heart rate increased to 116, hemodynamically stable on room air.  No new complaints.    Review of Systems   Constitutional:  Positive for fever. Negative for chills.   HENT:  Negative for sore throat.    Respiratory:  Negative for shortness of breath.    Cardiovascular:  Negative for chest pain.   Gastrointestinal:  Positive for abdominal pain. Negative for abdominal distention.   Genitourinary:  Positive for difficulty urinating, pelvic pain, vaginal bleeding and vaginal discharge. Negative for dysuria and enuresis.   Musculoskeletal:  Negative for arthralgias.   Neurological:  Negative for headaches.   Psychiatric/Behavioral:  Negative for behavioral problems.        BRIEF HISTORY     The patient is a pleasant 44 y.o. female presents with a chief complaint of abdominal pain with vaginal bleeding and foul-smelling dark discharge with clots.  She has also been having urinary difficulty voiding.  The symptoms have been going on for a few weeks.  The patient was initially seen at an outside facility where she was noted to have fevers.  CT did show evidence of endometrial thickening and gas in the endometrial with suspicion for malignancy versus endometritis as well as evidence of hydronephrosis due to compression from the uterus.  The patient was admitted to the OB/GYN service and started on Rocephin with LR at 125.     The patient does 
    Zhou Covarrubias Santacroce, Khan, Mostafa, Maritza Santana Jr  Urology Progress Note     Subjective:   Afebrile hemodynamically stable  Plan for interventional radiology to place bilateral percutaneous nephrostomy tubes  Creatinine 1.6 from 1.7  No complaints    Patient Vitals for the past 24 hrs:   BP Temp Temp src Pulse Resp SpO2   05/21/24 0649 -- -- -- -- 16 --   05/21/24 0400 114/66 98.4 °F (36.9 °C) Oral 62 15 98 %   05/20/24 2343 125/66 97.7 °F (36.5 °C) Oral 74 16 99 %   05/20/24 2136 124/80 97.7 °F (36.5 °C) Oral 80 -- 98 %   05/20/24 1943 -- -- -- -- 16 --   05/20/24 1025 132/79 97.5 °F (36.4 °C) Oral 87 15 93 %   05/20/24 0930 136/70 -- -- (!) 101 15 98 %   05/20/24 0923 -- 97 °F (36.1 °C) Temporal -- -- --   05/20/24 0916 125/66 -- -- (!) 113 21 96 %   05/20/24 0900 114/69 -- -- (!) 102 15 93 %   05/20/24 0853 (!) 100/45 97.3 °F (36.3 °C) Temporal 95 18 92 %       Intake/Output Summary (Last 24 hours) at 5/21/2024 0753  Last data filed at 5/21/2024 0625  Gross per 24 hour   Intake 2942.9 ml   Output 2150 ml   Net 792.9 ml       Recent Labs     05/18/24  0908 05/19/24  0736 05/21/24  0608   WBC 16.9* 9.7 11.6*   HGB 8.1* 7.7* 6.9*   HCT 29.5* 27.3* 24.6*   MCV 71.4* 68.9* 69.3*    250 280     Recent Labs     05/19/24  0736 05/21/24  0608    135*   K 3.7 4.3    104   CO2 20 21   BUN 14 14   CREATININE 1.7* 1.6*       No results for input(s): \"COLORU\", \"PHUR\", \"LABCAST\", \"WBCUA\", \"RBCUA\", \"MUCUS\", \"TRICHOMONAS\", \"YEAST\", \"BACTERIA\", \"CLARITYU\", \"SPECGRAV\", \"LEUKOCYTESUR\", \"UROBILINOGEN\", \"BILIRUBINUR\", \"BLOODU\" in the last 72 hours.    Invalid input(s): \"NITRATE\", \"GLUCOSEUKETONESUAMORPHOUS\"    Additional Lab/culture results:    Physical Exam:   Acute distress  Regular rate and rhythm  Symmetric chest rise normal work of breathing  Soft nontender nondistended  Voiding    Interval Imaging Findings:    Impression:    44-year-old female  Concern of cervical cancer with invasion into the 
    Zhou Covarrubias, Familia, Michelle, Mervat, Maritza Santana Jr  Urology Progress Note     Subjective:  No acute events overnight, afebrile, vital signs stable  Pain level: well controlled  Denies fever, chills, nausea, vomiting, chest pain, shortness of breath    UOP: 825  WBC: 9.7  Hb: 7.7  Cr: 1.7    Patient Vitals for the past 24 hrs:   BP Temp Temp src Pulse Resp SpO2 Height Weight   05/20/24 0646 121/66 -- -- 99 16 97 % -- --   05/20/24 0630 119/68 -- -- 100 16 97 % -- --   05/20/24 0625 (!) 150/74 98.2 °F (36.8 °C) Temporal (!) 108 18 99 % 1.702 m (5' 7\") 122.9 kg (271 lb)   05/20/24 0026 -- -- -- -- 16 -- -- --   05/20/24 0016 -- -- -- -- 16 -- -- --   05/20/24 0012 119/63 98.2 °F (36.8 °C) Oral 100 16 97 % -- --   05/19/24 2346 -- -- -- -- 16 -- -- --   05/19/24 1958 (!) 101/56 98.5 °F (36.9 °C) Oral 78 16 95 % -- --   05/19/24 1719 -- -- -- -- 16 -- -- --   05/19/24 1649 -- -- -- -- 16 -- -- --   05/19/24 1611 120/62 99.4 °F (37.4 °C) Oral 95 18 98 % -- --   05/19/24 1418 -- -- -- -- 16 -- -- --   05/19/24 1348 -- -- -- -- 16 -- -- --   05/19/24 1202 133/66 99.3 °F (37.4 °C) Oral (!) 101 16 98 % -- --   05/19/24 1121 -- -- -- -- 16 -- -- --   05/19/24 1051 -- -- -- -- 16 -- -- --   05/19/24 0840 123/71 98.6 °F (37 °C) Oral 98 18 97 % -- --       Intake/Output Summary (Last 24 hours) at 5/20/2024 0717  Last data filed at 5/19/2024 1710  Gross per 24 hour   Intake --   Output 825 ml   Net -825 ml       Recent Labs     05/18/24  0614 05/18/24  0908 05/19/24  0736   WBC 18.0* 16.9* 9.7   HGB 7.7* 8.1* 7.7*   HCT 27.6* 29.5* 27.3*   MCV 70.4* 71.4* 68.9*    288 250     Recent Labs     05/17/24  2102 05/18/24  0614 05/19/24  0736    134* 137   K 3.7 3.5* 3.7   CL 99 103 106   CO2 23 19* 20   BUN 12 12 14   CREATININE 1.3* 1.5* 1.7*       Recent Labs     05/17/24  1815   COLORU Yellow   PHUR 5.5   WBCUA 2 TO 5   RBCUA 5 TO 10   MUCUS 1+*   BACTERIA None   LEUKOCYTESUR NEGATIVE   UROBILINOGEN Normal 
    Zhou Covarrubias, Familia, Michelle, Mervat, Max, Maritza Jr  Urology Progress Note     Subjective:   Afebrile hemodynamically stable  Underwent placement of bilateral percutaneous nephrostomy tubes with interventional radiology   Creatinine 1.7 from 1.6  No complaints    Patient Vitals for the past 24 hrs:   BP Temp Temp src Pulse Resp SpO2   05/22/24 0756 -- -- -- 96 -- --   05/22/24 0745 139/80 -- -- -- 16 94 %   05/22/24 0504 -- -- -- -- 16 --   05/22/24 0434 -- -- -- -- 16 --   05/22/24 0103 -- -- -- -- 16 --   05/22/24 0033 -- -- -- -- 18 --   05/21/24 2351 92/73 98.1 °F (36.7 °C) Oral 96 -- 92 %   05/21/24 2312 -- -- -- -- 18 --   05/21/24 2242 -- -- -- -- 18 --   05/21/24 2105 -- -- -- -- 18 --   05/21/24 2035 -- -- -- -- 20 --   05/21/24 2018 116/64 98.1 °F (36.7 °C) Oral 96 -- 96 %   05/21/24 1726 -- -- -- -- 16 --   05/21/24 1605 (!) 155/103 -- -- 88 16 97 %   05/21/24 1600 (!) 160/100 -- -- 86 15 98 %   05/21/24 1555 (!) 160/100 -- -- 83 15 98 %   05/21/24 1550 (!) 161/98 -- -- 82 19 98 %   05/21/24 1545 (!) 164/92 -- -- 88 19 97 %   05/21/24 1540 (!) 161/95 -- -- 83 18 98 %   05/21/24 1535 (!) 151/93 -- -- 89 16 98 %   05/21/24 1530 (!) 153/88 -- -- 87 29 97 %   05/21/24 1525 (!) 160/82 -- -- 81 17 97 %   05/21/24 1520 (!) 161/96 -- -- 90 16 97 %   05/21/24 1515 (!) 152/94 -- -- 97 22 92 %   05/21/24 1330 (!) 143/87 98.1 °F (36.7 °C) Oral 80 18 98 %   05/21/24 1201 (!) 141/87 98.3 °F (36.8 °C) Oral -- 18 97 %   05/21/24 1156 (!) 134/90 97.7 °F (36.5 °C) Oral 79 18 94 %   05/21/24 1146 139/86 98.9 °F (37.2 °C) -- 80 18 96 %   05/21/24 1025 -- -- -- -- 18 --         Intake/Output Summary (Last 24 hours) at 5/22/2024 0830  Last data filed at 5/22/2024 0626  Gross per 24 hour   Intake 325 ml   Output 3110 ml   Net -2785 ml         Recent Labs     05/21/24  0608 05/21/24  1748 05/22/24  0622   WBC 11.6*  --  12.6*   HGB 6.9* 7.9* 8.4*   HCT 24.6* 28.3* 29.2*   MCV 69.3*  --  69.4*     --  299 
Attending Physician Statement  I have discussed the care of Evangelina King, including pertinent history and exam findings with the resident. I have reviewed the key elements of all parts of the encounter with the resident. I have seen and examined the patient with the resident.  I agree with the assessment and plan and status of the problem list as documented.    I saw the patient during rounds this morning.  Please see full note by medicine resident.  I have reviewed the urology note, GYN note operative note seen by both services.  I have reviewed the results of the initial CT scan of the abdomen which was done earlier.  When I saw the patient patient had just returned from the OR was complaining of mild lower abdominal discomfort abdominal soft and nonrigid.  Patient had cystoscopy and bladder biopsy done urology service was not able to place stent and plan noted by urology for interventional radiology to place nephrostomy tubes.  Per GYN suspected cervical cancer she had exam under anesthesia vaginoscopy biopsy of endometrium and cervical mass done by GYN.  She is currently on Zosyn will continue she had 1 out of further blood culture positive for gram-positive rods will follow-up culture.  She is afebrile WBC count improved gradually to 9.7 hemoglobin is 7.7 today.  Her creatinine is 1.7 today likely secondary to bilateral hydronephrosis will follow-up urine output renal function she is going to get bilateral nephrostomy and will follow-up renal function tomorrow.    Discussed with family at bedside and questions answered.    Please note that this chart was generated using voice recognition Dragon dictation software. Although every effort was made to ensure the accuracy of this automated transcription, some errors in transcription may have occurred.     Russel Castorena MD  5/20/2024 10:34 AM   
CLINICAL PHARMACY NOTE: MEDS TO BEDS    Total # of Prescriptions Filled: 8   The following medications were delivered to the patient:  TRAMADOL 50MG   FLEXERIL 10MG   OXY 5MG   SENNA 8.6MG   GABAPENTIN 300MG   HYDROXYZINE 25MG   TYLENOL 500MG   ZOLOFT 25MG     Additional Documentation:   
Call placed to phlebotomy for outstanding type/screen.     1016- Second call placed to phlebotomy. Unable to reach anyone. Calls forwarded to in patient lab after several rings.   
Gyn Oncology Note    Patient seen and fully evaluated by me today  Chart notes records labs imaging and history revd by me in detail today  I spent 45 minutes in the hospital today managing this case  Plan of care revd with patient and family  She tells me that I have answered all questions to her satisfaction  PET scan ordered  Med onc consultation ordered  Rad onc consultation ordered  PCN tubes today for her hydronephrosis, obstruction from cancer  Possible dc home later this week    CHRIS Agrawal MD  
Gynecology Oncology Progress Note    Date: 5/19/2024  Time: 4:41 AM    Evangelina King is a 44 y.o. female HD# 2    Patient was seen and examined. She complained of improve urinary symptoms. States she is able to void more frequently. Reports her pain is controlled with Tylenol, Flexeril and Gabapentin. Discussed plan of care with co-procedure with urology today in the OR for Hysteroscopy, D&C, PAP smear, possible cervical/vaginal biopsies. R/B/A to procedure discussed. Patient expressed understanding and is amenable to procedure for tissue diagnosis.      Patient was tolerating oral intake previously, however has been NPO since MN. She is urinating much better than previously .  She reports continued black vaginal discharge, however, reported to be minimal. She is  ambulating without difficulty.  She is  passing flatus. She denies Fever/Chills, Chest Pain, SOB, N/V, Calf Pain    Vitals:  Vitals:    05/18/24 1353 05/18/24 2037 05/18/24 2247 05/19/24 0400   BP:  (!) 140/85 137/60 121/67   Pulse: (!) 116 (!) 114 (!) 107 96   Resp: 23 22 20 16   Temp: (!) 101.3 °F (38.5 °C) (!) 100.6 °F (38.1 °C) 99.4 °F (37.4 °C) 97.6 °F (36.4 °C)   TempSrc: Oral Oral Oral Oral   SpO2:  97% 96% 96%   =  Intake/Output:   Last Shift: No intake/output data recorded.  Current Shift: I/O this shift:  In: 4016.7 [I.V.:816.4; IV Piggyback:3200.3]  Out: -     Physical Exam:  General:  no apparent distress, alert, and cooperative  Neurologic:  alert, oriented, normal speech, no focal findings or movement disorder noted  Lungs:  No increased work of breathing  Heart:  regular rate and rhythm    Abdomen: Abdomen soft, non-tender, obese   Extremities:  no calf tenderness, non edematous    Lab:  Complete Blood Count:   Recent Labs     05/17/24  2102 05/18/24  0614 05/18/24  0908   WBC 12.4* 18.0* 16.9*   HGB 9.0* 7.7* 8.1*   HCT 30.3* 27.6* 29.5*    317 288        PT/INR:    No results found for: \"PROTIME\", \"INR\"  PTT:    No results found 
Gynecology Progress Note    Date: 5/20/2024  Time: 7:13 AM    Evangelina King is a 44 y.o. female HD# 3    Patient was seen and examined. She is tearful about suspected malignancy. Plan today is hysteroscopy D&C, pap smear, possible cervical/vaginal biopsies, co procedure with urology for cystoscopy and bilateral ureteral stents today. She complained of continued. Pain is  controlled with Tylenol/Gabapentin/Tramadol.  Patient has been NPO since midnight. She is urinating well .  She reports vaginal bleeding/dark colored vaginal discharge. She is  ambulating without difficulty.  She is  passing flatus. She denies Fever/Chills, Chest Pain, SOB, N/V, Calf Pain    Vitals:  Vitals:    05/20/24 0026 05/20/24 0625 05/20/24 0630 05/20/24 0646   BP:  (!) 150/74 119/68 121/66   Pulse:  (!) 108 100 99   Resp: 16 18 16 16   Temp:  98.2 °F (36.8 °C)     TempSrc:  Temporal     SpO2:  99% 97% 97%   Weight:  122.9 kg (271 lb)     Height:  1.702 m (5' 7\")         Intake/Output:   Last Shift: I/O last 3 completed shifts:  In: 4016.7 [I.V.:816.4; IV Piggyback:3200.3]  Out: 1275 [Urine:1275]  Current Shift: No intake/output data recorded.    Physical Exam:  General:  no apparent distress, alert, and cooperative  Neurologic:  alert, oriented, normal speech, no focal findings or movement disorder noted  Lungs:  No increased work of breathing, good air exchange, clear to auscultation bilaterally, no crackles or wheezing  Heart:  regular rate and rhythm    Abdomen: Abdomen soft, non-tender. BS normal.   Extremities:  no calf tenderness, non edematous    Lab:  Complete Blood Count:   Recent Labs     05/18/24  0614 05/18/24  0908 05/19/24  0736   WBC 18.0* 16.9* 9.7   HGB 7.7* 8.1* 7.7*   HCT 27.6* 29.5* 27.3*    288 250        PT/INR:    No results found for: \"PROTIME\", \"INR\"  PTT:    No results found for: \"APTT\"    Comprehensive Metabolic Profile:   Recent Labs     05/17/24  2102 05/18/24  0614 05/19/24  0736    134* 137   K 3.7 
Gynecology Progress Note    Date: 5/21/2024  Time: 9:16 AM    Evangelina King 44 y.o. female POD #1 s/p EUA, vaginoscopy, endometrial/cervical biopsies, cystoscopy, bladder biopsy 5/20/24 with concern for cervical cancer    Patient seen and examined. She denies any complaints. Reviewed Hgb 6.9 this morning and patient amenable to 1U pRBC. Pain is controlled with oral meds.  Patient is NPO for IR b/l perc neph tube placement today. She is urinating well via lamas catheter.  She denies any vaginal bleeding. She is  ambulating without difficulty.  She is  passing flatus. She denies Fever/Chills, Chest Pain, SOB, N/V, Calf Pain    Vitals:  Vitals:    05/20/24 2343 05/21/24 0400 05/21/24 0649 05/21/24 0814   BP: 125/66 114/66  115/74   Pulse: 74 62  84   Resp: 16 15 16 16   Temp: 97.7 °F (36.5 °C) 98.4 °F (36.9 °C)  98.1 °F (36.7 °C)   TempSrc: Oral Oral  Oral   SpO2: 99% 98%  97%   Weight:       Height:           Intake/Output:   Last Shift: I/O last 3 completed shifts:  In: 2942.9 [P.O.:900; I.V.:1805.3; IV Piggyback:237.6]  Out: 2150 [Urine:2150]  Current Shift: No intake/output data recorded.  400 mL out in last 6 hrs ~ 57mL/hr      Physical Exam:  General:  no apparent distress, alert, and cooperative  Neurologic:  alert, oriented, normal speech, no focal findings or movement disorder noted  Lungs:  No increased work of breathing, good air exchange, no coughing or wheezing  Heart:  regular rate and rhythm    Abdomen: soft, non-distended, appropriate tenderness, normal bowel sounds  Extremities:  no calf tenderness, non edematous, SCDs in place    Lab:  Recent Results (from the past 12 hour(s))   CBC with Auto Differential    Collection Time: 05/21/24  6:08 AM   Result Value Ref Range    WBC 11.6 (H) 3.5 - 11.3 k/uL    RBC 3.55 (L) 3.95 - 5.11 m/uL    Hemoglobin 6.9 (LL) 11.9 - 15.1 g/dL    Hematocrit 24.6 (L) 36.3 - 47.1 %    MCV 69.3 (L) 82.6 - 102.9 fL    MCH 19.4 (L) 25.2 - 33.5 pg    MCHC 28.0 (L) 28.4 - 34.8 g/dL 
Obgyn Attending    Patient seen at bedside with her  and her best friends. Reviewed MRI results and highly suspicious imaging for malignancy with invasion into the bladder. Support offered. Will plan to treat with zosyn for concern for sepsis. Plan for H-Scope D+C tomorrow for tissue diagnosis.     Continue serial lactates, IV fluids and close monitoring. Discussed at length diagnosis and plan to obtain more information with cultures, pap test and biopsy in the OR tomorrow.     Giving patient some moments to adjust to this devastating news and will return for pelvic exam and further address any questions or concerns.     Vitals:    05/18/24 0430 05/18/24 0730 05/18/24 1330 05/18/24 1353   BP:  (!) 131/56 (!) 148/72    Pulse: (!) 111 99 (!) 109 (!) 116   Resp: 22 18 25 23   Temp:  98.6 °F (37 °C)  (!) 101.3 °F (38.5 °C)   TempSrc:  Oral  Oral   SpO2:  96%       MRI Pelvis 5/18/24    COMPARISON:  05/18/2024, 05/17/2024 the     HISTORY:  ORDERING SYSTEM PROVIDED HISTORY: concern for pelvic mass/endometrial cancer  TECHNOLOGIST PROVIDED HISTORY:  concern for pelvic mass/endometrial cancer  Reason for Exam: concern for pelvic mass/endometrial cancer     EMB was planned today.     FINDINGS:  Uterus: Uterus is anteverted measuring 14.9 x 9.5 x 13.2 cm.  There is a  mildly T2 hyperintense, hypoenhancing endometrial mass measuring at least  11.1 cm with invasion of greater than 50% of myometrium, mucosa of the latter  base on series 903, image 58, left parametrium and cul-de-sac on series 7  images 18 and series 8, image 32.  Cervical stromal invasion is also present.  Gas in the endometrial cavity.  Widening of the junctional zone to  approximately 3 cm.     Adnexa: Ovaries are heterogeneous without measurable mass lesion.     Peritoneum/retroperitoneum: Numerous abnormal iliac,  and mesorectal  nodes.  An example left up  node measures 5.0 x 2.5 cm on series 8,  image 35.  An example right external 
Obstetric/Gynecology Resident Interval Note    Patient seen and examined with Dr. Fields. Discussed MRI results suggesting advanced GYN cancer with invasion into cervix, bladder and pelvic lymph nodes. Gas again noted within the endometrium.     Patient tearful with information. Extensive support given. Discussed involvement and transfer of primary care to GYN oncology team for further management and work up of advance cancer.     Pelvic exam performed after patient was able to process information about abnormal imaging with chaperone at bedside.    Vitals:    05/18/24 1330 05/18/24 1353 05/18/24 2037 05/18/24 2247   BP: (!) 148/72  (!) 140/85 137/60   Pulse: (!) 109 (!) 116 (!) 114 (!) 107   Resp: 25 23 22 20   Temp:  (!) 101.3 °F (38.5 °C) (!) 100.6 °F (38.1 °C) 99.4 °F (37.4 °C)   TempSrc:  Oral Oral Oral   SpO2:   97% 96%     Physical exam  General appearance:  no apparent distress, alert, and cooperative  HEENT: head atraumatic, normocephalic, moist mucous membranes, trachea midline  Neurologic:  alert, oriented, normal speech, no focal findings or movement disorder noted  Lungs:  No increased work of breathing noted  Heart:  regular rate and rhythm  Abdomen:  soft, obese and non-tender, without peritoneal findings  Extremities:  no calf tenderness, non edematous   Musculoskeletal: Gross strength equal and intact throughout, no gross abnormalities, range of motion normal in hips, knees, shoulders and spine  Psychiatric: Mood appropriate, normal affect   Rectal Exam: not indicated  Pelvic exam: uterine fundus not palpated due to BMI, cervix appears very anterior on speculum exam which was limited by patient discomfort. Bimanual exam performed by attending physician and significant for firm fixed mass superiorly with involvement of the bladder, anterior and firm cervix.     Discussed with GYN Onc who recommended switching Abx regimen to Unasyn for more broad spectrum antibiotic coverage and consulting urology 
[facility] [place]  PROGRESS NOTE    Shift date: 5/22/24  Shift day: Wednesday   Shift # 2    Room # 0322/0322-02   Name: Evangelina King                Sikh: Anabaptism   Place of Mosque: unknown    Referral: Routine Visit   05/22/24 1932   Encounter Summary   Encounter Overview/Reason Initial Encounter;Follow-up   Service Provided For Patient;Friend   Referral/Consult From Nurse   Support System Spouse;Children;Friends/neighbors   Last Encounter  05/22/24   Complexity of Encounter High   Begin Time 1850   End Time  1915   Total Time Calculated 25 min   Assessment/Intervention/Outcome   Assessment Calm;Fearful;Shock;Tearful   Intervention Active listening;Discussed illness injury and it’s impact;Explored/Affirmed feelings, thoughts, concerns;Prayer (assurance of)/Kelly;Sustaining Presence/Ministry of presence   Outcome Comfort;Coping;Engaged in conversation;Expressed feelings, needs, and concerns;Expressed Gratitude         Admit Date & Time: 5/18/2024  2:57 AM    Assessment:  Evangelina King is a 44 y.o. female in the hospital because of Kidney pain.. Upon entering the room writer observes patient in bed, friend on laptop next to bed.      Intervention:  Writer introduced self and title as  Ernie. Writer offered space for Evangelina  to express feelings, needs, and concerns and provided a ministry presence. Evangelina came to the hospital with what she thought was a kidney stone. She has since been advised that she has aggressive, but treatable, cancer. Evangelina shared her thoughts and feelings and requested prayer.    Outcome:  This writer provided a ministry of a sustaining presence and prayer, which was gratefully received by the patient.    Plan:  Chaplains will remain available to offer spiritual and emotional support as needed.      Electronically signed by Chaplain Charmaine, on 5/22/2024 at 7:34 PM.  [facility]  [phone]      
Patient resting in bed.  Complains of chills and is febrile. Tachy 110.  Tylenol given. Ob/gyn resident and medicine resident informed of lactic levels also.  
Report given to ANCA Crockett  
Resident notified of labs thru perfect serve.  Potassium replacement ordered.  
      Assessment/Plan:  Evangelina King 44 y.o. female POD#2 s/p EUA, vaginoscopy, endometrial/cervical biopsies, cystoscopy, bladder biopsy 5/20/24 with concern for cervical cancer; POD#1 s/p bilateral percutaneous nephrostomy tubes   - Doing well, vitals stable   - Discontinue lamas catheter   - Continue IVF   - Encourage ambulation and use of incentive spirometer   - Pain control: Tylenol/Gabapentin/Flexeril/Rae prn; doses increased and lidocaine patch added for additional pain control   - Labs: CBC, CMP qd   - DVT Proph: Lovenox 30 mg BID; SCDs in place   - Abx: Zosyn q8h   - Diet: regular   - Path: pending   - Continue post-op care, please page with any questions   - Hematology oncology consulted and following   - Urology consulted and following   - ID consulted for newly positive blood culture   - Will reassess for possible discharge today vs tomorrow once patient pain improved and antibiotics on discharge are decided     Septicemia   - 5/18 Gram positive rods, Clostridium ramosum   - ID consulted. Awaiting recommendations   - Patient currently receiving Zosyn q8h   - Patient afebrile during admission   - WBC stable 12.6 yesterday    Chronic anemia              - Hgb 9.0>7.7>8.1>7.7>6.9>1U>7.9              - Normal B12/Folate              - Ferritin within normal limits at 50, suggesting low likelihood of iron deficiency              - Suspect possible anemia of chronic disease in the setting of malignancy               - Vaginal bleeding stable at this time, mild per patient report              - Continue to monitor closely. Patient asymptomatic at this time     Abnormal creatinine              - Baseline creatinine from 2019 was 0.63              - Trend since admission was 1.3>1.5>1.7>1.6>1.7              - Likely secondary to obstructive uropathy with pelvic mass              - S/p bilateral perc neph tubes in IR yesterday     BMI 42      Mercy Villatoro DO  Ob/Gyn Resident    5/22/2024, 7:43 AM   
99 mg/dL    BUN 8 6 - 20 mg/dL    Creatinine 1.4 (H) 0.50 - 0.90 mg/dL    Est, Glom Filt Rate 49 (L) >60 mL/min/1.73m2    Calcium 8.7 8.6 - 10.4 mg/dL       Assessment/Plan:  Evangelina King 44 y.o. female POD#3 s/p EUA, vaginoscopy, endometrial/cervical biopsies, cystoscopy, bladder biopsy 5/20/24 with concern for cervical cancer; POD#2 s/p bilateral percutaneous nephrostomy tubes   - Doing well, vitals stable   - S/p lamas catheter   - Continue IVF   - Encourage ambulation and use of incentive spirometer   - Pain control: Tylenol/Gabapentin/Flexeril/Rae/Lidocaine patches prn   - Labs: CBC, BMP qd   - DVT Proph: Lovenox 30 mg BID; SCDs encouraged   - Abx: Zosyn q8h. Plan for Ertapenem x 14d (until 6/1)    - Diet: regular   - Path: squamous cell carcinoma from cervical and bladder biopsies   - Continue post-op care, please page with any questions   - Hematology oncology consulted and following   - Urology consulted and following   - ID consulted for positive blood culture   - PICC line place yesterday   - Awaiting home health care coordination for Ertapenem     Septicemia   - 5/18 Gram positive rods, Clostridium ramosum   - ID consulted and recommend 14d Ertapenem on discharge. PICC line placed 5/22   - Patient currently receiving Zosyn q8h   - Patient afebrile during admission   - WBC stable 12.6 yesterday    Chronic anemia              - Hgb 9.0>7.7>8.1>7.7>6.9>1U>7.9              - Normal B12/Folate              - Ferritin within normal limits at 50, suggesting low likelihood of iron deficiency              - Suspect possible anemia of chronic disease in the setting of malignancy               - Vaginal bleeding stable at this time, mild per patient report              - Continue to monitor closely. Patient asymptomatic at this time     Abnormal creatinine              - Baseline creatinine from 2019 was 0.63              - Trend since admission was 1.3>1.5>1.7>1.6>1.7>1.5>1.4              - Likely secondary to 
  Abdominal:      General: There is no distension.      Palpations: Abdomen is soft.      Tenderness: There is abdominal tenderness. There is right CVA tenderness and left CVA tenderness. There is no guarding.   Musculoskeletal:      Right lower leg: No edema.      Left lower leg: No edema.   Skin:     General: Skin is warm and dry.      Capillary Refill: Capillary refill takes less than 2 seconds.      Coloration: Skin is not jaundiced.   Neurological:      General: No focal deficit present.      Mental Status: She is alert and oriented to person, place, and time.           Medications:  Scheduled Medications:    acetaminophen  1,000 mg Oral 4 times per day    gabapentin  600 mg Oral TID    lidocaine  2 patch TransDERmal Daily    enoxaparin  30 mg SubCUTAneous BID    senna  2 tablet Oral BID    potassium chloride  40 mEq Oral Daily    Or    potassium alternative oral replacement  40 mEq Oral Daily    Or    potassium chloride  10 mEq IntraVENous Daily    sodium chloride flush  5-40 mL IntraVENous 2 times per day    piperacillin-tazobactam  3,375 mg IntraVENous Q8H     Continuous Infusions:    sodium chloride      sodium chloride Stopped (05/20/24 0559)    sodium chloride 20 mL (05/21/24 1022)     PRN MedicationsoxyCODONE, 10 mg, Q6H PRN  oxyCODONE, 5 mg, Q6H PRN  traMADol, 50 mg, Q6H PRN  sodium chloride, , PRN  benzocaine-menthol, 1 lozenge, Q2H PRN  HYDROmorphone, 1 mg, Q4H PRN  LORazepam, 0.5 mg, Q4H PRN  prochlorperazine, 10 mg, Q6H PRN  ondansetron, 4 mg, Q8H PRN   Or  ondansetron, 4 mg, Q6H PRN  cyclobenzaprine, 10 mg, TID PRN  sodium chloride flush, 5-40 mL, PRN  sodium chloride, , PRN        Diagnostic Labs:  CBC:   Recent Labs     05/21/24  0608 05/21/24  1748 05/22/24  0622   WBC 11.6*  --  12.6*   RBC 3.55*  --  4.21   HGB 6.9* 7.9* 8.4*   HCT 24.6* 28.3* 29.2*   MCV 69.3*  --  69.4*   RDW 19.1*  --  19.4*     --  299       BMP:   Recent Labs     05/21/24  0608 05/22/24  0622   * 138   K 4.3 
Mercy Villatoro DO   1 lozenge at 05/21/24 1020    HYDROmorphone (DILAUDID) injection 1 mg  1 mg IntraVENous Q4H PRN Freya Lee MD   1 mg at 05/21/24 2242    LORazepam (ATIVAN) tablet 0.5 mg  0.5 mg Oral Q4H PRN Mercy Villatoro, DO   0.5 mg at 05/22/24 0034    senna (SENOKOT) tablet 17.2 mg  2 tablet Oral BID Mercy Villatoro, DO   17.2 mg at 05/22/24 0824    prochlorperazine (COMPAZINE) injection 10 mg  10 mg IntraVENous Q6H PRN Mercy Villatoro DO        0.9 % sodium chloride infusion   IntraVENous Continuous Mercy Villatoro DO   Stopped at 05/20/24 0559    ondansetron (ZOFRAN-ODT) disintegrating tablet 4 mg  4 mg Oral Q8H PRN Mercy Villatoro,    4 mg at 05/22/24 0824    Or    ondansetron (ZOFRAN) injection 4 mg  4 mg IntraVENous Q6H PRN Mercy Villatoro, DO   4 mg at 05/21/24 1336    cyclobenzaprine (FLEXERIL) tablet 10 mg  10 mg Oral TID PRN Mercy Villatoro DO   10 mg at 05/22/24 0337    potassium chloride (KLOR-CON M) extended release tablet 40 mEq  40 mEq Oral Daily Mercy Villatoro DO   40 mEq at 05/22/24 0826    Or    potassium bicarb-citric acid (EFFER-K) effervescent tablet 40 mEq  40 mEq Oral Daily Mecry Villatoro DO        Or    potassium chloride 10 mEq/100 mL IVPB (Peripheral Line)  10 mEq IntraVENous Daily Mercy Villatoro DO        sodium chloride flush 0.9 % injection 5-40 mL  5-40 mL IntraVENous 2 times per day Mercy Villatoro DO   10 mL at 05/20/24 1953    sodium chloride flush 0.9 % injection 5-40 mL  5-40 mL IntraVENous PRN Mercy Villatoro,    10 mL at 05/21/24 0619    0.9 % sodium chloride infusion   IntraVENous PRN Mercy Villatoro DO 20 mL/hr at 05/21/24 1022 20 mL at 05/21/24 1022    piperacillin-tazobactam (ZOSYN) 3,375 mg in sodium chloride 0.9 % 50 mL IVPB (mini-bag)  3,375 mg IntraVENous Q8H Mercy Villatoro DO 12.5 mL/hr at 05/22/24 1031 3,375 mg at 05/22/24 1031       Allergies:  Patient has no known allergies.    Social History:   reports that she has been smoking cigarettes. She has never used 
adenopathy as well as retroperitoneal adenopathy, highly suspicious for metastatic disease. Critical results were called by Dr. Lois Vinson MD to Nell Greer CNP on 5/17/2024 at 20:24.     US NON OB TRANSVAGINAL    Result Date: 5/18/2024  Limited evaluation of the pelvic organs due to artifact. Abnormal air and fluid in the endometrial canal without evidence of Doppler flow.  This is not well evaluated due to the artifact.  Recommend gynecologic consultation.  MRI of the pelvis with and without contrast may be helpful for further imaging evaluation. Nonvisualization of the right ovary. Unremarkable left ovary.     US PELVIS COMPLETE    Result Date: 5/18/2024  Limited evaluation of the pelvic organs due to artifact. Abnormal air and fluid in the endometrial canal without evidence of Doppler flow.  This is not well evaluated due to the artifact.  Recommend gynecologic consultation.  MRI of the pelvis with and without contrast may be helpful for further imaging evaluation. Nonvisualization of the right ovary. Unremarkable left ovary.       ASSESSMENT & PLAN     ASSESSMENT / PLAN:     Principal Problem:    Pelvic pain  Active Problems:    Pelvic mass    Thickened endometrium    S/p EUA, vaginoscopy, endometrial/cervical biopsies, cystoscopy, bladder biopsy 5/20/24    Suspected stage 4 cervical cancer    Abnormal uterine bleeding    Sepsis with acute renal failure without septic shock (HCC)    Lesion of cervix    Lymphadenopathy    Enlarged uterus    Hydroureter, right    Hydroureter on left    Uterine mass  Resolved Problems:    * No resolved hospital problems. *    Thank you for allowing us to see Evangelina King in consultation for fever and tachycardia     # Endometrial thickening, possible gynecologic malignancy  -OB/GYN primary, plan for endometrial sampling per Gyn Onc  -MRI showing thickened endometrium, mesenteric nodes, and bladder invasion  - s/p vaginoscopy, biopsy.  - hgb is 6.9 today. 1U of blood is

## 2024-05-23 NOTE — TELEPHONE ENCOUNTER
Name: Evangelina King  : 1980  MRN: [id number]    Oncology Navigation- Initial Note:    Intake-  Contact Type:  inpatient referral    Continuum of Care: Diagnosis/Active Treatment    Smoking hx: daily smoker    Notes: writer receives hand off from ZEHRA Skaggs RN navigator.  Reviewing chart. Will follow and engage with patient once discharged from hospital.  Care team updated.    Electronically signed by Lore Estrada RN on 2024 at 8:33 AM

## 2024-05-23 NOTE — CARE COORDINATION
Patient is planning on returning home .  Neph tubes are to be placed today.  Patient will need home care upon discharge  List provided to patient and family for freedom of choice  
Transitional Planning  [facility] is not able to accept, no reason given, will need more choices.    
Transitional Planning  Spoke to patient's , Sudheer, choices for HHC is [facility] and [facility].  Referrals placed for both.  Per ID pt will need IV ATBs until 6/1.    [facility] considering, [facility].   
Transitional planning: Met w/ patient r/t discharge plan. Patient needs home care and was given a list of home care agencies. Both choices unable to accept. Encouraged to make 3 more choices. Explained that CM will send referrals and will make patient aware when patient is accepted for services. Made aware that acceptance based on insurance and if agency able to meet patient needs. 1) [facility]  2) [facility] 3) [facility]. Referrals sent to all 3 agencies. Also sent referral to [facility] for IV ATB.  is teachable.    11:45 AM Call back from Tashia at [facility]. Verified benefits and has $2200 out of pocket and 70% coverage. Max out of pocket $4750 and then pays 100%. Needs to have 1st dose prior to discharge. Patient made aware of benefits/OOP. Made patient aware that also have the option of going to infusion center at [facility] vs [facility]. Patient angry and overwhelmed and having difficulty grasping how HHC and IV ATB therapy work together. Angry because wants to leave now and was told needed to get 1st dose of Invanz here prior to discharge per policy d/t potential for allergy. Patient asked CM to leave and give her at least an hour to process information.    12:30 PM Yara from [facility] called and can accept patient. [facility] here to do teaching w/ patient and  and will make patient aware that [facility] has accepted.    1:15 PM  NESHA Watson RN talked w/ pharmacist and 1st Invanz dose can be given at 5PM today. P/S Dr Mckeon that needs to sign Invanz script.    
at discharge:      Financial    Payor: [employer] / Plan: [employer] [address] / Product Type: *No Product type* /     Does insurance require precert for SNF: Yes    Potential assistance Purchasing Medications: No  Meds-to-Beds request:        [id number] - [place], [place] - [address] - P [phone] - F [phone]  [address]  [place] [address]  Phone: [phone] Fax: [phone]      Notes:    Factors facilitating achievement of predicted outcomes: Family support    Barriers to discharge: Medical complications    Additional Case Management Notes: home with     The Plan for Transition of Care is related to the following treatment goals of Pelvic pain [R10.2]  Pelvic mass [R19.00]    IF APPLICABLE: The Patient and/or patient representative Evangelina and her family were provided with a choice of provider and agrees with the discharge plan. Freedom of choice list with basic dialogue that supports the patient's individualized plan of care/goals and shares the quality data associated with the providers was provided to:     Patient Representative Name:       The Patient and/or Patient Representative Agree with the Discharge Plan?      PAULETTE LOVE RN  Case Management Department  Ph: [phone] Fax:

## 2024-05-23 NOTE — TELEPHONE ENCOUNTER
Name: Evangelina King  : 1980  MRN: [id number]    Oncology Navigation- Initial Note:    Intake-  Contact Type: Telephone    Notes: Upon review of Dr. Hare's progress note, pt to be scheduled for f/u @ [facility].  Lore, [facility] nurse navigator, updated & oncology navigation deferred to Lore.    Electronically signed by Jessa Skaggs RN on 2024 at 8:15 AM

## 2024-05-23 NOTE — DISCHARGE INSTR - COC
5/23/2024 1448  Last data filed at 5/23/2024 1429  Gross per 24 hour   Intake --   Output 1900 ml   Net -1900 ml     I/O last 3 completed shifts:  In: -   Out: 3675 [Urine:3675]    Safety Concerns:     None    Impairments/Disabilities:      None    Nutrition Therapy:  Current Nutrition Therapy:   - Oral Diet:  General    Routes of Feeding: Oral  Liquids: Thin Liquids  Daily Fluid Restriction: no  Last Modified Barium Swallow with Video (Video Swallowing Test): not done    Treatments at the Time of Hospital Discharge:   Respiratory Treatments:     Oxygen Therapy:  is not on home oxygen therapy.  Ventilator:    - No ventilator support    Rehab Therapies: Physical Therapy and Speech/Language Therapy  Weight Bearing Status/Restrictions: No weight bearing restrictions  Other Medical Equipment (for information only, NOT a DME order):  bath bench  Other Treatments:     Patient's personal belongings (please select all that are sent with patient):  [place]    RN SIGNATURE:  Electronically signed by Randall Brown RN on 5/23/24 at 4:21 PM EDT    CASE MANAGEMENT/SOCIAL WORK SECTION    Inpatient Status Date: 5-18-24    Readmission Risk Assessment Score:  Readmission Risk              Risk of Unplanned Readmission:  17           Discharging to Facility/ Agency   Name: [facility] [phone]  Address:  Phone: [phone]  Fax:    Dialysis Facility (if applicable)   Name:  Address:  Dialysis Schedule:  Phone:  Fax:    / signature: Electronically signed by Analia Heller on 5/23/24 at 2:49 PM EDT    PHYSICIAN SECTION    Prognosis: {Prognosis:5105636427}    Condition at Discharge: { Patient Condition:657759338}    Rehab Potential (if transferring to Rehab): {Prognosis:2622018993}    Recommended Labs or Other Treatments After Discharge: ***    Physician Certification: I certify the above information and transfer of Evangelina King  is necessary for the continuing treatment of the

## 2024-05-24 ENCOUNTER — TELEPHONE (OUTPATIENT)
Dept: GYNECOLOGIC ONCOLOGY | Age: 44
End: 2024-05-24

## 2024-05-24 ENCOUNTER — CARE COORDINATION (OUTPATIENT)
Dept: CASE MANAGEMENT | Age: 44
End: 2024-05-24

## 2024-05-24 DIAGNOSIS — R10.2 PELVIC PAIN: Primary | ICD-10-CM

## 2024-05-24 LAB
MICROORGANISM SPEC CULT: ABNORMAL
SERVICE CMNT-IMP: ABNORMAL
SPECIMEN DESCRIPTION: ABNORMAL

## 2024-05-24 NOTE — TELEPHONE ENCOUNTER
Patient called stating that she had surgery with Dr. Clayton 5/20/24. She was upset because it looks like her  called and schedule follow-up appt with central scheduling for 7/19/24, and her AVS stated for her to be seen for follow-up in 1 week with Dr. Agrawal. Patient stated she would be more comfortable seeing a woman for her follow-up. I have her scheduled to see Arlene Tse on 5/30/24. Patient was agreeable. Patient also stated that she was told she needed to have a PET Scan. I looked in her chart and saw the order. Patient is scheduled for PET on 6/3/24. I called patient back to give the details. Patient thanked writer and voiced understanding.

## 2024-05-24 NOTE — CARE COORDINATION
floor and vaginal area.  She is anxious and still processing her latest diagnosis.  Patient was seen by gynecology oncologist, had D&C with hysteroscopy and cervical biopsies taken.  Patient was ordered a PET scan as outpatient and is to follow up with urology and Dr. Lomeli.  Patient does not have PCP, has been to the walk in clinic for eval at [facility] but is not established with PCP.  Per notes, oncologist did refer oncology navigator and explained they would be reaching out to her as well.  Patient discharged to home on IV ATB, [facility] is to follow.  She got her delivery of supplies and La has been in contact with spouse.  Currently, she has some pain to her flank area and pelvic area but is better than what she was in the hospital.  She has good family support, has cousin that is a nurse with her and family is taking turns coming to help. She has 2 teenage children, 17 and 14.  Spouse has already gotten her in touch with a counselor and she plans on seeing someone soon.  Patient has some black spotting but no black vaginal discharge as before, she said now is just black and grainy. She is not urinating, does dribble a little but has bilateral nephrostomy tubes.   Discharge instructions reviewed, [address] F  done. Patient has all new medications at home. She had a bm when she came home, had not moved her bowels in 5 days and had small mushy bm yesterday.  She had a little bit of nausea this am that has since subsided.  She has follow up with Dr. Clayton in July, offered assistance scheduling her oncology and urology appointments but she declined and will have spouse call.  She has PET scan ordered, expressed that oncology office navigator typically gets these set up and should be reaching out to her about appointment.      Care Transition Nurse reviewed discharge instructions with patient who verbalized understanding. The patient was given an opportunity to ask questions and does not have any

## 2024-05-25 ASSESSMENT — ENCOUNTER SYMPTOMS
ABDOMINAL PAIN: 0
ABDOMINAL DISTENTION: 0

## 2024-05-26 LAB
MICROORGANISM SPEC CULT: NORMAL
MICROORGANISM SPEC CULT: NORMAL
MICROORGANISM/AGENT SPEC: NORMAL
MICROORGANISM/AGENT SPEC: NORMAL
SPECIMEN DESCRIPTION: NORMAL

## 2024-05-28 ENCOUNTER — CARE COORDINATION (OUTPATIENT)
Dept: CASE MANAGEMENT | Age: 44
End: 2024-05-28

## 2024-05-28 ENCOUNTER — TELEPHONE (OUTPATIENT)
Dept: ONCOLOGY | Age: 44
End: 2024-05-28

## 2024-05-28 NOTE — TELEPHONE ENCOUNTER
Name: Evangelina King  : 1980  MRN: [id number]    Oncology Navigation- Initial Note:  Writer received in-patient referral. Reviewing chart. Patient discharged from hospital 24.  Phone call to Dr. Clayton's office to check if patient has received biopsy result. Elana unsure.  Referral for medical oncology was made to Danielle Call. Patient resides in [place].   Intake-  Contact Type: Telephone    Continuum of Care: Diagnosis/Active Treatment    Notes:   Phone call to patient to assess knowledge of biopsy result. Patient states no one has told her anything. She is knowledgeable of upcoming appts. She has been in conversation with Lyric at [facility] to get an appt with Dr. Lomeli.   Writer provides phone number and hours of availability is she has questions or concerns.   Writer receives call from Dr. Lomeli who requests patient be schedule for consult 24. Lyric MCKEE LPN notified and provides appt for 24 at 11:00.  Patient notified by phone. She becomes upset that Lyric did not call her to schedule and hangs up.  Writer receives call from Lyric who states Dr. Lomeli changed his mind about the appt and wants to see patient after PET results. She will contact patient to explain.      Electronically signed by Lore Estrada RN on 2024 at 11:36 AM

## 2024-05-28 NOTE — CARE COORDINATION
Care Transitions Follow Up Call    Patient Current Location:  Home: [address] [place] [address] Dr  [place] [address] Care Coordinator contacted the patient by telephone to follow up after admission on 24  Verified name and  with patient as identifiers.    Patient: Evangelina King  Patient : 1980   MRN: [id number]  Reason for Admission: pelvic pain   Discharge Date: 24 RARS: Readmission Risk Score: 14      Needs to be reviewed by the provider   Additional needs identified to be addressed with provider: No  none             Method of communication with provider: none.    Writer spoke to Evangelina today. She reports that the day that [facility] was supposed to come out they did not come out last Friday they ended up coming out the next day. Pt is on IV ATB she stated that her daughter administer medication.   [facility] will be out to replace port tomorrow. Neph tubes are draining Rt side more painful than the Lt side she does not want any stronger pain medication at this time. She denies any vaginal spotting. Bowels are moving. She denies N/VD. Discussed following up with Darling Greer for appointment she stated that she will. Next appointments 24 post-op 6/3/24 Pet Scan 6/3/24 consult.    Addressed changes since last contact:   Home care came out no spotting follow up appointments   Discussed follow-up appointments. If no appointment was previously scheduled, appointment scheduling offered: Yes.   Is follow up appointment scheduled within 7 days of discharge? Yes.    Follow Up  Future Appointments   Date Time Provider Department Center   2024 10:00 AM Arlene Penn, APRN - CNP GYN Oncology [facility]   6/3/2024  9:00 AM [place] INJECTION ROOM [facility] PB NM [facility] [place]   6/3/2024 10:00 AM [place] PET/CT ROOM PB PB NM [facility] [place]   6/3/2024 11:00 AM SCHEDULE, JOYCE [place] RAD ONC NURSE [facility] PB RONC [place]   2024  1:30 PM Jewell Clayton MD [place] gynonc [facility] Care

## 2024-05-29 ENCOUNTER — CARE COORDINATION (OUTPATIENT)
Dept: CARE COORDINATION | Age: 44
End: 2024-05-29

## 2024-05-29 ENCOUNTER — TELEPHONE (OUTPATIENT)
Dept: UROLOGY | Age: 44
End: 2024-05-29

## 2024-05-29 NOTE — TELEPHONE ENCOUNTER
Contacted patient to follow up in regards to follow up from hospital. Patient was informed that office has contacted the [place] office and that office will be following up with her to get scheduled and inform of next steps. Patient was informed that she can contact this office if needed to follow up. Patient verbalizes understanding and call was ended.

## 2024-05-29 NOTE — CARE COORDINATION
Per CTN request f/u appt. Needed w/ Dr. Ludin Barbosa- Urology.  Called Urology office spoke to Opal who states the patient called for a Follow up appointment.  The office is working on getting her scheduled in [place] and they will call the patient back with her follow up appointment date and time. CC: 6.5.24.

## 2024-05-30 ENCOUNTER — OFFICE VISIT (OUTPATIENT)
Dept: GYNECOLOGIC ONCOLOGY | Age: 44
End: 2024-05-30

## 2024-05-30 ENCOUNTER — TELEPHONE (OUTPATIENT)
Dept: UROLOGY | Age: 44
End: 2024-05-30

## 2024-05-30 VITALS
BODY MASS INDEX: 42.53 KG/M2 | SYSTOLIC BLOOD PRESSURE: 148 MMHG | WEIGHT: 271 LBS | HEIGHT: 67 IN | OXYGEN SATURATION: 97 % | DIASTOLIC BLOOD PRESSURE: 94 MMHG | HEART RATE: 109 BPM

## 2024-05-30 DIAGNOSIS — F33.0 MILD EPISODE OF RECURRENT MAJOR DEPRESSIVE DISORDER (HCC): ICD-10-CM

## 2024-05-30 DIAGNOSIS — E66.01 OBESITY, CLASS III, BMI 40-49.9 (MORBID OBESITY) (HCC): ICD-10-CM

## 2024-05-30 DIAGNOSIS — F33.1 MAJOR DEPRESSIVE DISORDER, RECURRENT, MODERATE (HCC): ICD-10-CM

## 2024-05-30 DIAGNOSIS — F33.0 MAJOR DEPRESSIVE DISORDER, RECURRENT, MILD (HCC): ICD-10-CM

## 2024-05-30 DIAGNOSIS — C53.0 MALIGNANT NEOPLASM OF ENDOCERVIX (HCC): Primary | ICD-10-CM

## 2024-05-30 PROBLEM — F33.9 MAJOR DEPRESSIVE DISORDER, RECURRENT, UNSPECIFIED (HCC): Status: ACTIVE | Noted: 2024-05-30

## 2024-05-30 PROCEDURE — 99024 POSTOP FOLLOW-UP VISIT: CPT | Performed by: NURSE PRACTITIONER

## 2024-05-30 ASSESSMENT — ENCOUNTER SYMPTOMS
EYES NEGATIVE: 1
BACK PAIN: 1
RESPIRATORY NEGATIVE: 1
GASTROINTESTINAL NEGATIVE: 1

## 2024-05-30 NOTE — PROGRESS NOTES
Review of Systems   Constitutional:  Positive for appetite change (not hungry).   HENT:  Negative.     Eyes: Negative.    Respiratory: Negative.     Cardiovascular: Negative.    Gastrointestinal: Negative.    Endocrine: Positive for hot flashes.   Genitourinary:  Positive for pelvic pain (cramping).    Musculoskeletal:  Positive for back pain.   Skin: Negative.    Neurological: Negative.    Hematological: Negative.       
satisfaction.    Plan:  PET scan June 3rd, 2024  Rad on. Appt. June 3rd  Med. Onc appt., virtual with Dr. Lomeli in [place] (pt. To confirm)  I contacted Dr. Barbosa's office to verify where patient to be seen with her bilateral percutaneous nephrostomy tubes  Continue with home IV antibiotic therapy of Ertapenem through June 1st.  RTO June 10th for pelvic examination  I spent 40 min. With patient discussing her cancer diagnosis, pathology, probably treatment modalities          Continue activity restrictions for another 1 week.    Electronically signed by FERNANDO Talamantes CNP on 5/30/24 at 11:57 AM EDT

## 2024-05-30 NOTE — TELEPHONE ENCOUNTER
5/30/2024--Writer received a telephone call from [facility] Urology yesterday stating patient had surgery with Dr Barbosa. Caller from [facility] states patient needs an appointment to follow care of her nephrostomy tubes with Dr Martinez or Dr Crowell at [facility] as patient lives in [place].      Writer contacted patient and patient declined to make an appointment with Dr Martinez or Dr Crowell here in [place].  Patient states Dr. Clayton is checking who she is to see.  Writer informed patient she can call back if she would like to make an appointment.

## 2024-05-31 ENCOUNTER — TELEPHONE (OUTPATIENT)
Dept: GYNECOLOGIC ONCOLOGY | Age: 44
End: 2024-05-31

## 2024-05-31 DIAGNOSIS — Q62.11 HYDRONEPHROSIS WITH URETEROPELVIC JUNCTION (UPJ) OBSTRUCTION: Primary | ICD-10-CM

## 2024-05-31 DIAGNOSIS — C53.0 MALIGNANT NEOPLASM OF ENDOCERVIX (HCC): Primary | ICD-10-CM

## 2024-05-31 NOTE — PROGRESS NOTES
I've placed an order for IR (in [facility]) to replace patient's bilateral nephrostomy tubes every 6 weeks starting in beginning of July. There should be 10 appointments scheduled. We will contact patient and inform her of this. tbc

## 2024-05-31 NOTE — TELEPHONE ENCOUNTER
Order obtained for patient to have TEMPUS completed.  Patient to have $0 out of pocket cost.  Patient to have blood drawn via lab on 6/3/24.  Patient notified of date/time/location/instructions for TEMPUS.  Patient voiced understanding and appreciation.     Patient tearful and voiced concerns with financial situation and employment.  Referral placed for .  Patient voiced understanding and appreciation.

## 2024-05-31 NOTE — TELEPHONE ENCOUNTER
Called and spoke with patient in regards to IR order.  Informed patient that IR may be calling to schedule appointments and encouraged patient to schedule at least first 6 appointments.  Writer also informed patient that per DNP / Surgeon that 6/10/24 appointment can be changed to a video visit unless patient having bleeding or concerns.  Patient voiced understanding and appreciation.

## 2024-06-03 ENCOUNTER — HOSPITAL ENCOUNTER (OUTPATIENT)
Dept: NUCLEAR MEDICINE | Age: 44
Discharge: HOME | End: 2024-06-05
Attending: OBSTETRICS & GYNECOLOGY
Payer: COMMERCIAL

## 2024-06-03 ENCOUNTER — TELEPHONE (OUTPATIENT)
Dept: ONCOLOGY | Age: 44
End: 2024-06-03

## 2024-06-03 ENCOUNTER — HOSPITAL ENCOUNTER (OUTPATIENT)
Dept: RADIATION ONCOLOGY | Age: 44
Discharge: HOME | End: 2024-06-03
Payer: COMMERCIAL

## 2024-06-03 ENCOUNTER — HOSPITAL ENCOUNTER (OUTPATIENT)
Age: 44
Discharge: HOME | End: 2024-06-03
Payer: COMMERCIAL

## 2024-06-03 ENCOUNTER — HOSPITAL ENCOUNTER (OUTPATIENT)
Dept: NUCLEAR MEDICINE | Age: 44
End: 2024-06-03
Attending: OBSTETRICS & GYNECOLOGY
Payer: COMMERCIAL

## 2024-06-03 VITALS
HEART RATE: 102 BPM | BODY MASS INDEX: 40.41 KG/M2 | DIASTOLIC BLOOD PRESSURE: 85 MMHG | WEIGHT: 258 LBS | TEMPERATURE: 97.8 F | RESPIRATION RATE: 16 BRPM | SYSTOLIC BLOOD PRESSURE: 128 MMHG

## 2024-06-03 DIAGNOSIS — C53.0 MALIGNANT NEOPLASM OF ENDOCERVIX (HCC): Primary | ICD-10-CM

## 2024-06-03 DIAGNOSIS — C54.1 ENDOMETRIAL CANCER (HCC): ICD-10-CM

## 2024-06-03 DIAGNOSIS — C53.0 MALIGNANT NEOPLASM OF ENDOCERVIX (HCC): ICD-10-CM

## 2024-06-03 DIAGNOSIS — N85.8 UTERINE MASS: ICD-10-CM

## 2024-06-03 LAB
GLUCOSE BLD-MCNC: 90 MG/DL (ref 65–105)
SEND OUT REPORT: NORMAL
TEST NAME: NORMAL

## 2024-06-03 PROCEDURE — 36415 COLL VENOUS BLD VENIPUNCTURE: CPT

## 2024-06-03 PROCEDURE — 3430000000 HC RX DIAGNOSTIC RADIOPHARMACEUTICAL: Performed by: OBSTETRICS & GYNECOLOGY

## 2024-06-03 PROCEDURE — 82947 ASSAY GLUCOSE BLOOD QUANT: CPT

## 2024-06-03 PROCEDURE — 99213 OFFICE O/P EST LOW 20 MIN: CPT | Performed by: RADIOLOGY

## 2024-06-03 PROCEDURE — 2580000003 HC RX 258: Performed by: OBSTETRICS & GYNECOLOGY

## 2024-06-03 PROCEDURE — A9609 HC RX DIAGNOSTIC RADIOPHARMACEUTICAL: HCPCS | Performed by: OBSTETRICS & GYNECOLOGY

## 2024-06-03 RX ORDER — FLUDEOXYGLUCOSE F 18 200 MCI/ML
10 INJECTION, SOLUTION INTRAVENOUS
Status: COMPLETED | OUTPATIENT
Start: 2024-06-03 | End: 2024-06-03

## 2024-06-03 RX ORDER — SODIUM CHLORIDE 0.9 % (FLUSH) 0.9 %
10 SYRINGE (ML) INJECTION ONCE
Status: COMPLETED | OUTPATIENT
Start: 2024-06-03 | End: 2024-06-03

## 2024-06-03 RX ORDER — TRAMADOL HYDROCHLORIDE 50 MG/1
50 TABLET ORAL EVERY 6 HOURS PRN
COMMUNITY

## 2024-06-03 RX ADMIN — SODIUM CHLORIDE, PRESERVATIVE FREE 10 ML: 5 INJECTION INTRAVENOUS at 08:50

## 2024-06-03 RX ADMIN — FLUDEOXYGLUCOSE F 18 13.4 MILLICURIE: 200 INJECTION, SOLUTION INTRAVENOUS at 08:50

## 2024-06-03 ASSESSMENT — PAIN DESCRIPTION - ORIENTATION: ORIENTATION: RIGHT

## 2024-06-03 ASSESSMENT — PAIN SCALES - GENERAL: PAINLEVEL_OUTOF10: 2

## 2024-06-03 ASSESSMENT — PAIN DESCRIPTION - LOCATION: LOCATION: BACK

## 2024-06-03 NOTE — TELEPHONE ENCOUNTER
Writer assumed oncology navigation per pt request.  Dr. Lomeli contacted writer requesting pt scheduled for f/u tomorrow @ [facility].  Dr. Lomeli requested today's PET/CT scan read asap.  Spoke w/Elaida /Danielle PET/CT tech, updated on Dr. Lomeli's request.  Eladia stated will request stat read.      Name: Evangelina King  : 1980  MRN: [id number]    Oncology Navigation Follow-Up Note    Contact Type:  Radiation Oncology    Notes: Pt @ [facility] for Dr. Vinson consult.  Met w/pt & pt's mother prior to appt.  Notified pt writer will be navigating oncology care & encouraged to contact writer prn.  Writer's business card given to pt.  Discussed potential barriers to care.  Pt verbalized financial concerns r/t recent job change & unable to work currently.  Pt tearful, support given.  Discussed community resources w/pt, offered referral to OCC & NG, pt agreeable to OCC & declined [facility] @ this time.  Notified pt [place] [facility] , to meet w/pt today.  Inquired on smoking/alcohol/drug use.  Pt stated former 1/2 PPD smoker, quit 2024.  Pt stated drinks socially & denied drug use.  Pt updated on Dr. Lomeli's request on f/u appt, pt agreeable.  Spoke w/Ana, [place] [facility], updated on pt & requested appt.  Pt scheduled  @ 1545.  Pt updated on appt details, verbalized understanding & denied questions/concerns.  Encouraged to contact writer prn.  Attempted to contact LANNY Thurman, no answer,  left updated on pt.  Will continue to follow.      Electronically signed by Jessa Skaggs RN on 6/3/2024 at 12:24 PM

## 2024-06-03 NOTE — PROGRESS NOTES
Referring Physician:   Dr. Agrawal      Vitals:    24 1145   BP: 128/85   Pulse: (!) 102   Resp: 16   Temp: 97.8 °F (36.6 °C)    :     Pain Assessment: 0-10  Pain Level: 2       Wt Readings from Last 1 Encounters:   24 117 kg (258 lb)        Body mass index is 40.41 kg/m².              Smoking Status:    [] Smoker - PPD:   [x] Nonsmoker - Quit Date:                [] Never a smoker      No chief complaint on file.       Cancer Staging   No matching staging information was found for the patient.    Prior Radiation Therapy? No   If yes, site treated:   Facility:                             Date:    Concurrent Chemo/radiation?  Possibly, pending PET scan results      Prior Hormonal Therapy or Contraceptive Medications?  Yes-Took for 3-4 months only.            Pacemaker/Defibrillator/ICD:  No    Do you have any musculoskeletal diseases, Lupus or arthritic conditions?  No   If yes, are you currently taking Methotrexate?  []  Yes  [x]  No           BREAST/GYN  Patient only:       Age at first Menses:  12    :  4    Para:  3                    Current Outpatient Medications   Medication Sig Dispense Refill    traMADol (ULTRAM) 50 MG tablet Take 1 tablet by mouth every 6 hours as needed for Pain. Max Daily Amount: 200 mg      sertraline (ZOLOFT) 25 MG tablet Take 1 tablet by mouth daily 30 tablet 3    lidocaine 4 % external patch Place 1 patch onto the skin daily 30 patch 0    gabapentin (NEURONTIN) 300 MG capsule Take 1 capsule by mouth 3 times daily as needed (pain) for up to 60 days. 90 capsule 1    cyclobenzaprine (FLEXERIL) 10 MG tablet Take 1 tablet by mouth 3 times daily as needed for Muscle spasms 90 tablet 0    acetaminophen (TYLENOL) 500 MG tablet Take 2 tablets by mouth every 6 hours as needed for Pain 60 tablet 0     No current facility-administered medications for this encounter.       Past Medical History:   Diagnosis Date    Cancer (HCC)     14 years ago per patient       Past

## 2024-06-03 NOTE — TELEPHONE ENCOUNTER
received referral stating financial concerns.  met with patient following Radiation appointment. Patient recently changed jobs between two [employer] and was diagnosed with cancer. Patient's family has gone from two incomes to one ( currently working) and patient is worried about bills. Patient has two kids in household. Patient does not believe she will be eligible for financial assistance since she has recently worked and they look back 3+ months at income.  looking into other assistance opportunities.  reviewed role and patient asking about [facility]. While in room with patient, she received call about PET scan needing to be redone tomorrow before her Medical Oncology appointment.

## 2024-06-03 NOTE — ACP (ADVANCE CARE PLANNING)
Advance Care Planning     Hospice Services: Patient is not currently receiving hospice services/has not received hospice care within the performance year.    Advance Care Planning was discussed with patient, and pt given blank copies of ACP documents to look over.

## 2024-06-04 ENCOUNTER — OFFICE VISIT (OUTPATIENT)
Dept: ONCOLOGY | Age: 44
End: 2024-06-04
Payer: COMMERCIAL

## 2024-06-04 ENCOUNTER — HOSPITAL ENCOUNTER (OUTPATIENT)
Dept: NUCLEAR MEDICINE | Age: 44
Discharge: HOME | End: 2024-06-06
Attending: OBSTETRICS & GYNECOLOGY
Payer: COMMERCIAL

## 2024-06-04 VITALS
TEMPERATURE: 97.2 F | DIASTOLIC BLOOD PRESSURE: 90 MMHG | OXYGEN SATURATION: 96 % | HEART RATE: 110 BPM | HEIGHT: 67 IN | WEIGHT: 260.1 LBS | SYSTOLIC BLOOD PRESSURE: 126 MMHG | BODY MASS INDEX: 40.82 KG/M2

## 2024-06-04 DIAGNOSIS — E61.1 IRON DEFICIENCY: ICD-10-CM

## 2024-06-04 DIAGNOSIS — C53.8 MALIGNANT NEOPLASM OF OVERLAPPING SITES OF CERVIX (HCC): Primary | ICD-10-CM

## 2024-06-04 LAB — GLUCOSE BLD-MCNC: 94 MG/DL (ref 65–105)

## 2024-06-04 PROCEDURE — 99215 OFFICE O/P EST HI 40 MIN: CPT | Performed by: INTERNAL MEDICINE

## 2024-06-04 PROCEDURE — 82947 ASSAY GLUCOSE BLOOD QUANT: CPT

## 2024-06-04 PROCEDURE — 78815 PET IMAGE W/CT SKULL-THIGH: CPT

## 2024-06-04 PROCEDURE — 2580000003 HC RX 258: Performed by: OBSTETRICS & GYNECOLOGY

## 2024-06-04 PROCEDURE — A9609 HC RX DIAGNOSTIC RADIOPHARMACEUTICAL: HCPCS | Performed by: OBSTETRICS & GYNECOLOGY

## 2024-06-04 PROCEDURE — 3430000000 HC RX DIAGNOSTIC RADIOPHARMACEUTICAL: Performed by: OBSTETRICS & GYNECOLOGY

## 2024-06-04 PROCEDURE — 99213 OFFICE O/P EST LOW 20 MIN: CPT | Performed by: INTERNAL MEDICINE

## 2024-06-04 RX ORDER — SODIUM CHLORIDE 0.9 % (FLUSH) 0.9 %
10 SYRINGE (ML) INJECTION
Status: COMPLETED | OUTPATIENT
Start: 2024-06-04 | End: 2024-06-04

## 2024-06-04 RX ORDER — ALPRAZOLAM 0.5 MG/1
0.5 TABLET ORAL 3 TIMES DAILY PRN
Qty: 30 TABLET | Refills: 0 | Status: SHIPPED | OUTPATIENT
Start: 2024-06-04 | End: 2024-06-19

## 2024-06-04 RX ORDER — FLUDEOXYGLUCOSE F 18 200 MCI/ML
10 INJECTION, SOLUTION INTRAVENOUS
Status: COMPLETED | OUTPATIENT
Start: 2024-06-04 | End: 2024-06-04

## 2024-06-04 RX ADMIN — SODIUM CHLORIDE, PRESERVATIVE FREE 10 ML: 5 INJECTION INTRAVENOUS at 09:20

## 2024-06-04 RX ADMIN — FLUDEOXYGLUCOSE F 18 12.92 MILLICURIE: 200 INJECTION, SOLUTION INTRAVENOUS at 09:25

## 2024-06-04 NOTE — PROGRESS NOTES
Evangelina King                                                                                                                  6/4/2024  MRN:   [id number]  YOB: 1980  PCP:                           Nell Greer, FERNANDO - CNP  Referring Physician: No ref. 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  She presented to an [facility] for evaluation of abnormal uterine bleeding for 2 years with associated foul-smelling, black vaginal discharge for 6 weeks and associated urinary frequency.  She was also noted to have fevers at this time.  Imaging at [facility] showed a thickened endometrium and hydronephrosis.  She was transferred to [place] for GYN evaluation for concern for malignancy with thickened endometrium and hydronephrosis.     She had an EUA, vaginoscopy, endometrial/cervical biopsies, cystoscopy, bladder biopsy on 5/20/2024  IR has been consulted for bilateral PERC neph tubes  case discussed with GYN-ONC , they suspect endometrial versus cervical cancer. Not a surgical candidate   Pt just back from [facility], she is in a lot of pain, hard to get much information     Interim History:  Patient presents to the clinic accompanied by her mother to discuss further treatment plan.  Patient had a CT PET today.  CT PET shows FDG avid inguinal lymph nodes.  Patient

## 2024-06-04 NOTE — CONSULTS
tablet, Take 1 tablet by mouth daily, Disp: 30 tablet, Rfl: 3    lidocaine 4 % external patch, Place 1 patch onto the skin daily, Disp: 30 patch, Rfl: 0    gabapentin (NEURONTIN) 300 MG capsule, Take 1 capsule by mouth 3 times daily as needed (pain) for up to 60 days. (Patient taking differently: Take 1 capsule by mouth at bedtime.), Disp: 90 capsule, Rfl: 1    cyclobenzaprine (FLEXERIL) 10 MG tablet, Take 1 tablet by mouth 3 times daily as needed for Muscle spasms, Disp: 90 tablet, Rfl: 0    acetaminophen (TYLENOL) 500 MG tablet, Take 2 tablets by mouth every 6 hours as needed for Pain, Disp: 60 tablet, Rfl: 0    ALLERGIES:   No Known Allergies    SOCIAL HISTORY:  Social History     Socioeconomic History    Marital status:      Spouse name: None    Number of children: None    Years of education: None    Highest education level: None   Occupational History    Occupation: Bank    Tobacco Use    Smoking status: Former     Current packs/day: 0.00     Types: Cigarettes     Quit date: 2024     Years since quittin.0    Smokeless tobacco: Never   Substance and Sexual Activity    Alcohol use: Not Currently     Comment: Social    Drug use: Never    Sexual activity: Yes     Comment: Prior to diagnosis     Social Determinants of Health     Food Insecurity: No Food Insecurity (2024)    Hunger Vital Sign     Worried About Running Out of Food in the Last Year: Never true     Ran Out of Food in the Last Year: Never true   Transportation Needs: No Transportation Needs (2024)    PRAPARE - Transportation     Lack of Transportation (Medical): No     Lack of Transportation (Non-Medical): No   Housing Stability: Low Risk  (2024)    Housing Stability Vital Sign     Unable to Pay for Housing in the Last Year: No     Number of Places Lived in the Last Year: 1     Unstable Housing in the Last Year: No       FAMILY HISTORY:  Family History   Adopted: Yes   Problem Relation Age of Onset    Lung

## 2024-06-05 ENCOUNTER — TELEPHONE (OUTPATIENT)
Dept: INTERVENTIONAL RADIOLOGY/VASCULAR | Age: 44
End: 2024-06-05

## 2024-06-05 ENCOUNTER — TELEPHONE (OUTPATIENT)
Dept: GYNECOLOGIC ONCOLOGY | Age: 44
End: 2024-06-05

## 2024-06-05 ENCOUNTER — TELEPHONE (OUTPATIENT)
Dept: UROLOGY | Age: 44
End: 2024-06-05

## 2024-06-05 ENCOUNTER — TELEPHONE (OUTPATIENT)
Dept: ONCOLOGY | Age: 44
End: 2024-06-05

## 2024-06-05 ENCOUNTER — OFFICE VISIT (OUTPATIENT)
Dept: UROLOGY | Age: 44
End: 2024-06-05
Payer: COMMERCIAL

## 2024-06-05 VITALS
HEIGHT: 67 IN | DIASTOLIC BLOOD PRESSURE: 82 MMHG | HEART RATE: 106 BPM | OXYGEN SATURATION: 97 % | SYSTOLIC BLOOD PRESSURE: 130 MMHG | WEIGHT: 255.5 LBS | BODY MASS INDEX: 40.1 KG/M2

## 2024-06-05 DIAGNOSIS — N13.30 BILATERAL HYDRONEPHROSIS: Primary | ICD-10-CM

## 2024-06-05 LAB
PD-L1 BY 22C3 TISS IMSTN DOC: NEGATIVE
TEMPUS PD-L1 (22C3) COMBINED POSITIVE SCORE: <1
TEMPUS PD-L1 (22C3) TUMOR PROPORTION SCORE: <1 %
TEMPUS PORTAL: NORMAL

## 2024-06-05 PROCEDURE — 99204 OFFICE O/P NEW MOD 45 MIN: CPT | Performed by: UROLOGY

## 2024-06-05 NOTE — PROGRESS NOTES
traMADol (ULTRAM) 50 MG tablet Take 1 tablet by mouth every 6 hours as needed for Pain.      sertraline (ZOLOFT) 25 MG tablet Take 1 tablet by mouth daily 30 tablet 3    lidocaine 4 % external patch Place 1 patch onto the skin daily 30 patch 0    gabapentin (NEURONTIN) 300 MG capsule Take 1 capsule by mouth 3 times daily as needed (pain) for up to 60 days. (Patient taking differently: Take 1 capsule by mouth at bedtime.) 90 capsule 1    cyclobenzaprine (FLEXERIL) 10 MG tablet Take 1 tablet by mouth 3 times daily as needed for Muscle spasms 90 tablet 0    acetaminophen (TYLENOL) 500 MG tablet Take 2 tablets by mouth every 6 hours as needed for Pain 60 tablet 0       Patient has no known allergies.  Social History     Tobacco Use   Smoking Status Former    Current packs/day: 0.00    Types: Cigarettes    Quit date: 2024    Years since quittin.0   Smokeless Tobacco Never       Social History     Substance and Sexual Activity   Alcohol Use Not Currently    Comment: Social       REVIEW OF SYSTEMS:  Constitutional: negative  Eyes: negative  Respiratory: negative  Cardiovascular: negative  Gastrointestinal: negative  Genitourinary: negative except for what is in HPI  Musculoskeletal: negative  Skin: negative   Neurological: negative  Hematological/Lymphatic: negative  Psychological: negative    Physical Exam:    This a 44 y.o. female      Vitals:    24 1133   BP: 130/82   Pulse: (!) 106   SpO2: 97%     Constitutional: Patient in no acute distress.  Neuro: Alert and oriented to person, place and time.        Assessment and Plan      1. Bilateral hydronephrosis           Plan:       Getting radiation soon in hopes for reduction of tumor burden  Also going to get chemotherapy  Will need neph tube drainage until burden is reduced  Will need these tubes exchanged every 6-8 weeks; will make sure to arrange this schedule  She requests for anesthesia for these exchanges  Will see if hydro improves at next staging

## 2024-06-05 NOTE — TELEPHONE ENCOUNTER
Spoke with multiple IR departments. Due to patient having bad experience with the neph tube placement. She is requesting to have sedation with the exchange. After contacting the IR departments. They do not use sedation for the exchange only a numbing agent. Left message for patient to return call

## 2024-06-05 NOTE — TELEPHONE ENCOUNTER
Farrah from 's office called asking if  can help coordinate patients Neph Tube changes. Patient needs tube changes every six weeks. Stated patient will be due for her first tube change the first week of July.

## 2024-06-05 NOTE — TELEPHONE ENCOUNTER
Name: Evangelina King  : 1980  MRN: [id number]    Oncology Navigation Follow-Up Note    Contact Type:  Telephone    Notes: Dr. Lomeli alerted writer pt completed f/u @ [facility] yesterday, PET/CT scan results reviewed w/pt, referral to  IR for inguinal lymph node bx & port placement.  Dr. Lomeli requested writer expedite bx & port placement.  Spoke w/Courtney,  IR , updated on pt & Dr. Lomeli's request.  Courtney stated will send to IR MD now for review & approval to schedule.  Pt updated on conversation w/Courtney.  Pt verbalized concerns r/t Dr. Vinson & Dr. Lomeli's different discussion of disease status.  Notified pt writer will speak w/MD's.  Dr. Lomeli & Dr. Vinson updated.  Dr. Vinson stated PET/CT scan results unavailable @ consult & pt notified await PET/CT scan results for further staging.  Attempted to contact pt, no answer, VM left updated on Dr. Vinson's response & stressed importance of bx.  Encouraged to contact writer prn.  Will continue to follow.         Electronically signed by Jessa Skaggs RN on 2024 at 12:45 PM

## 2024-06-05 NOTE — TELEPHONE ENCOUNTER
Urology office called and staff informed the gyn onc surgeon recommends urostomy tubes be changed every 6 weeks.  Staff informed that order placed and patient due for exchange around first week of July.  Writer requested urology office assist patient in coordinating nephrostomy exchanges.  Staff voiced understanding and appreciation.

## 2024-06-06 ENCOUNTER — TELEPHONE (OUTPATIENT)
Dept: GYNECOLOGIC ONCOLOGY | Age: 44
End: 2024-06-06

## 2024-06-06 ENCOUNTER — TELEPHONE (OUTPATIENT)
Dept: ONCOLOGY | Age: 44
End: 2024-06-06

## 2024-06-06 NOTE — TELEPHONE ENCOUNTER
Spoke with patient, let her know IR does not do neph tube exchanges under sedation, they typically use a numbing agent. Explained to patient now that the tubes are established it should not be as painful. Encouraged patient to discuss with pcp or [facility]'s office about ordering an anxiety medication prior to exchange. Patient said dr soto did order xanax but has not taken it due to being home alone with her children. Did let patient know she will need a  for the neph tube exchanges. Patient voiced understanding. Writer called IR to schedule Next neph tube exchange.

## 2024-06-06 NOTE — TELEPHONE ENCOUNTER
Left message for patient, is scheduled with IR at [facility] on 7/5/24 with 12pm arrival.

## 2024-06-06 NOTE — TELEPHONE ENCOUNTER
Name: Evangelina King  : 1980  MRN: [id number]    Oncology Navigation Follow-Up Note    Contact Type:  Telephone    Notes: Upon review of chart noted [facility] , attempted to contact pt to schedule bx & port placement yesterday.  Spoke w/pt to inquire on scheduling bx/port w/Courtney.  Pt stated did not listen to VM.  Offered Courtney's contact #, pt declined.  Pt stated will listen to VM & call Courtney \"when I have time\".  Stressed importance of bx to determine final tx plan & encouraged to contact writer prn.  Will continue to follow.      Electronically signed by Jessa Skaggs RN on 2024 at 1:57 PM

## 2024-06-06 NOTE — TELEPHONE ENCOUNTER
Called urology office to clarify if office to schedule patient's nephrostomy tube replacements under anesthesia.  Nurse informed writer that urologist does not replace nephrostomy tubes and office was in contact with IR departments to determine if procedure can be done under anesthesia.  Nurse then informed writer that urology office was attempting to call patient to determine if patient willing to take anxiety medication prior to nephrostomy replacement and request that PCP or gyn onc office provide anxiety medication.  Writer voiced understanding.  Writer discussed situation with DNP and DNP to discuss options with patient at 6/10/24 follow up appt.

## 2024-06-07 ENCOUNTER — CARE COORDINATION (OUTPATIENT)
Dept: CASE MANAGEMENT | Age: 44
End: 2024-06-07

## 2024-06-07 NOTE — CARE COORDINATION
Care Transitions Note  Follow Up Call     Patient Current Location:  Home: [address] [place] [address]    Care Transition Nurse contacted the patient by telephone. Verified name and  as identifiers.    Additional needs identified to be addressed with provider   No needs identified                 Method of communication with provider: none.    Care Summary Note: Spoke with patient today for transitional follow up call. She is feeling ok today, when asked about pain, she says \"I'm fine\" and other questions asked she was vague with responses.  Patient has had several follow ups since her discharge, has been feeling overwhelmed with new diagnosis.  She was seen by radiation oncologist who told her that her cancer was treatable but did not have the full report of PET scan back.  They wound up doing a stat read and when seen by oncologist, she said she was told she has 2 years.  Notes reviewed from visit with oncology about plan of care, patient to have port placed and biopsy done of lymph nodes in  but she has not scheduled this yet.  I had a lengthy conversation with patient about her diagnosis, patient was given the opportunity to voice her frustrations and concerns.  She has 2 children, ages 17 and 14, spouse is currently admitted and she has been relying on her Mother to take her to appointments.  She has a lot of anxiety, was prescribed Xanax by oncology but is afraid to take after reading up on it.  She had [facility] coming out to see her for her ATB after discharge.  This is complete now and they removed her IV and discharged her from home care.  She had follow up with urology yesterday and said that the urologist did not look at her nephrostomy tubes and told her he did not deal with nephrostomy tubes.  He did however get her scheduled for her next exchange, this is scheduled for  at [facility].  When asked about how the skin around tube insertion looks and she states that she has not done anything with

## 2024-06-10 ENCOUNTER — TELEMEDICINE (OUTPATIENT)
Dept: GYNECOLOGIC ONCOLOGY | Age: 44
End: 2024-06-10

## 2024-06-10 DIAGNOSIS — C53.9 PRIMARY CERVICAL SQUAMOUS CELL CARCINOMA (HCC): ICD-10-CM

## 2024-06-10 DIAGNOSIS — T14.8XXA NERVE COMPRESSION: ICD-10-CM

## 2024-06-10 DIAGNOSIS — R52 PAIN: Primary | ICD-10-CM

## 2024-06-10 RX ORDER — GABAPENTIN 300 MG/1
300 CAPSULE ORAL 3 TIMES DAILY PRN
Qty: 90 CAPSULE | Refills: 1 | Status: SHIPPED | OUTPATIENT
Start: 2024-06-10 | End: 2024-08-09

## 2024-06-10 RX ORDER — TRAMADOL HYDROCHLORIDE 50 MG/1
50 TABLET ORAL EVERY 6 HOURS PRN
Qty: 60 TABLET | Refills: 1 | Status: SHIPPED | OUTPATIENT
Start: 2024-06-10 | End: 2024-07-10

## 2024-06-10 RX ORDER — CYCLOBENZAPRINE HCL 10 MG
10 TABLET ORAL 3 TIMES DAILY PRN
Qty: 90 TABLET | Refills: 1 | Status: SHIPPED | OUTPATIENT
Start: 2024-06-10 | End: 2024-09-08

## 2024-06-10 ASSESSMENT — ENCOUNTER SYMPTOMS
RESPIRATORY NEGATIVE: 1
BACK PAIN: 1
GASTROINTESTINAL NEGATIVE: 1
EYES NEGATIVE: 1

## 2024-06-10 NOTE — PROGRESS NOTES
Evangelina King, was evaluated through a synchronous (real-time) audio-video encounter. The patient (or guardian if applicable) is aware that this is a billable service, which includes applicable co-pays. This Virtual Visit was conducted with patient's (and/or legal guardian's) consent. Patient identification was verified, and a caregiver was present when appropriate.   The patient was located at Home: [address] [place] [address] [place] [address]  Provider was located at Facility (Appt Dept): [address] [facility]  [address]  [place] [address]  Confirm you are appropriately licensed, registered, or certified to deliver care in the state where the patient is located as indicated above. If you are not or unsure, please re-schedule the visit: Yes, I confirm.     Evangelina King (:  1980) is a Established patient, presenting virtually for evaluation of the following:She is a 43y/o [id number]   female recently diagnosed with FIGO STAGE IV cervical cancer (was also considered a possible endometrial cancer); but Dr. Lomeli has informed the patient that the primary is cervical cancer.  On 24, Dr. Clayton performed a D & C, hysteroscopy, vaginal and cervical biopsies showing invasive moderately to poorly diffferentiated squamous cell carcinoma of the cervix, and invasive SCC of urinary bladder (p16+).   On May 21, IR inserted bilateral percutaneous nephrostomy tubes due to bilateral hydroureters and obstructive uropathy. Nephrologist, Dr. Barbosa performed a cystoscopy, retrograde pyleogram and bladder biopsy during the May 21st surgery. Urosepsis was suspected, ID consulted for positive clostridium septicemia. A PIC line placed and IV Ertapenem was started on  and will receive through  with home health visits.     She informs me that she is scheduled for her port placement on 24, and [facility] [facility]'s IR will be doing her LN biopsies on 24. She has met with Leopoldo Case. Onc.

## 2024-06-10 NOTE — PROGRESS NOTES
Review of Systems   Constitutional:  Positive for appetite change (not hungry).   HENT:  Negative.     Eyes: Negative.    Respiratory: Negative.     Cardiovascular: Negative.    Gastrointestinal: Negative.    Endocrine: Positive for hot flashes.   Genitourinary:  Positive for pelvic pain (cramping) and vaginal discharge.    Musculoskeletal:  Positive for back pain (nephrostomy tube).   Skin: Negative.    Neurological: Negative.

## 2024-06-11 ENCOUNTER — HOSPITAL ENCOUNTER (OUTPATIENT)
Age: 44
End: 2024-06-11
Payer: COMMERCIAL

## 2024-06-11 ENCOUNTER — HOSPITAL ENCOUNTER (OUTPATIENT)
Dept: RADIATION ONCOLOGY | Age: 44
Discharge: HOME | End: 2024-06-11
Payer: COMMERCIAL

## 2024-06-11 ENCOUNTER — HOSPITAL ENCOUNTER (OUTPATIENT)
Age: 44
Discharge: HOME | End: 2024-06-11
Payer: COMMERCIAL

## 2024-06-11 ENCOUNTER — TELEPHONE (OUTPATIENT)
Dept: ONCOLOGY | Age: 44
End: 2024-06-11

## 2024-06-11 DIAGNOSIS — N85.8 UTERINE MASS: ICD-10-CM

## 2024-06-11 DIAGNOSIS — C53.0 MALIGNANT NEOPLASM OF ENDOCERVIX (HCC): ICD-10-CM

## 2024-06-11 LAB
BUN SERPL-MCNC: 16 MG/DL (ref 6–20)
CREAT SERPL-MCNC: 1.2 MG/DL (ref 0.5–0.9)
DNA RANGE(S) EXAMINED NAR: NORMAL
GENE DIS ANL INTERP-IMP: POSITIVE
GENE DIS ASSESSED: NORMAL
GFR, ESTIMATED: 57 ML/MIN/1.73M2
HCG SERPL QL: NEGATIVE
MSI CA SPEC-IMP: NOT DETECTED
REASON FOR STUDY: NORMAL
TEMPUS LCA: NORMAL
TEMPUS PORTAL: NORMAL
TEMPUS THERAPY1: NORMAL
TEMPUS THERAPYCOUNT: 1
TEMPUS TRIALCOUNT: 3
TEMPUS TRIALMATCHES1: NORMAL
TEMPUS TRIALMATCHES2: NORMAL
TEMPUS TRIALMATCHES3: NORMAL

## 2024-06-11 PROCEDURE — 82565 ASSAY OF CREATININE: CPT

## 2024-06-11 PROCEDURE — 36415 COLL VENOUS BLD VENIPUNCTURE: CPT

## 2024-06-11 PROCEDURE — 84520 ASSAY OF UREA NITROGEN: CPT

## 2024-06-11 PROCEDURE — 77334 RADIATION TREATMENT AID(S): CPT | Performed by: RADIOLOGY

## 2024-06-11 PROCEDURE — 84703 CHORIONIC GONADOTROPIN ASSAY: CPT

## 2024-06-11 NOTE — TELEPHONE ENCOUNTER
Upon review of chart noted port placement scheduled  & IR bx scheduled .  Spoke w/Courtney,  IR , inquired on scheduling bx & port same day.  Courtney stated may schedule both procedures .    Name: Evangelina King  : 1980  MRN: [id number]    Oncology Navigation Follow-Up Note    Contact Type:  Radiation Oncology    Notes: Pt @ [facility] for teach/sim.  Accompanied Dr. Vinson in RO teaching room.  Dr. Vinson inquired on scheduling bx & port placement same day, pt agreeable.  Courtney updated, pt scheduled  to arrive @ [facility] outpatient surgery entrance @ 0730, NPO after midnight, no asa/motrin/ibuprofen, accompanied by adult , bring photo id & insurance card.  Pt verbalized understanding.  Pt inquired on IV iron ordered by Dr. Lomeli.  Instructed pt writer will contact [facility].  Pt denied further questions/concerns.  Encouraged to contact writer prn.  Spoke marlene/Junie, [facility] infusion nurse, inquired on iv iron.  Junie stated will contact  pre cert.  Will continue to follow.      Electronically signed by Jessa Skaggs RN on 2024 at 1:43 PM

## 2024-06-11 NOTE — DISCHARGE INSTRUCTIONS
Pelvis Side Effects  Diarrhea  Fatigue  Hair loss  Nausea and vomiting  Sexual and fertility changes  Skin changes  Urinary and bladder changes    Ways to Manage Diarrhea   When you have diarrhea:  Drink 8 to 12 cups of clear liquid per day. Severe diarrhea can cause your to become dehydrated, a problem that can become serious. This problem is caused by the loss of too much water from the body. Making sure you drink enough can help prevent it. If you drink liquids that are high in sugar (such as fruit juice, sweet iced tea, Kofi-Aid, or Hi-C) ask your nurse or dietitian if you should mix them with extra water.  Eat small meals and snacks. Many people find that they eat better if they eat 5 to 6 small means and snacks each day, rather than 3 large meals  Eat foods that are high in salts such as sodium and potassium. Your body can lose these salts when you have diarrhea, and it is important to replace them. Foods that are high in sodium or potassium include bananas, oranges, peach and apricot nectar, and boiled or mashed potatoes.   Eat low-fiber foods. Foods that are high in fiber can make diarrhea worse. Low-fiber foods include bananas, white rice, white toast, and plan or vanilla yogurt.  Take care of your rectal area. Instead of toilet paper, use a baby wipe or squirt water from a spray bottle to clean yourself after bowel movements. Also, ask your nurse about taking sitz baths, which is a warm-water bath taken in a sitting position that covers only the hips and buttocks. Be sure to tell your doctor or nurse if you rectal area gets sore.  Avoid:  Beer, wine, and other types of alcohol  Milk and dairy foods, such as ice cream, sour cream, and cheese  Spicy foods, such as hot sauce, salsa, chili, and reynolds dishes  Foods or dinks with caffeine, such as regular coffee, black tea, soda and chocolate  Foods or drinks that cause gas, such as cooked dried beans, cabbage, broccoli, soy milk, and other soy products   Foods

## 2024-06-11 NOTE — PROGRESS NOTES
Radiation Treatment Site/Plan/Fractions:  Pelvis/Fractionation to be determined after biopsy scheduled for 6/19/24      Concurrent Chemotherapy/Immunotherapy:  To be determined after results of biopsy      Cardiac Device:  None      Transportation Concerns:  None      Nursing Referrals and Reasons:  Previous referral to  for financial concerns      Contrast Given:  Isovue 370-91 mls via left foream 20G IV with good blood return.  Patient tolerated well.            Miscellaneous Information:  Site specific side effects reviewed with patient.  She has bilateral nephrostomy tubes.  Scheduled for port and biopsy 6/19/24.  Per Dr. Vinson, await results of biopsy to determine plan of care of RT alone versus chemo/RT.  Patient displays anxiousness related to diagnosis and plan of care.  Support and active listening offered.

## 2024-06-12 ENCOUNTER — CARE COORDINATION (OUTPATIENT)
Dept: CASE MANAGEMENT | Age: 44
End: 2024-06-12

## 2024-06-12 NOTE — CARE COORDINATION
Care Transitions Note  Follow Up Call     Patient Current Location:  Home: [address] [place] [address]    Care Transition Nurse contacted the patient by telephone. Verified name and  as identifiers.    Additional needs identified to be addressed with provider   No needs identified                 Method of communication with provider: none.    Care Summary Note: Spoke with patient today for transitional follow up call. She is feeling ok today, seems to be in a little better spirits this week.   Last week, patient was scheduled her biopsy and port placement but I was told it had to be done as 2 different procedures.  Per notes, her oncology navigator called on Monday and is now scheduled for both on the  of this month.  Patient had her mapping done yesterday at oncology office.  She is going to get iron infusions, these will begin soon, she states that they plan on giving her higher doses of iron for first 3 doses.  Patient had a bm this week, had not had one for about a week and was able to go.  She has only been dribbling some when using the bathroom but the day she had bm, she had a slow stream but was going a little more.  We did discuss that once radiation gets going and tumor shrinks, hopefully she will be able to have some normal urinary function.  She has bilateral nephrostomy tubes in, no issues, both draining urine, no f/c, n/v or other s/s of infection.  I did request from urology office dressing supplies for nephrostomy tubes.  Patient's cousin who is RN did change dressing for her once but will need more supplies. Unfortunately there have been no orders placed yet for supplies by urology and office currently closed.  Will reach back out to them tomorrow to see about sending over orders.  In the meantime, will try to reach out to her oncology navigator to see if she can assist obtaining dressing supplies for patient for her nephrostomy tubes.  She has pain to pelvic area, more pain towards

## 2024-06-14 LAB
DNA RANGE(S) EXAMINED NAR: NORMAL
GENE DIS ANL INTERP-IMP: POSITIVE
GENE DIS ASSESSED: NORMAL
GENE MUT TESTED BLD/T: 27.9 M/MB
MSI CA SPEC-IMP: NORMAL
PD-L1 BY 22C3 TISS IMSTN DOC: NEGATIVE
REASON FOR STUDY: NORMAL
TEMPUS AMENDMENTNOTE1: NORMAL
TEMPUS FUSIONADDENDUM: NORMAL
TEMPUS LCA: NORMAL
TEMPUS PD-L1 (22C3) COMBINED POSITIVE SCORE: <1
TEMPUS PD-L1 (22C3) TUMOR PROPORTION SCORE: <1 %
TEMPUS PORTAL: NORMAL
TEMPUS THERAPY1: NORMAL
TEMPUS THERAPYCOUNT: 1
TEMPUS TRIALCOUNT: 3
TEMPUS TRIALMATCHES1: NORMAL
TEMPUS TRIALMATCHES2: NORMAL
TEMPUS TRIALMATCHES3: NORMAL

## 2024-06-17 ENCOUNTER — HOSPITAL ENCOUNTER (OUTPATIENT)
Dept: INFUSION THERAPY | Age: 44
Discharge: HOME | End: 2024-06-17

## 2024-06-17 VITALS
DIASTOLIC BLOOD PRESSURE: 79 MMHG | HEART RATE: 97 BPM | OXYGEN SATURATION: 98 % | TEMPERATURE: 97 F | RESPIRATION RATE: 16 BRPM | SYSTOLIC BLOOD PRESSURE: 146 MMHG

## 2024-06-17 DIAGNOSIS — E61.1 IRON DEFICIENCY: Primary | ICD-10-CM

## 2024-06-17 PROCEDURE — 6360000002 HC RX W HCPCS: Performed by: INTERNAL MEDICINE

## 2024-06-17 PROCEDURE — 96366 THER/PROPH/DIAG IV INF ADDON: CPT

## 2024-06-17 PROCEDURE — 2580000003 HC RX 258: Performed by: INTERNAL MEDICINE

## 2024-06-17 PROCEDURE — 96365 THER/PROPH/DIAG IV INF INIT: CPT

## 2024-06-17 RX ORDER — DIPHENHYDRAMINE HYDROCHLORIDE 50 MG/ML
50 INJECTION INTRAMUSCULAR; INTRAVENOUS
Status: CANCELLED | OUTPATIENT
Start: 2024-07-01

## 2024-06-17 RX ORDER — SODIUM CHLORIDE 9 MG/ML
INJECTION, SOLUTION INTRAVENOUS CONTINUOUS
Status: CANCELLED | OUTPATIENT
Start: 2024-07-01

## 2024-06-17 RX ORDER — ALBUTEROL SULFATE 90 UG/1
4 AEROSOL, METERED RESPIRATORY (INHALATION) PRN
Status: CANCELLED | OUTPATIENT
Start: 2024-07-01

## 2024-06-17 RX ORDER — SODIUM CHLORIDE 9 MG/ML
5-250 INJECTION, SOLUTION INTRAVENOUS PRN
Status: CANCELLED | OUTPATIENT
Start: 2024-07-01

## 2024-06-17 RX ORDER — DIPHENHYDRAMINE HYDROCHLORIDE 50 MG/ML
25 INJECTION INTRAMUSCULAR; INTRAVENOUS ONCE
Status: CANCELLED | OUTPATIENT
Start: 2024-06-17 | End: 2024-06-17

## 2024-06-17 RX ORDER — ACETAMINOPHEN 325 MG/1
650 TABLET ORAL ONCE
Status: CANCELLED | OUTPATIENT
Start: 2024-07-01 | End: 2024-07-01

## 2024-06-17 RX ORDER — SODIUM CHLORIDE 9 MG/ML
5-250 INJECTION, SOLUTION INTRAVENOUS PRN
Status: CANCELLED | OUTPATIENT
Start: 2024-06-17

## 2024-06-17 RX ORDER — ONDANSETRON 2 MG/ML
8 INJECTION INTRAMUSCULAR; INTRAVENOUS
Status: CANCELLED | OUTPATIENT
Start: 2024-07-01

## 2024-06-17 RX ORDER — ACETAMINOPHEN 325 MG/1
650 TABLET ORAL
Status: CANCELLED | OUTPATIENT
Start: 2024-06-17

## 2024-06-17 RX ORDER — SODIUM CHLORIDE 0.9 % (FLUSH) 0.9 %
5-40 SYRINGE (ML) INJECTION PRN
Status: CANCELLED | OUTPATIENT
Start: 2024-07-01

## 2024-06-17 RX ORDER — DIPHENHYDRAMINE HYDROCHLORIDE 50 MG/ML
50 INJECTION INTRAMUSCULAR; INTRAVENOUS
Status: CANCELLED | OUTPATIENT
Start: 2024-06-17

## 2024-06-17 RX ORDER — ACETAMINOPHEN 325 MG/1
650 TABLET ORAL
Status: CANCELLED | OUTPATIENT
Start: 2024-07-01

## 2024-06-17 RX ORDER — EPINEPHRINE 1 MG/ML
0.3 INJECTION, SOLUTION, CONCENTRATE INTRAVENOUS PRN
Status: CANCELLED | OUTPATIENT
Start: 2024-07-01

## 2024-06-17 RX ORDER — SODIUM CHLORIDE 9 MG/ML
INJECTION, SOLUTION INTRAVENOUS CONTINUOUS
Status: CANCELLED | OUTPATIENT
Start: 2024-06-17

## 2024-06-17 RX ORDER — SODIUM CHLORIDE 9 MG/ML
5-250 INJECTION, SOLUTION INTRAVENOUS PRN
Status: DISCONTINUED | OUTPATIENT
Start: 2024-06-17 | End: 2024-06-18 | Stop reason: HOSPADM

## 2024-06-17 RX ORDER — ONDANSETRON 2 MG/ML
8 INJECTION INTRAMUSCULAR; INTRAVENOUS
Status: CANCELLED | OUTPATIENT
Start: 2024-06-17

## 2024-06-17 RX ORDER — HEPARIN 100 UNIT/ML
500 SYRINGE INTRAVENOUS PRN
Status: CANCELLED | OUTPATIENT
Start: 2024-07-01

## 2024-06-17 RX ORDER — HEPARIN 100 UNIT/ML
500 SYRINGE INTRAVENOUS PRN
Status: CANCELLED | OUTPATIENT
Start: 2024-06-17

## 2024-06-17 RX ORDER — ACETAMINOPHEN 325 MG/1
650 TABLET ORAL ONCE
Status: CANCELLED | OUTPATIENT
Start: 2024-06-17 | End: 2024-06-17

## 2024-06-17 RX ORDER — EPINEPHRINE 1 MG/ML
0.3 INJECTION, SOLUTION, CONCENTRATE INTRAVENOUS PRN
Status: CANCELLED | OUTPATIENT
Start: 2024-06-17

## 2024-06-17 RX ORDER — SODIUM CHLORIDE 0.9 % (FLUSH) 0.9 %
5-40 SYRINGE (ML) INJECTION PRN
Status: CANCELLED | OUTPATIENT
Start: 2024-06-17

## 2024-06-17 RX ORDER — ALBUTEROL SULFATE 90 UG/1
4 AEROSOL, METERED RESPIRATORY (INHALATION) PRN
Status: CANCELLED | OUTPATIENT
Start: 2024-06-17

## 2024-06-17 RX ORDER — DIPHENHYDRAMINE HYDROCHLORIDE 50 MG/ML
25 INJECTION INTRAMUSCULAR; INTRAVENOUS ONCE
Status: CANCELLED | OUTPATIENT
Start: 2024-07-01 | End: 2024-07-01

## 2024-06-17 RX ADMIN — SODIUM CHLORIDE 30 ML/HR: 9 INJECTION, SOLUTION INTRAVENOUS at 10:31

## 2024-06-17 RX ADMIN — SODIUM CHLORIDE 300 MG: 9 INJECTION, SOLUTION INTRAVENOUS at 11:10

## 2024-06-17 ASSESSMENT — PAIN SCALES - GENERAL: PAINLEVEL_OUTOF10: 3

## 2024-06-17 ASSESSMENT — PAIN DESCRIPTION - ORIENTATION: ORIENTATION: LOWER

## 2024-06-17 ASSESSMENT — PAIN DESCRIPTION - DESCRIPTORS: DESCRIPTORS: PRESSURE

## 2024-06-17 ASSESSMENT — PAIN DESCRIPTION - LOCATION: LOCATION: ABDOMEN

## 2024-06-17 NOTE — PROGRESS NOTES
Patient arrived for Venofer infusion.  VSS as charted. IV placed without complication.  Patient tolerated infusion well.  Patient left infusion center with all belongings and steady gate.  New appt set for the next two infusions.

## 2024-06-18 ENCOUNTER — CARE COORDINATION (OUTPATIENT)
Dept: CARE COORDINATION | Age: 44
End: 2024-06-18

## 2024-06-18 NOTE — CARE COORDINATION
Care Transitions Note  Follow Up Call     Patient Current Location:  Home: [address] [place] [address]    Care Transition Nurse contacted the patient by telephone. Verified name and  as identifiers.    Additional needs identified to be addressed with provider   Standard priority: Patient reporting having clear vaginal discharge, has little bit of foul odor as well as a medicinal odor to it.                  Method of communication with provider: chart routing.    Care Summary Note: Spoke with patient today for transitional follow up call. Patient is doing ok this week, was on a call regarding her  and was awaiting them to call her back.  Patient did get call back so was unable to complete assessment.  I called patient back again late afternoon, she had went to  her  who was discharged and brought him home.  Patient is currently being worked up for cervical cancer.  She has a biopsy that is scheduled for tomorrow on lymph nodes that were active on PET. She is nervous about upcoming procedure, has to have port placed tomorrow as well and will be done in IR at same time of biopsy.  She has not been having a whole lot of pain this week, she said her nephrostomy tubes no longer hurt but are more annoying.  She has bilateral nephrostomy tubes in place and unfortunately I have not been able to secure any dressings for her for dressing changes.  She did not get any instruction on aftercare of tubes and has had a family member who is a nurse look at them.  She has not had any s/s of infection but today tells me that one of the discs that are in place around the tubing is broken and just hanging there.  She has no supplies at home.  I had called to oncology office earlier today and had spoke with Jessa her oncology navigator who advised I speak with Dr. Armani Gunter's nurse. Lyric said that they can order for patient to go to wound care for dressing changes to nephrostomy tubes and give her some

## 2024-06-19 ENCOUNTER — HOSPITAL ENCOUNTER (OUTPATIENT)
Dept: INTERVENTIONAL RADIOLOGY/VASCULAR | Age: 44
Discharge: HOME | End: 2024-06-21

## 2024-06-19 ENCOUNTER — HOSPITAL ENCOUNTER (OUTPATIENT)
Dept: ULTRASOUND IMAGING | Age: 44
Discharge: HOME | End: 2024-06-21

## 2024-06-19 VITALS
OXYGEN SATURATION: 100 % | TEMPERATURE: 97.8 F | HEART RATE: 98 BPM | SYSTOLIC BLOOD PRESSURE: 120 MMHG | RESPIRATION RATE: 20 BRPM | DIASTOLIC BLOOD PRESSURE: 78 MMHG

## 2024-06-19 VITALS
OXYGEN SATURATION: 100 % | RESPIRATION RATE: 16 BRPM | DIASTOLIC BLOOD PRESSURE: 90 MMHG | HEART RATE: 98 BPM | SYSTOLIC BLOOD PRESSURE: 140 MMHG

## 2024-06-19 DIAGNOSIS — C53.8 MALIGNANT NEOPLASM OF OVERLAPPING SITES OF CERVIX (HCC): ICD-10-CM

## 2024-06-19 DIAGNOSIS — C53.8 MALIGNANT NEOPLASM OF CERVICAL STUMP (HCC): ICD-10-CM

## 2024-06-19 PROBLEM — Z98.890 POST-OPERATIVE STATE: Status: RESOLVED | Noted: 2024-05-20 | Resolved: 2024-06-19

## 2024-06-19 LAB
INR PPP: 1
PARTIAL THROMBOPLASTIN TIME: 34.2 SEC (ref 23–36.5)
PROTHROMBIN TIME: 12.8 SEC (ref 11.7–14.9)

## 2024-06-19 PROCEDURE — 88305 TISSUE EXAM BY PATHOLOGIST: CPT

## 2024-06-19 PROCEDURE — 77001 FLUOROGUIDE FOR VEIN DEVICE: CPT

## 2024-06-19 PROCEDURE — 6360000002 HC RX W HCPCS: Performed by: RADIOLOGY

## 2024-06-19 PROCEDURE — 88342 IMHCHEM/IMCYTCHM 1ST ANTB: CPT

## 2024-06-19 PROCEDURE — 88334 PATH CONSLTJ SURG CYTO XM EA: CPT

## 2024-06-19 PROCEDURE — 85730 THROMBOPLASTIN TIME PARTIAL: CPT

## 2024-06-19 PROCEDURE — 88341 IMHCHEM/IMCYTCHM EA ADD ANTB: CPT

## 2024-06-19 PROCEDURE — 2580000003 HC RX 258: Performed by: RADIOLOGY

## 2024-06-19 PROCEDURE — 99153 MOD SED SAME PHYS/QHP EA: CPT

## 2024-06-19 PROCEDURE — 99152 MOD SED SAME PHYS/QHP 5/>YRS: CPT

## 2024-06-19 PROCEDURE — 36561 INSERT TUNNELED CV CATH: CPT

## 2024-06-19 PROCEDURE — C1788 PORT, INDWELLING, IMP: HCPCS

## 2024-06-19 PROCEDURE — 76937 US GUIDE VASCULAR ACCESS: CPT

## 2024-06-19 PROCEDURE — 85610 PROTHROMBIN TIME: CPT

## 2024-06-19 PROCEDURE — 76942 ECHO GUIDE FOR BIOPSY: CPT

## 2024-06-19 PROCEDURE — 88333 PATH CONSLTJ SURG CYTO XM 1: CPT

## 2024-06-19 PROCEDURE — 85049 AUTOMATED PLATELET COUNT: CPT

## 2024-06-19 RX ORDER — SODIUM CHLORIDE 9 MG/ML
INJECTION, SOLUTION INTRAVENOUS CONTINUOUS
Status: DISCONTINUED | OUTPATIENT
Start: 2024-06-19 | End: 2024-06-22 | Stop reason: HOSPADM

## 2024-06-19 RX ORDER — ACETAMINOPHEN 325 MG/1
650 TABLET ORAL EVERY 4 HOURS PRN
Status: DISCONTINUED | OUTPATIENT
Start: 2024-06-19 | End: 2024-06-22 | Stop reason: HOSPADM

## 2024-06-19 RX ORDER — FENTANYL CITRATE 50 UG/ML
INJECTION, SOLUTION INTRAMUSCULAR; INTRAVENOUS PRN
Status: DISCONTINUED | OUTPATIENT
Start: 2024-06-19 | End: 2024-06-22 | Stop reason: HOSPADM

## 2024-06-19 RX ORDER — MIDAZOLAM HYDROCHLORIDE 2 MG/2ML
INJECTION, SOLUTION INTRAMUSCULAR; INTRAVENOUS PRN
Status: DISCONTINUED | OUTPATIENT
Start: 2024-06-19 | End: 2024-06-22 | Stop reason: HOSPADM

## 2024-06-19 RX ORDER — HEPARIN 100 UNIT/ML
5 SYRINGE INTRAVENOUS PRN
Status: DISCONTINUED | OUTPATIENT
Start: 2024-06-19 | End: 2024-06-22 | Stop reason: HOSPADM

## 2024-06-19 RX ORDER — HEPARIN 100 UNIT/ML
SYRINGE INTRAVENOUS PRN
Status: DISCONTINUED | OUTPATIENT
Start: 2024-06-19 | End: 2024-06-22 | Stop reason: HOSPADM

## 2024-06-19 RX ADMIN — MIDAZOLAM HYDROCHLORIDE 1 MG: 1 INJECTION, SOLUTION INTRAMUSCULAR; INTRAVENOUS at 10:30

## 2024-06-19 RX ADMIN — FENTANYL CITRATE 50 MCG: 50 INJECTION, SOLUTION INTRAMUSCULAR; INTRAVENOUS at 10:26

## 2024-06-19 RX ADMIN — HEPARIN 5 ML: 100 SYRINGE at 11:55

## 2024-06-19 RX ADMIN — FENTANYL CITRATE 50 MCG: 50 INJECTION, SOLUTION INTRAMUSCULAR; INTRAVENOUS at 11:37

## 2024-06-19 RX ADMIN — CEFAZOLIN 1000 MG: 1 INJECTION, POWDER, FOR SOLUTION INTRAMUSCULAR; INTRAVENOUS at 11:11

## 2024-06-19 RX ADMIN — MIDAZOLAM HYDROCHLORIDE 1 MG: 1 INJECTION, SOLUTION INTRAMUSCULAR; INTRAVENOUS at 11:37

## 2024-06-19 ASSESSMENT — PAIN SCALES - GENERAL: PAINLEVEL_OUTOF10: 3

## 2024-06-19 ASSESSMENT — PAIN - FUNCTIONAL ASSESSMENT
PAIN_FUNCTIONAL_ASSESSMENT: 0-10

## 2024-06-19 NOTE — POST SEDATION
Sedation Post Procedure Note    Patient Name: Evangelina King   YOB: 1980  Room/Bed: Room/bed info not found  Medical Record Number: [id number]  Date: 6/19/2024   Time: 12:22 PM         Physicians/Assistants: SYLVIA Callahan MD    Procedure Performed:  Port    Post-Sedation Vital Signs:  Vitals:    06/19/24 1047   BP: (!) 140/90   Pulse: 98   Resp: 16   SpO2: 100%      Vital signs were reviewed and were stable after the procedure (see flow sheet for vitals)            Post-Sedation Exam: Pt remains stable           Complications: none    Electronically signed by SYLVIA Callahan on 6/19/2024 at 12:22 PM

## 2024-06-19 NOTE — PROGRESS NOTES
Here for left inguinal LN biopsy. Consent done. Labs noted. Dr Butterfield present. Left groin is prepped, draped and numbed. Cytology here and core specimens x3 passed off. Post scan done. Dressing on. Now to IR for port.

## 2024-06-19 NOTE — OR NURSING
Report called. Patient to SBAR. Tolerated well. Extra bags and stayfix given to patient for bilateral PCNT.

## 2024-06-19 NOTE — BRIEF OP NOTE
Brief Postoperative Note for Port Placement    Evangelina King  YOB: 1980  [id number]    Pre-operative Diagnosis: Cervical CA      Post-operative Diagnosis: Same    Procedure: 8 Fr Port Placement    Medication Given: fentanyl and versed    Anesthesia: 1 %Lidocaine, 1% Lidocaine w/ Epi    Surgeons/Assistants: MD Scout and SYLVIA Quiroga    Estimated Blood Loss: Minimal    Complications: none    Specimen Removal: None    8 Fr Port placement into the Right IJ. Port flushed with heparin.  Incision site closed with Vicryl sutures and tissue adhesive. Vital signs were reviewed and were stable after the procedure. Patient tolerated the procedure well.    Electronically signed by SYLVIA Quiroga PA-C on 6/19/2024 at 12:21 PM

## 2024-06-19 NOTE — H&P
History and Physical    Pt Name: Evangelina King  MRN: [id number]  YOB: 1980  Date of evaluation: 6/19/2024    SUBJECTIVE:   History of Chief Complaint:    Patient presents preprocedure for port placement, inguinal lymph node biopsy.  She has been diagnosed with pelvic/endometrial mass, underwent cystoscopy and D & C, bilateral nephrostomy tube placement.  She says that she has an abnormal lymph node so has been scheduled for biopsy today.  She says that regardless, she will start chemoradiation.  She reports abdominal discomfort, pressure sensation.  Past Medical History    has a past medical history of Cancer (HCC), Obstructive uropathy, and Uterine mass.  Past Surgical History   has a past surgical history that includes Cholecystectomy; Dilation and curettage of uterus (05/20/2024); Cystoscopy w/ retrogrades (05/20/2024); Dilation and curettage of uterus (N/A, 05/20/2024); Cystoscopy (N/A, 05/20/2024); IR GUIDED NEPHROSTOMY CATH PLACEMENT LEFT (05/21/2024); IR GUIDED NEPHROSTOMY CATH PLACEMENT RIGHT (05/21/2024); and Hysterectomy.  Medications  Prior to Admission medications    Medication Sig Start Date End Date Taking? Authorizing Provider   NONFORMULARY THC gummies as needed    Provider, MD Swathi   gabapentin (NEURONTIN) 300 MG capsule Take 1 capsule by mouth 3 times daily as needed (pain) for up to 60 days. 6/10/24 8/9/24  Arlene Penn APRN - CNP   traMADol (ULTRAM) 50 MG tablet Take 1 tablet by mouth every 6 hours as needed for Pain (take 1 pill every 6 hours) for up to 30 days. Max Daily Amount: 200 mg 6/10/24 7/10/24  Arlene Penn APRN - CNP   cyclobenzaprine (FLEXERIL) 10 MG tablet Take 1 tablet by mouth 3 times daily as needed for Muscle spasms 6/10/24 9/8/24  Arlene Penn APRN - CNP   sertraline (ZOLOFT) 25 MG tablet Take 1 tablet by mouth daily 5/22/24   Mercy Villatoro DO   lidocaine 4 % external patch Place 1 patch onto the skin daily 5/22/24 6/21/24  Shauna

## 2024-06-19 NOTE — PRE SEDATION
Sedation Pre-Procedure Note    Patient Name: Evangelina King   YOB: 1980  Room/Bed: Room/bed info not found  Medical Record Number: [id number]  Date: 6/19/2024   Time: 9:55 AM       Indication:  Port    Consent: I have discussed with the patient and/or the patient representative the indication, alternatives, and the possible risks and/or complications of the planned procedure and the anesthesia methods. The patient and/or patient representative appear to understand and agree to proceed.    Vital Signs:   Vitals:    06/19/24 0918   BP: (!) 136/6   Temp: 97.8 °F (36.6 °C)   SpO2: 99%       Past Medical History:   has a past medical history of Cancer (HCC), Obstructive uropathy, and Uterine mass.    Past Surgical History:   has a past surgical history that includes Cholecystectomy; Dilation and curettage of uterus (05/20/2024); Cystoscopy w/ retrogrades (05/20/2024); Dilation and curettage of uterus (N/A, 05/20/2024); Cystoscopy (N/A, 05/20/2024); IR GUIDED NEPHROSTOMY CATH PLACEMENT LEFT (05/21/2024); IR GUIDED NEPHROSTOMY CATH PLACEMENT RIGHT (05/21/2024); and Hysterectomy.    Medications:   Scheduled Meds:    ceFAZolin (ANCEF) IVPB  1,000 mg IntraVENous On Call to OR     Continuous Infusions:    sodium chloride       PRN Meds:   Home Meds:   Prior to Admission medications    Medication Sig Start Date End Date Taking? Authorizing Provider   NONFORMULARY THC gummies as needed    Provider, MD Swathi   gabapentin (NEURONTIN) 300 MG capsule Take 1 capsule by mouth 3 times daily as needed (pain) for up to 60 days. 6/10/24 8/9/24  Arlene Penn APRN - CNP   traMADol (ULTRAM) 50 MG tablet Take 1 tablet by mouth every 6 hours as needed for Pain (take 1 pill every 6 hours) for up to 30 days. Max Daily Amount: 200 mg 6/10/24 7/10/24  Arlene Penn APRN - CNP   cyclobenzaprine (FLEXERIL) 10 MG tablet Take 1 tablet by mouth 3 times daily as needed for Muscle spasms 6/10/24 9/8/24  Liliana Lopez

## 2024-06-21 ENCOUNTER — CARE COORDINATION (OUTPATIENT)
Dept: CARE COORDINATION | Age: 44
End: 2024-06-21

## 2024-06-21 NOTE — CARE COORDINATION
Care Transitions Note    Follow Up Call     Patient Current Location:  Home: [address] [place] [address]    Care Transition Nurse contacted the patient by telephone. Verified name and  as identifiers.    Additional needs identified to be addressed with provider   No needs identified                 Method of communication with provider: none.    Care Summary Note: Spoke with patient today for follow up transitional call. She is feeling ok today, denies any uncontrolled pain.  Wednesday she had her port placed and had biopsy of lymph nodes in IR.  Biopsy results on chart reviewed, patient was able to view results which were positive for carcinoma.  She reports where they did the biopsy, she has little to no pain to area.  Port site was painful at first but better today, denies any issues, no f/c, n/v, no drainage, states site looks fine today.  When patient came in to IR, I had told her to ask about dressing supplies for her nephrostomy tubes.  We have had no luck in obtaining any orders for dressing supplies.  Patient said she was given a whole bag of supplies by nurse in IR, she changed her bags as well and told her to make list of supplies needed for when she comes in for exchange and she will make sure she has what she needs.  She has appointment coming up on Monday, expressed that oncology may be calling her then to go over results/bring her in.    Patient is denying any new needs at this time before weekend.  She did let me know that her spouse did get discharged on Tuesday and is now home.  He is having some issues still now that he is home, patient feels he needs to return however their beds were full.  He will go for assessment with his doctor on Tuesday and if needed, may pink slip him then.  Patient has a lot of stressors at present with the new diagnosis, managing nephrostomy tubes, going to multiple appointments as well as dealing with her husbands medical issues as well.  Patient has done

## 2024-06-24 ENCOUNTER — HOSPITAL ENCOUNTER (OUTPATIENT)
Dept: INFUSION THERAPY | Age: 44
Discharge: HOME | End: 2024-06-24
Payer: COMMERCIAL

## 2024-06-24 ENCOUNTER — TELEPHONE (OUTPATIENT)
Dept: RADIATION ONCOLOGY | Age: 44
End: 2024-06-24

## 2024-06-24 VITALS
TEMPERATURE: 97.8 F | OXYGEN SATURATION: 99 % | HEART RATE: 100 BPM | RESPIRATION RATE: 20 BRPM | DIASTOLIC BLOOD PRESSURE: 96 MMHG | SYSTOLIC BLOOD PRESSURE: 146 MMHG

## 2024-06-24 DIAGNOSIS — E61.1 IRON DEFICIENCY: Primary | ICD-10-CM

## 2024-06-24 PROCEDURE — 96365 THER/PROPH/DIAG IV INF INIT: CPT

## 2024-06-24 PROCEDURE — 2580000003 HC RX 258: Performed by: INTERNAL MEDICINE

## 2024-06-24 PROCEDURE — 96366 THER/PROPH/DIAG IV INF ADDON: CPT

## 2024-06-24 PROCEDURE — 6360000002 HC RX W HCPCS: Performed by: INTERNAL MEDICINE

## 2024-06-24 RX ORDER — ACETAMINOPHEN 325 MG/1
650 TABLET ORAL
OUTPATIENT
Start: 2024-07-01

## 2024-06-24 RX ORDER — HEPARIN 100 UNIT/ML
500 SYRINGE INTRAVENOUS PRN
OUTPATIENT
Start: 2024-07-01

## 2024-06-24 RX ORDER — SODIUM CHLORIDE 0.9 % (FLUSH) 0.9 %
5-40 SYRINGE (ML) INJECTION PRN
OUTPATIENT
Start: 2024-07-01

## 2024-06-24 RX ORDER — SODIUM CHLORIDE 9 MG/ML
5-250 INJECTION, SOLUTION INTRAVENOUS PRN
OUTPATIENT
Start: 2024-07-01

## 2024-06-24 RX ORDER — ALBUTEROL SULFATE 90 UG/1
4 AEROSOL, METERED RESPIRATORY (INHALATION) PRN
OUTPATIENT
Start: 2024-07-01

## 2024-06-24 RX ORDER — SODIUM CHLORIDE 9 MG/ML
INJECTION, SOLUTION INTRAVENOUS CONTINUOUS
OUTPATIENT
Start: 2024-07-01

## 2024-06-24 RX ORDER — DIPHENHYDRAMINE HYDROCHLORIDE 50 MG/ML
50 INJECTION INTRAMUSCULAR; INTRAVENOUS
OUTPATIENT
Start: 2024-07-01

## 2024-06-24 RX ORDER — ONDANSETRON 2 MG/ML
8 INJECTION INTRAMUSCULAR; INTRAVENOUS
OUTPATIENT
Start: 2024-07-01

## 2024-06-24 RX ORDER — EPINEPHRINE 1 MG/ML
0.3 INJECTION, SOLUTION, CONCENTRATE INTRAVENOUS PRN
OUTPATIENT
Start: 2024-07-01

## 2024-06-24 RX ORDER — SODIUM CHLORIDE 9 MG/ML
5-250 INJECTION, SOLUTION INTRAVENOUS PRN
Status: DISCONTINUED | OUTPATIENT
Start: 2024-06-24 | End: 2024-06-25 | Stop reason: HOSPADM

## 2024-06-24 RX ORDER — SODIUM CHLORIDE 9 MG/ML
5-250 INJECTION, SOLUTION INTRAVENOUS PRN
Status: CANCELLED | OUTPATIENT
Start: 2024-07-01

## 2024-06-24 RX ADMIN — IRON SUCROSE 300 MG: 20 INJECTION, SOLUTION INTRAVENOUS at 11:24

## 2024-06-24 RX ADMIN — SODIUM CHLORIDE 30 ML/HR: 9 INJECTION, SOLUTION INTRAVENOUS at 11:24

## 2024-06-24 NOTE — PROGRESS NOTES
Patient arrived for infusion.  VSS as charted. IV placed without complication.  Minor difficulty with port.  Will access port next visit. Patient tolerated infusion well.  Patient left infusion center with all belongings and steady gate.  Next appt is scheduled for next week. Snack provided.

## 2024-06-24 NOTE — TELEPHONE ENCOUNTER
Reviewed results of biopsy completed on 6/19/24 with Dr. Vinson.  Dr. Vinson states he is now planning 10 Fractions of palliative radiation rather than concurrent chemo/RT.  Patient has virtual appt with Dr. Lomeli tomorrow to review results of this.  Updated radiation therapists as well.  Await outcome of her appt with Dr. Lomeli to finalize plan of care with him as well.

## 2024-06-25 ENCOUNTER — CARE COORDINATION (OUTPATIENT)
Dept: CARE COORDINATION | Age: 44
End: 2024-06-25

## 2024-06-25 ENCOUNTER — HOSPITAL ENCOUNTER (OUTPATIENT)
Dept: RADIATION ONCOLOGY | Age: 44
Discharge: HOME | End: 2024-06-25
Payer: COMMERCIAL

## 2024-06-25 ENCOUNTER — TELEMEDICINE (OUTPATIENT)
Dept: ONCOLOGY | Age: 44
End: 2024-06-25
Payer: COMMERCIAL

## 2024-06-25 DIAGNOSIS — C53.8 MALIGNANT NEOPLASM OF OVERLAPPING SITES OF CERVIX (HCC): Primary | ICD-10-CM

## 2024-06-25 DIAGNOSIS — E61.1 IRON DEFICIENCY: ICD-10-CM

## 2024-06-25 PROCEDURE — 99214 OFFICE O/P EST MOD 30 MIN: CPT | Performed by: INTERNAL MEDICINE

## 2024-06-25 PROCEDURE — 77301 RADIOTHERAPY DOSE PLAN IMRT: CPT | Performed by: RADIOLOGY

## 2024-06-25 PROCEDURE — 77338 DESIGN MLC DEVICE FOR IMRT: CPT | Performed by: RADIOLOGY

## 2024-06-25 PROCEDURE — 77300 RADIATION THERAPY DOSE PLAN: CPT | Performed by: RADIOLOGY

## 2024-06-25 NOTE — PROGRESS NOTES
information     Interim History:  Patient presents to clinic via virtual visit for follow-up.  Underwent inguinal lymph node biopsy which was positive.  Patient receiving IV iron.  Feels less anxious.    During this visit patient's allergy, social, medical, surgical history and medications were reviewed and updated.    Past Medical History:   Past Medical History:   Diagnosis Date    Cancer (HCC)     14 years ago per patient    Obstructive uropathy     Uterine mass      Past Surgical History:  Past Surgical History:   Procedure Laterality Date    CHOLECYSTECTOMY      CYSTOSCOPY N/A 05/20/2024    CYSTOSCOPY RETROGRADE PYELOGRAM, BLADDER BIOPSY performed by Ludin Barbosa MD at [facility] OR    CYSTOSCOPY W/ RETROGRADES  05/20/2024    CYSTOSCOPY RETROGRADE PYELOGRAM, BLADDER BIOPSY    DILATION AND CURETTAGE OF UTERUS  05/20/2024    DILATATION AND CURETTAGE HYSTEROSCOPY, VAGINAL / CERVIX. BIOPSY    DILATION AND CURETTAGE OF UTERUS N/A 05/20/2024    DILATATION AND CURETTAGE HYSTEROSCOPY, VAGINAL / CERVIX. BIOPSY performed by Jewell Clayton MD at [facility] OR    HYSTERECTOMY (CERVIX STATUS UNKNOWN)      IR NEPHROSTOMY PERCUTANEOUS LEFT  05/21/2024    IR NEPHROSTOMY PERCUTANEOUS LEFT 5/21/2024 Daniel Gonsalez MD [facility] SPECIAL PROCEDURES    IR NEPHROSTOMY PERCUTANEOUS RIGHT  05/21/2024    IR NEPHROSTOMY PERCUTANEOUS RIGHT 5/21/2024 Daniel Gonsalez MD [facility] PROCED[facility]    IR PORT PLACEMENT EQUAL OR GREATER THAN 5 YEARS  6/19/2024    IR PORT PLACEMENT EQUAL OR GREATER THAN 5 YEARS 6/19/2024 Ishan Butterfield MD [facility] SPECIAL PROCEDURES    US LYMPH NODE BIOPSY  6/19/2024    US LYMPH NODE BIOPSY 6/19/2024 [facility] ULTRASOUND       Patient Family Social History:  Family History   Adopted: Yes   Problem Relation Age of Onset    Lung Cancer Maternal Grandfather     Breast Cancer Other     Cancer Maternal Uncle      Social History     Socioeconomic History    Marital status:    Occupational History

## 2024-06-25 NOTE — PATIENT INSTRUCTIONS
Keytruda added to the regimen.  Need prior authorization    Start patient on systemic therapy once done with radiation.  Return visit cycle 1 day 1

## 2024-06-27 ENCOUNTER — CLINICAL DOCUMENTATION (OUTPATIENT)
Facility: HOSPITAL | Age: 44
End: 2024-06-27

## 2024-06-27 ENCOUNTER — TELEPHONE (OUTPATIENT)
Dept: ONCOLOGY | Age: 44
End: 2024-06-27

## 2024-06-27 NOTE — PROGRESS NOTES
Patient Assistance    Writer spoke with patient on telephone. Verbal consent given to enroll patient in [facility] copay assistance.

## 2024-06-27 NOTE — TELEPHONE ENCOUNTER
Name: Evangelina King  : 1980  MRN: [id number]    Oncology Navigation Follow-Up Note    Contact Type:  Telephone    Notes: Upon review of chart noted pt scheduled for palliative XRT start  & last XRT 7/15.  Noted chemo/immunotherapy PA pending.  [facility], updated on palliative XRT dates & requested chemo/immunotherapy PA completed.  Spoke w/pt for f/u call.  Pt inquired on start date for chemo/immunotherapy.  Instructed pt writer will speak w/Dr. Lomeli upon arrival to [facility] tomorrow & call w/update.  Pt verbalized understanding & denied questions/concerns.  Encouraged to contact writer prn.  Will continue to follow.      Electronically signed by Jessa Skaggs RN on 2024 at 1:07 PM

## 2024-06-28 ENCOUNTER — CARE COORDINATION (OUTPATIENT)
Dept: CARE COORDINATION | Age: 44
End: 2024-06-28

## 2024-06-28 ENCOUNTER — TELEPHONE (OUTPATIENT)
Dept: ONCOLOGY | Age: 44
End: 2024-06-28

## 2024-06-28 NOTE — TELEPHONE ENCOUNTER
Name: Evangelina King  : 1980  MRN: [id number]    Oncology Navigation Follow-Up Note    Contact Type:  Telephone    Notes: Dr. Lomeli updated on XRT dates & inquired on chemotherapy/immunotherapy start date.  Dr. Lomeli requested pt scheduled  for f/u & C1 chemotherapy/immunotherapy.  Pt updated on Dr. Lomeli's request.  Pt verbalized understanding & denied questions/concerns.  Encouraged to contact writer prn.  Will continue to follow.      Electronically signed by Jessa Skaggs RN on 2024 at 11:44 AM

## 2024-06-28 NOTE — CARE COORDINATION
Care Transitions Note    Follow Up Call     Patient Current Location:  Home: [address] [place] [address]    Care Transition Nurse contacted the patient by telephone. Verified name and  as identifiers.    Additional needs identified to be addressed with provider   No needs identified                 Method of communication with provider: none.    Care Summary Note: Spoke with patient today, she said she is doing ok today, no new issues.  She has finally gotten her POC and treatment plan with oncology on the books and will start her first radiation therapy session this coming .  Patient will receive radiation through till the  and they plan to start chemo the week of .  Patient has had her port placed, she is getting iron infusions through port, had issue with not being able to flush last time but will be checking this again at next iron infusion on Monday.  Pelvic pain tolerable.  She is eating and drinking ok.  She had some issues with her health insurance and coverage for her chemo, this was straightened out late yesterday.    Advised upcoming week patient will be having several appointments, will plan on calling her in about 10 days to follow up.  Expressed if she has ANY needs, questions or concerns before I reach out to reach back out to me.      Plan of care updates since last contact:  Education: Review of upcoming treatments with radiation       Advance Care Planning:   Does patient have an Advance Directive: reviewed during previous call, see note. .    Medication Review:  Full medication reconciliation completed during previous call.    Remote Patient Monitoring:  Offered patient enrollment in the Remote Patient Monitoring (RPM) program for in-home monitoring: Patient is not eligible for RPM program because: patient does not have qualifying diagnosis.    Assessments:  Care Transitions Subsequent and Final Call    Schedule Follow Up Appointment with PCP: Completed  Subsequent

## 2024-06-30 ENCOUNTER — HOSPITAL ENCOUNTER (OUTPATIENT)
Dept: RADIATION ONCOLOGY | Age: 44
Discharge: HOME | End: 2024-06-30
Payer: COMMERCIAL

## 2024-06-30 PROCEDURE — 77386 HC NTSTY MODUL RAD TX DLVR CPLX: CPT | Performed by: RADIOLOGY

## 2024-07-01 ENCOUNTER — HOSPITAL ENCOUNTER (OUTPATIENT)
Dept: RADIATION ONCOLOGY | Age: 44
Discharge: HOME | End: 2024-07-01
Payer: COMMERCIAL

## 2024-07-01 ENCOUNTER — HOSPITAL ENCOUNTER (OUTPATIENT)
Dept: INFUSION THERAPY | Age: 44
End: 2024-07-01

## 2024-07-01 LAB
SEND OUT REPORT: NORMAL
TEST NAME: NORMAL

## 2024-07-01 PROCEDURE — 77386 HC NTSTY MODUL RAD TX DLVR CPLX: CPT | Performed by: RADIOLOGY

## 2024-07-02 ENCOUNTER — APPOINTMENT (OUTPATIENT)
Dept: RADIATION ONCOLOGY | Age: 44
End: 2024-07-02
Payer: COMMERCIAL

## 2024-07-02 ENCOUNTER — HOSPITAL ENCOUNTER (OUTPATIENT)
Dept: INFUSION THERAPY | Age: 44
Discharge: HOME | End: 2024-07-02
Payer: COMMERCIAL

## 2024-07-02 VITALS
HEART RATE: 103 BPM | SYSTOLIC BLOOD PRESSURE: 126 MMHG | TEMPERATURE: 97.3 F | RESPIRATION RATE: 16 BRPM | DIASTOLIC BLOOD PRESSURE: 75 MMHG | OXYGEN SATURATION: 98 %

## 2024-07-02 DIAGNOSIS — E61.1 IRON DEFICIENCY: Primary | ICD-10-CM

## 2024-07-02 DIAGNOSIS — C53.9 PRIMARY CERVICAL SQUAMOUS CELL CARCINOMA (HCC): ICD-10-CM

## 2024-07-02 PROCEDURE — 6360000002 HC RX W HCPCS: Performed by: INTERNAL MEDICINE

## 2024-07-02 PROCEDURE — 2580000003 HC RX 258: Performed by: INTERNAL MEDICINE

## 2024-07-02 PROCEDURE — 96366 THER/PROPH/DIAG IV INF ADDON: CPT

## 2024-07-02 PROCEDURE — 96365 THER/PROPH/DIAG IV INF INIT: CPT

## 2024-07-02 RX ORDER — SODIUM CHLORIDE 0.9 % (FLUSH) 0.9 %
5-40 SYRINGE (ML) INJECTION PRN
OUTPATIENT
Start: 2024-07-15

## 2024-07-02 RX ORDER — HEPARIN 100 UNIT/ML
500 SYRINGE INTRAVENOUS PRN
Status: DISCONTINUED | OUTPATIENT
Start: 2024-07-02 | End: 2024-07-03 | Stop reason: HOSPADM

## 2024-07-02 RX ORDER — HEPARIN 100 UNIT/ML
500 SYRINGE INTRAVENOUS PRN
OUTPATIENT
Start: 2024-07-15

## 2024-07-02 RX ORDER — HEPARIN 100 UNIT/ML
500 SYRINGE INTRAVENOUS PRN
OUTPATIENT
Start: 2024-07-02

## 2024-07-02 RX ORDER — SODIUM CHLORIDE 9 MG/ML
5-250 INJECTION, SOLUTION INTRAVENOUS PRN
Status: DISCONTINUED | OUTPATIENT
Start: 2024-07-02 | End: 2024-07-03 | Stop reason: HOSPADM

## 2024-07-02 RX ORDER — ACETAMINOPHEN 325 MG/1
650 TABLET ORAL
OUTPATIENT
Start: 2024-07-15

## 2024-07-02 RX ORDER — ONDANSETRON 2 MG/ML
8 INJECTION INTRAMUSCULAR; INTRAVENOUS
OUTPATIENT
Start: 2024-07-15

## 2024-07-02 RX ORDER — SODIUM CHLORIDE 9 MG/ML
5-250 INJECTION, SOLUTION INTRAVENOUS PRN
OUTPATIENT
Start: 2024-07-15

## 2024-07-02 RX ORDER — DIPHENHYDRAMINE HYDROCHLORIDE 50 MG/ML
50 INJECTION INTRAMUSCULAR; INTRAVENOUS
OUTPATIENT
Start: 2024-07-15

## 2024-07-02 RX ORDER — SODIUM CHLORIDE 9 MG/ML
INJECTION, SOLUTION INTRAVENOUS CONTINUOUS
OUTPATIENT
Start: 2024-07-15

## 2024-07-02 RX ORDER — SODIUM CHLORIDE 0.9 % (FLUSH) 0.9 %
5-40 SYRINGE (ML) INJECTION PRN
OUTPATIENT
Start: 2024-07-02

## 2024-07-02 RX ORDER — SODIUM CHLORIDE 9 MG/ML
25 INJECTION, SOLUTION INTRAVENOUS PRN
OUTPATIENT
Start: 2024-07-02

## 2024-07-02 RX ORDER — ALBUTEROL SULFATE 90 UG/1
4 AEROSOL, METERED RESPIRATORY (INHALATION) PRN
OUTPATIENT
Start: 2024-07-15

## 2024-07-02 RX ORDER — EPINEPHRINE 1 MG/ML
0.3 INJECTION, SOLUTION, CONCENTRATE INTRAVENOUS PRN
OUTPATIENT
Start: 2024-07-15

## 2024-07-02 RX ORDER — SODIUM CHLORIDE 0.9 % (FLUSH) 0.9 %
5-40 SYRINGE (ML) INJECTION PRN
Status: DISCONTINUED | OUTPATIENT
Start: 2024-07-02 | End: 2024-07-03 | Stop reason: HOSPADM

## 2024-07-02 RX ADMIN — HEPARIN 500 UNITS: 100 SYRINGE at 15:09

## 2024-07-02 RX ADMIN — IRON SUCROSE 400 MG: 20 INJECTION, SOLUTION INTRAVENOUS at 12:17

## 2024-07-02 RX ADMIN — SODIUM CHLORIDE, PRESERVATIVE FREE 10 ML: 5 INJECTION INTRAVENOUS at 15:09

## 2024-07-02 RX ADMIN — SODIUM CHLORIDE 20 ML/HR: 9 INJECTION, SOLUTION INTRAVENOUS at 12:04

## 2024-07-02 ASSESSMENT — PAIN SCALES - GENERAL: PAINLEVEL_OUTOF10: 0

## 2024-07-02 NOTE — PROGRESS NOTES
Iron infusion completed and monitored for 30 min post infusion.  No sign of reaction at this time.  Pt ambulated out infusion center without difficulty in stable condition.

## 2024-07-03 ENCOUNTER — HOSPITAL ENCOUNTER (OUTPATIENT)
Dept: RADIATION ONCOLOGY | Age: 44
Discharge: HOME | End: 2024-07-03
Payer: COMMERCIAL

## 2024-07-03 VITALS
BODY MASS INDEX: 40.24 KG/M2 | DIASTOLIC BLOOD PRESSURE: 100 MMHG | WEIGHT: 256.9 LBS | HEART RATE: 119 BPM | TEMPERATURE: 98 F | RESPIRATION RATE: 18 BRPM | OXYGEN SATURATION: 99 % | SYSTOLIC BLOOD PRESSURE: 148 MMHG

## 2024-07-03 PROCEDURE — 77386 HC NTSTY MODUL RAD TX DLVR CPLX: CPT | Performed by: RADIOLOGY

## 2024-07-03 PROCEDURE — 77336 RADIATION PHYSICS CONSULT: CPT | Performed by: RADIOLOGY

## 2024-07-03 RX ORDER — ONDANSETRON 4 MG/1
4 TABLET, FILM COATED ORAL EVERY 8 HOURS PRN
Qty: 90 TABLET | Refills: 0 | Status: SHIPPED | OUTPATIENT
Start: 2024-07-03

## 2024-07-03 NOTE — PROGRESS NOTES
Evangelian King  7/3/2024  Wt Readings from Last 3 Encounters:   07/03/24 116.5 kg (256 lb 14.4 oz)   06/05/24 115.9 kg (255 lb 8 oz)   06/04/24 118 kg (260 lb 1.6 oz)     Body mass index is 40.24 kg/m².      Treatment Area: Pelvis    Comfort Alteration    Fatigue: Moderate    Mucous Membrane Alteration  Drainage: No  Drainage Odor: No  Vaginal Bleeding: Yes    Nutritional Alteration  Anorexia: Yes  Nausea: Yes  Vomiting: Yes     Elimination Alterations  Constipation: yes  Diarrhea:  no  Urinary Frequency/Urgency: N/A-Has bilateral nephrostomy tubes  Urinary Retention: NA  Dysuria: No  Urinary Incontinence: No  Proctitis: No    Skin Alteration   Sensation: WNL    Radiation Dermatitis:  Intact [x]     Erythema  [x]     Discoloration  []     Rash []     Dry desquamation  []     Moist desquamation []       Emotional  Coping: somewhat effective      Injury, potential bleeding or infection:     Lab Results   Component Value Date    WBC 12.6 (H) 05/22/2024    HGB 8.4 (L) 05/22/2024    HCT 29.2 (L) 05/22/2024     06/19/2024         Pulse (!) 119   Temp 98 °F (36.7 °C) (Temporal)   Resp 18   Wt 116.5 kg (256 lb 14.4 oz)   SpO2 99%   BMI 40.24 kg/m²                     Assessment/Plan: Patient was seen today for weekly visit.  Reports ongoing vaginal bleeding on and off.  Reports nausea and does not have nausea mediation.  Reports pelvic pain and pressure.  Has bilateral nephrostomy tubes draining yellow urine.  Plan of care ongoing.     Karina Hearn RN  
tablet by mouth every 6 hours as needed for Pain (take 1 pill every 6 hours) for up to 30 days. Max Daily Amount: 200 mg, Disp: 60 tablet, Rfl: 1    cyclobenzaprine (FLEXERIL) 10 MG tablet, Take 1 tablet by mouth 3 times daily as needed for Muscle spasms, Disp: 90 tablet, Rfl: 1    sertraline (ZOLOFT) 25 MG tablet, Take 1 tablet by mouth daily, Disp: 30 tablet, Rfl: 3    acetaminophen (TYLENOL) 500 MG tablet, Take 2 tablets by mouth every 6 hours as needed for Pain, Disp: 60 tablet, Rfl: 0      ASSESSMENT PLAN:   Treatment setup and plan reviewed. Port images/CBCT images reviewed. Appropriate laboratory work was reviewed. Treatment side effects and toxicities reviewed with the patient, and appropriate management was advised. Will continue radiation treatment as planned, and recommend patient contact us if they have any questions or concerns.  Will prescribe Zofran for patient to use as needed for nausea on days of treatment.  She also was recommended to continue using MiraLAX to avoid constipation while on her iron infusion.  She was discussed trying cranberry juice to help with pelvic and bladder discomfort as well.    Electronically signed by Jeremy Vinson MD on 7/3/2024 at 1:49 PM      Drugs Prescribed:  New Prescriptions    ONDANSETRON (ZOFRAN) 4 MG TABLET    Take 1 tablet by mouth every 8 hours as needed for Nausea or Vomiting       Other Orders Placed:  No orders of the defined types were placed in this encounter.

## 2024-07-05 ENCOUNTER — APPOINTMENT (OUTPATIENT)
Dept: RADIATION ONCOLOGY | Age: 44
End: 2024-07-05
Payer: COMMERCIAL

## 2024-07-05 ENCOUNTER — HOSPITAL ENCOUNTER (OUTPATIENT)
Dept: INTERVENTIONAL RADIOLOGY/VASCULAR | Age: 44
End: 2024-07-05
Payer: COMMERCIAL

## 2024-07-05 VITALS
DIASTOLIC BLOOD PRESSURE: 89 MMHG | SYSTOLIC BLOOD PRESSURE: 138 MMHG | HEART RATE: 123 BPM | RESPIRATION RATE: 18 BRPM | OXYGEN SATURATION: 99 %

## 2024-07-05 DIAGNOSIS — Q62.11 HYDRONEPHROSIS WITH URETEROPELVIC JUNCTION (UPJ) OBSTRUCTION: ICD-10-CM

## 2024-07-05 PROCEDURE — 50435 EXCHANGE NEPHROSTOMY CATH: CPT

## 2024-07-05 PROCEDURE — 75984 XRAY CONTROL CATHETER CHANGE: CPT

## 2024-07-05 PROCEDURE — C1769 GUIDE WIRE: HCPCS

## 2024-07-05 PROCEDURE — 6360000004 HC RX CONTRAST MEDICATION: Performed by: NURSE PRACTITIONER

## 2024-07-05 RX ADMIN — IOHEXOL 12 ML: 240 INJECTION, SOLUTION INTRATHECAL; INTRAVASCULAR; INTRAVENOUS; ORAL at 13:26

## 2024-07-05 NOTE — PROGRESS NOTES
Patient to IR for bilateral nephrostomy tube exchange.  Dr. Purdy and KP/KT RTs at bedside.  Both sites prepped and draped, areas numbed with lidocaine.  Nephrostomy tubes exchanged (see LDAs for tube documentation).  Nephrostomy tubes sutured, stayfix drsgs applied.   Patient tolerated well and is ambulatory from dept.  Next appt given.

## 2024-07-08 ENCOUNTER — HOSPITAL ENCOUNTER (OUTPATIENT)
Age: 44
Discharge: HOME | End: 2024-07-10
Payer: COMMERCIAL

## 2024-07-08 ENCOUNTER — HOSPITAL ENCOUNTER (OUTPATIENT)
Dept: RADIATION ONCOLOGY | Age: 44
Discharge: HOME | End: 2024-07-08
Payer: COMMERCIAL

## 2024-07-08 ENCOUNTER — HOSPITAL ENCOUNTER (OUTPATIENT)
Dept: GENERAL RADIOLOGY | Age: 44
Discharge: HOME | End: 2024-07-10
Payer: COMMERCIAL

## 2024-07-08 VITALS
OXYGEN SATURATION: 97 % | HEART RATE: 126 BPM | TEMPERATURE: 97.6 F | DIASTOLIC BLOOD PRESSURE: 76 MMHG | SYSTOLIC BLOOD PRESSURE: 156 MMHG | RESPIRATION RATE: 20 BRPM

## 2024-07-08 DIAGNOSIS — G89.3 CANCER ASSOCIATED PAIN: ICD-10-CM

## 2024-07-08 DIAGNOSIS — C53.0 MALIGNANT NEOPLASM OF ENDOCERVIX (HCC): ICD-10-CM

## 2024-07-08 DIAGNOSIS — C53.0 MALIGNANT NEOPLASM OF ENDOCERVIX (HCC): Primary | ICD-10-CM

## 2024-07-08 PROCEDURE — 71046 X-RAY EXAM CHEST 2 VIEWS: CPT

## 2024-07-08 PROCEDURE — 77386 HC NTSTY MODUL RAD TX DLVR CPLX: CPT | Performed by: RADIOLOGY

## 2024-07-08 RX ORDER — ACETAMINOPHEN AND CODEINE PHOSPHATE 300; 30 MG/1; MG/1
1 TABLET ORAL EVERY 8 HOURS PRN
Qty: 42 TABLET | Refills: 0 | Status: SHIPPED | OUTPATIENT
Start: 2024-07-08 | End: 2024-07-22

## 2024-07-08 RX ORDER — METRONIDAZOLE 500 MG/1
500 TABLET ORAL 3 TIMES DAILY
Qty: 30 TABLET | Refills: 0 | Status: SHIPPED | OUTPATIENT
Start: 2024-07-08 | End: 2024-07-18

## 2024-07-08 NOTE — PROGRESS NOTES
Patient appeared short of breath when arriving for her radiation treatment today.  She states increased pain, especially when taking inhaling, since she had her nephrostomy tubes exchanged on 7/5/24.  States Tramadol and Gabapentin not effective and that she can only take shallow breaths.  See vitals.  Reviewed with Dr. Vinson and pt prescribed Tylenol #3-reluctant to take stronger pain medication \"for fear of addiction.\"  Dr. Vinson examined pt and ordered chest xray and referral to Palliative Care.

## 2024-07-09 ENCOUNTER — CARE COORDINATION (OUTPATIENT)
Dept: CARE COORDINATION | Age: 44
End: 2024-07-09

## 2024-07-09 ENCOUNTER — TELEPHONE (OUTPATIENT)
Dept: PALLATIVE CARE | Age: 44
End: 2024-07-09

## 2024-07-09 ENCOUNTER — HOSPITAL ENCOUNTER (OUTPATIENT)
Dept: RADIATION ONCOLOGY | Age: 44
Discharge: HOME | End: 2024-07-09
Payer: COMMERCIAL

## 2024-07-09 PROCEDURE — 77386 HC NTSTY MODUL RAD TX DLVR CPLX: CPT | Performed by: RADIOLOGY

## 2024-07-09 NOTE — CARE COORDINATION
Care Transitions Note    Follow Up Call     Attempted to reach patient for transitions of care follow up.  Unable to reach patient.      Outreach Attempts:   1st attempt.     Care Summary Note: Left message to return call, patient had MD appt today     Follow Up Appointment:   Future Appointments         Provider Specialty Dept Phone    7/10/2024 10:00 AM [facility] Radiation Oncology [phone]    7/10/2024 10:15 AM Jeremy Vinson MD Radiation Oncology [phone]    7/11/2024 10:00 AM [facility] [phone]    7/12/2024 10:00 AM [facility] [phone]    7/15/2024 10:00 AM [facility] [phone]    7/16/2024 10:00 AM [facility] [phone]    7/23/2024 12:00 PM Tera Lomeli MD Oncology [phone]    8/16/2024 1:00 PM Radiologist, [facility]; [facility] RN POOL; [facility] BI-PLANE RM 2 Radiology Special Procedures [phone]    9/18/2024 11:20 AM Enmanuel Crowell MD Urology [phone]            Plan for follow-up on next business day.  based on severity of symptoms and risk factors. Plan for next call:  check on how radiation is going, how is pain? How is breathing?     Courtney Ramos RN        
Remote Patient Monitoring (RPM) program for in-home monitoring: Patient is not eligible for RPM program because: patient does not have qualifying diagnosis.    Assessments:  Care Transitions Subsequent and Final Call    Schedule Follow Up Appointment with PCP: Completed  Subsequent and Final Calls  Do you have any ongoing symptoms?: Yes  Onset of Patient-reported symptoms: In the past 7 days  Patient-reported symptoms: Pain, Other  Have your medications changed?: Yes  Patient Reports: Patient has script for Tylenol # 3  Do you have any questions related to your medications?: No  Do you currently have any active services?: Yes  Are you currently active with any services?: Home Health  Do you have any needs or concerns that I can assist you with?: No  Identified Barriers: Lack of Education, Stress, Lack of Support  Care Transitions Interventions  Other Interventions:              Follow Up Appointment:   Discussed follow up appointments. Patient has hospital follow up appointment scheduled within 7 days of discharge.   Future Appointments         Provider Specialty Dept Phone    7/10/2024 10:00 AM [facility] Radiation Oncology [phone]    7/10/2024 10:15 AM Jeremy Vinson MD Radiation Oncology [phone]    7/11/2024 10:00 AM [facility] On[facility] [phone]    7/12/2024 10:00 AM [facility] On[facility] [phone]    7/15/2024 10:00 AM [facility] [phone]    7/16/2024 10:00 AM [facility] Rad[facility] On[facility] [phone]    7/23/2024 12:00 PM Tera Lomeli MD Oncology [phone]    8/16/2024 1:00 PM Radiologist, [facility] Interventional; [facility] INTERVENT RN POOL; [facility] BI-PLANE RM 2 Radiology Special Procedures [phone]    9/18/2024 11:20 AM Enmanuel Crowell MD Urology [phone]            Care Transition Nurse provided contact information.  Plan for follow-up on next business day.  based on severity of symptoms and risk factors.  Plan for

## 2024-07-09 NOTE — TELEPHONE ENCOUNTER
Received referral for [facility] palliative care clinic for Evangelina from Dr Vinson.    Pt with Dx: Malignant neoplasm of endocervix (HCC)   Is on radiation treatments at this time.      Called patient's number to explain what palliative care services are and to offer to schedule consultation.    Spoke with son on the phone.  He relates that she is at a doctor's appointment right now.    I asked if I could leave my name and number with him.  Son took my name number and that I was from [facility] Palliative Care Clinic.  I let him know I schedule the appointments for the clinic.  I let him know I will call back in a couple days, or if his mom wants to she can call me back.    Thanked him for his time.     [facility] Palliative Care Coordinator  Susu GIL, RN, ONN-CG  [facility] [place] [phone]/ [facility] [phone]/ [facility] [phone]

## 2024-07-10 ENCOUNTER — HOSPITAL ENCOUNTER (OUTPATIENT)
Dept: RADIATION ONCOLOGY | Age: 44
Discharge: HOME | End: 2024-07-10
Payer: COMMERCIAL

## 2024-07-10 ENCOUNTER — CARE COORDINATION (OUTPATIENT)
Dept: CARE COORDINATION | Age: 44
End: 2024-07-10

## 2024-07-10 VITALS
DIASTOLIC BLOOD PRESSURE: 82 MMHG | WEIGHT: 252 LBS | BODY MASS INDEX: 39.47 KG/M2 | OXYGEN SATURATION: 100 % | TEMPERATURE: 97 F | SYSTOLIC BLOOD PRESSURE: 132 MMHG | HEART RATE: 116 BPM | RESPIRATION RATE: 16 BRPM

## 2024-07-10 PROCEDURE — 77386 HC NTSTY MODUL RAD TX DLVR CPLX: CPT | Performed by: RADIOLOGY

## 2024-07-10 NOTE — CARE COORDINATION
Care Transitions Note    Final Call     Patient Current Location:  Home: [address] [place] [address]    Care Transition Nurse contacted the patient by telephone. Verified name and  as identifiers.    Patient graduated from the Care Transitions program on 7/10.  Patient/family progressing towards self management. .      Advance Care Planning:   Does patient have an Advance Directive: reviewed during previous call, see note. .    Handoff:   Patient was not referred to the [facility] team due to  patient has oncology navigator, does not have PCP at present.      Care Summary Note: Spoke with Evangelina today for follow up.  She is doing a little better today with pain management, she did take a Tylenol with Codeine, states she stopped taking the Gabapentin, felt it made her tired, was told by family she was acting \"strange\" grabbing at things in the air, etc.  She was not sure it was helping with pain or not.  She has the bilateral nephrostomy tubes that were exchanged last week, had bad experience with exchange, was quite painful the last time. She is going to try to urinate in the commode, hopefully after some radiation now she will be able to urinate on her own.  We did discuss that her body may take some time to get there as well since she has not went for some time now.  She also said she has difficulty passing gas, has to go to the bathroom and move around to expel.  Nausea has been coming and going, today was worse and had to take anti emetic.    Expressed to Evangelina this would be final outreach.   She has had her testing, biopsies, port placement, nephrostomy tube exchanges scheduled out monthly and has an oncology navigator that is following.  Her spouse should be returning home from the hospital on Thursday, this should help alleviate a lot of her stress and anxiety.  They have had little time together since she came home with her new diagnosis.  Hopefully this will help with her emotional stability during this

## 2024-07-10 NOTE — PROGRESS NOTES
[facility]       Radiation Oncology          [address]          [place] [id number]        O: [phone]        F: [phone]       [facility]             RADIATION ONCOLOGY WEEKLY PROGRESS NOTE  Patient ID:   Evangelina King  : 1980   MRN: [id number]    Location:  [facility] Radiation Oncology,   [address] [place] [address]   [phone]    DIAGNOSIS:  Cancer Staging   Primary cervical squamous cell carcinoma (HCC)  Staging form: Cervix Uteri, AJCC Version 9  - Clinical stage from 2024: FIGO Stage SLIME (cT4, cN2a, cM0) - Signed by Jeremy Vinson MD on 2024      TREATMENT DETAILS:  Treatment Site: Pelvis  Actual Dose: 1800cGy  Total Planned Dose: 3000cGy  Treatment Technique: IMRT  Fraction Technique: Daily  Therapy imaging monitoring: CBCT daily  Concurrent Systemic Therapy: NA    SUBJECTIVE:   Patient seen for their weekly on treatment evaluation today.  Patient is tolerated treatments well noting less pain and no further bleeding.  She does note some irritation in her suprapubic area.  She notes her breathing is better though she still has pain with inspiration.  We did a chest x-ray on Monday which showed no evidence of pneumothorax though there is a small pleural effusion present.  She is using Imodium which has helped her diarrhea.  She denies any skin irritation.  She does have significant hot flashes.  She notes her pain is better improved with Tylenol 3 and no longer requires the gabapentin or Flexeril.    OBJECTIVE:   CHAPERONE: Family/friend/companieon Present    ECO Symptomatic but completely ambulatory    VITAL SIGNS: /82   Pulse (!) 116   Temp 97 °F (36.1 °C) (Temporal)   Resp 16   Wt 114.3 kg (252 lb)   SpO2 100%   BMI 39.47 kg/m²   Wt Readings from Last 5 Encounters:   07/10/24 114.3 kg (252 lb)   24 116.5 kg (256 lb 14.4 oz)   24 115.9 kg (255 lb 8 oz)   24 118 kg (260 lb 1.6

## 2024-07-10 NOTE — PROGRESS NOTES
Evangelina King  7/10/2024  Wt Readings from Last 3 Encounters:   07/10/24 114.3 kg (252 lb)   07/03/24 116.5 kg (256 lb 14.4 oz)   06/05/24 115.9 kg (255 lb 8 oz)     Body mass index is 39.47 kg/m².      Treatment Area: Pelvis    Comfort Alteration    Fatigue: Moderate    Mucous Membrane Alteration  Drainage: No  Drainage Odor: No  Vaginal Bleeding: Yes    Nutritional Alteration  Anorexia: Yes  Nausea: Improved with Zofran  Vomiting: No    Elimination Alterations  Constipation: yes  Diarrhea:  Yes-Taking Imodim  Urinary Frequency/Urgency: N/A-Has bilateral nephrostomy tubes  Urinary Retention: NA  Dysuria: No  Urinary Incontinence: No  Proctitis: No    Skin Alteration   Sensation: WNL    Radiation Dermatitis:  Intact [x]     Erythema  [x]     Discoloration  []     Rash []     Dry desquamation  []     Moist desquamation []       Emotional  Coping: somewhat effective      Injury, potential bleeding or infection:     Lab Results   Component Value Date    WBC 12.6 (H) 05/22/2024    HGB 8.4 (L) 05/22/2024    HCT 29.2 (L) 05/22/2024     06/19/2024         /82   Pulse (!) 116   Temp 97 °F (36.1 °C) (Temporal)   Resp 16   Wt 114.3 kg (252 lb)   SpO2 100%   BMI 39.47 kg/m²                     Assessment/Plan: Patient was seen today for weekly visit.  Reports ongoing vaginal bleeding on and off.  States nausea is controlled with Zofran and Tylenol #3 is more effective for pain.  Reports hot flashes and episodes of diaphoresis.  Suggested discussing anxiety in general with Courtney Contreras when she sees her and strongly encourage her to make a consult appt with her asap.   Has bilateral nephrostomy tubes draining yellow urine.  Taking Imodium for diarrhea which is helpful.  Plan of care ongoing.     Karina Hearn RN

## 2024-07-11 ENCOUNTER — HOSPITAL ENCOUNTER (OUTPATIENT)
Dept: RADIATION ONCOLOGY | Age: 44
Discharge: HOME | End: 2024-07-11
Payer: COMMERCIAL

## 2024-07-11 PROCEDURE — 77386 HC NTSTY MODUL RAD TX DLVR CPLX: CPT | Performed by: RADIOLOGY

## 2024-07-12 ENCOUNTER — HOSPITAL ENCOUNTER (OUTPATIENT)
Dept: RADIATION ONCOLOGY | Age: 44
Discharge: HOME | End: 2024-07-12
Payer: COMMERCIAL

## 2024-07-12 PROCEDURE — 77386 HC NTSTY MODUL RAD TX DLVR CPLX: CPT | Performed by: RADIOLOGY

## 2024-07-15 ENCOUNTER — HOSPITAL ENCOUNTER (OUTPATIENT)
Dept: RADIATION ONCOLOGY | Age: 44
Discharge: HOME | End: 2024-07-15
Payer: COMMERCIAL

## 2024-07-15 PROCEDURE — 77386 HC NTSTY MODUL RAD TX DLVR CPLX: CPT | Performed by: RADIOLOGY

## 2024-07-16 ENCOUNTER — HOSPITAL ENCOUNTER (OUTPATIENT)
Dept: RADIATION ONCOLOGY | Age: 44
Discharge: HOME | End: 2024-07-16
Payer: COMMERCIAL

## 2024-07-16 PROCEDURE — 77386 HC NTSTY MODUL RAD TX DLVR CPLX: CPT | Performed by: RADIOLOGY

## 2024-07-17 ENCOUNTER — HOSPITAL ENCOUNTER (OUTPATIENT)
Dept: INFUSION THERAPY | Age: 44
Discharge: HOME | End: 2024-07-17
Payer: COMMERCIAL

## 2024-07-17 PROCEDURE — 99203 OFFICE O/P NEW LOW 30 MIN: CPT

## 2024-07-17 PROCEDURE — 99213 OFFICE O/P EST LOW 20 MIN: CPT

## 2024-07-18 ENCOUNTER — TELEPHONE (OUTPATIENT)
Dept: PALLATIVE CARE | Age: 44
End: 2024-07-18

## 2024-07-18 NOTE — TELEPHONE ENCOUNTER
Second call to try to schedule palliative care consultation.   Left message on patient's voicemail. Left my name and contact number.  Briefly explained what palliative care is and that Dr. Vinson referred her.    Encouraged her to call back to discuss palliative care and set up consultation.     [facility] Palliative Care Coordinator  Susu ARTN, RN, ONN-CG  [facility] [place] [phone]/ [facility] [phone]/ [facility] [phone]

## 2024-07-19 ENCOUNTER — TELEPHONE (OUTPATIENT)
Dept: ONCOLOGY | Age: 44
End: 2024-07-19

## 2024-07-19 NOTE — TELEPHONE ENCOUNTER
Name: Evangelina King  : 1980  MRN: [id number]    Oncology Navigation Follow-Up Note    Contact Type:  Telephone    Notes: Spoke w/pt for f/u call.  Pt inquired on letter for 401K withdrawal.  Instructed pt may discuss w/Dr. Lomeli @  f/u.  Pt inquired on assistance w/[employer].  Instructed pt to contact [facility] .  Pt denied further questions/concerns.  Encouraged to contact writer prn.  Will continue to follow.      Electronically signed by Jessa Skaggs RN on 2024 at 4:16 PM

## 2024-07-21 ENCOUNTER — HOSPITAL ENCOUNTER (EMERGENCY)
Age: 44
Discharge: HOME | End: 2024-07-21
Attending: EMERGENCY MEDICINE
Payer: COMMERCIAL

## 2024-07-21 ENCOUNTER — APPOINTMENT (OUTPATIENT)
Dept: CT IMAGING | Age: 44
End: 2024-07-21
Payer: COMMERCIAL

## 2024-07-21 VITALS
SYSTOLIC BLOOD PRESSURE: 111 MMHG | TEMPERATURE: 98 F | RESPIRATION RATE: 16 BRPM | DIASTOLIC BLOOD PRESSURE: 72 MMHG | HEART RATE: 110 BPM | WEIGHT: 234 LBS | HEIGHT: 67 IN | OXYGEN SATURATION: 94 % | BODY MASS INDEX: 36.73 KG/M2

## 2024-07-21 DIAGNOSIS — N39.0 URINARY TRACT INFECTION WITHOUT HEMATURIA, SITE UNSPECIFIED: Primary | ICD-10-CM

## 2024-07-21 LAB
ANION GAP SERPL CALCULATED.3IONS-SCNC: 14 MMOL/L (ref 9–17)
BACTERIA URNS QL MICRO: ABNORMAL
BASOPHILS # BLD: 0.03 K/UL (ref 0–0.2)
BASOPHILS NFR BLD: 0 % (ref 0–2)
BILIRUB UR QL STRIP: NEGATIVE
BUN SERPL-MCNC: 14 MG/DL (ref 6–20)
BUN/CREAT SERPL: 11 (ref 9–20)
CALCIUM SERPL-MCNC: 9.6 MG/DL (ref 8.6–10.4)
CHARACTER UR: ABNORMAL
CHLORIDE SERPL-SCNC: 95 MMOL/L (ref 98–107)
CLARITY UR: ABNORMAL
CO2 SERPL-SCNC: 27 MMOL/L (ref 20–31)
COLOR UR: YELLOW
CREAT SERPL-MCNC: 1.3 MG/DL (ref 0.5–0.9)
EOSINOPHIL # BLD: 0.15 K/UL (ref 0–0.44)
EOSINOPHILS RELATIVE PERCENT: 2 % (ref 1–4)
EPI CELLS #/AREA URNS HPF: ABNORMAL /HPF (ref 0–5)
ERYTHROCYTE [DISTWIDTH] IN BLOOD BY AUTOMATED COUNT: 20.8 % (ref 11.8–14.4)
GFR, ESTIMATED: 52 ML/MIN/1.73M2
GLUCOSE SERPL-MCNC: 117 MG/DL (ref 70–99)
GLUCOSE UR STRIP-MCNC: NEGATIVE MG/DL
HCT VFR BLD AUTO: 34.5 % (ref 36.3–47.1)
HGB BLD-MCNC: 10.5 G/DL (ref 11.9–15.1)
HGB UR QL STRIP.AUTO: ABNORMAL
IMM GRANULOCYTES # BLD AUTO: 0.04 K/UL (ref 0–0.3)
IMM GRANULOCYTES NFR BLD: 1 %
KETONES UR STRIP-MCNC: ABNORMAL MG/DL
LEUKOCYTE ESTERASE UR QL STRIP: ABNORMAL
LYMPHOCYTES NFR BLD: 0.65 K/UL (ref 1.1–3.7)
LYMPHOCYTES RELATIVE PERCENT: 9 % (ref 24–43)
MCH RBC QN AUTO: 22.3 PG (ref 25.2–33.5)
MCHC RBC AUTO-ENTMCNC: 30.4 G/DL (ref 25.2–33.5)
MCV RBC AUTO: 73.4 FL (ref 82.6–102.9)
MONOCYTES NFR BLD: 0.66 K/UL (ref 0.1–1.2)
MONOCYTES NFR BLD: 9 % (ref 3–12)
MUCOUS THREADS URNS QL MICRO: ABNORMAL
NEUTROPHILS NFR BLD: 78 % (ref 36–65)
NEUTS SEG NFR BLD: 5.57 K/UL (ref 1.5–8.1)
NITRITE UR QL STRIP: POSITIVE
NRBC BLD-RTO: 0 PER 100 WBC
PH UR STRIP: 6 [PH] (ref 5–6)
PLATELET # BLD AUTO: 270 K/UL (ref 138–453)
PMV BLD AUTO: 8.4 FL (ref 8.1–13.5)
POTASSIUM SERPL-SCNC: 4.3 MMOL/L (ref 3.7–5.3)
PROT UR STRIP-MCNC: ABNORMAL MG/DL
RBC # BLD AUTO: 4.7 M/UL (ref 3.95–5.11)
RBC #/AREA URNS HPF: ABNORMAL /HPF (ref 0–4)
SODIUM SERPL-SCNC: 136 MMOL/L (ref 135–144)
SP GR UR STRIP: 1.03 (ref 1.01–1.02)
UROBILINOGEN UR STRIP-ACNC: NORMAL EU/DL (ref 0–1)
WBC #/AREA URNS HPF: ABNORMAL /HPF (ref 0–4)
WBC OTHER # BLD: 7.1 K/UL (ref 3.5–11.3)

## 2024-07-21 PROCEDURE — 99285 EMERGENCY DEPT VISIT HI MDM: CPT

## 2024-07-21 PROCEDURE — 2580000003 HC RX 258: Performed by: EMERGENCY MEDICINE

## 2024-07-21 PROCEDURE — 2709999900 CT ABDOMEN PELVIS W IV CONTRAST

## 2024-07-21 PROCEDURE — 6360000004 HC RX CONTRAST MEDICATION: Performed by: EMERGENCY MEDICINE

## 2024-07-21 PROCEDURE — 87186 SC STD MICRODIL/AGAR DIL: CPT

## 2024-07-21 PROCEDURE — 96375 TX/PRO/DX INJ NEW DRUG ADDON: CPT

## 2024-07-21 PROCEDURE — 96365 THER/PROPH/DIAG IV INF INIT: CPT

## 2024-07-21 PROCEDURE — 81001 URINALYSIS AUTO W/SCOPE: CPT

## 2024-07-21 PROCEDURE — 6360000002 HC RX W HCPCS: Performed by: EMERGENCY MEDICINE

## 2024-07-21 PROCEDURE — 87077 CULTURE AEROBIC IDENTIFY: CPT

## 2024-07-21 PROCEDURE — 80048 BASIC METABOLIC PNL TOTAL CA: CPT

## 2024-07-21 PROCEDURE — 87086 URINE CULTURE/COLONY COUNT: CPT

## 2024-07-21 PROCEDURE — 85025 COMPLETE CBC W/AUTO DIFF WBC: CPT

## 2024-07-21 RX ORDER — FENTANYL CITRATE 50 UG/ML
25 INJECTION, SOLUTION INTRAMUSCULAR; INTRAVENOUS ONCE
Status: COMPLETED | OUTPATIENT
Start: 2024-07-21 | End: 2024-07-21

## 2024-07-21 RX ORDER — CEPHALEXIN 500 MG/1
500 CAPSULE ORAL 4 TIMES DAILY
Qty: 28 CAPSULE | Refills: 0 | Status: ON HOLD | OUTPATIENT
Start: 2024-07-21 | End: 2024-07-24 | Stop reason: HOSPADM

## 2024-07-21 RX ORDER — KETOROLAC TROMETHAMINE 30 MG/ML
30 INJECTION, SOLUTION INTRAMUSCULAR; INTRAVENOUS ONCE
Status: COMPLETED | OUTPATIENT
Start: 2024-07-21 | End: 2024-07-21

## 2024-07-21 RX ADMIN — CEFTRIAXONE 1000 MG: 1 INJECTION, POWDER, FOR SOLUTION INTRAMUSCULAR; INTRAVENOUS at 06:05

## 2024-07-21 RX ADMIN — KETOROLAC TROMETHAMINE 30 MG: 30 INJECTION, SOLUTION INTRAMUSCULAR at 03:58

## 2024-07-21 RX ADMIN — FENTANYL CITRATE 25 MCG: 50 INJECTION, SOLUTION INTRAMUSCULAR; INTRAVENOUS at 03:58

## 2024-07-21 RX ADMIN — IOPAMIDOL 100 ML: 755 INJECTION, SOLUTION INTRAVENOUS at 04:48

## 2024-07-21 NOTE — ED PROVIDER NOTES
(36.7 °C)      SpO2: 100%  96% 94%   Weight: 106.1 kg (234 lb)      Height: 1.702 m (5' 7\")        -------------------------  BP: 111/72, Temp: 98 °F (36.7 °C), Pulse: (!) 110, Respirations: 16    Orders Placed This Encounter   Medications    fentaNYL (SUBLIMAZE) injection 25 mcg    ketorolac (TORADOL) injection 30 mg    iopamidol (ISOVUE-370) 76 % injection 100 mL    cefTRIAXone (ROCEPHIN) 1,000 mg in sodium chloride 0.9 % 50 mL IVPB (mini-bag)     Order Specific Question:   Antimicrobial Indications     Answer:   Urinary Tract Infection    cephALEXin (KEFLEX) 500 MG capsule     Sig: Take 1 capsule by mouth 4 times daily for 7 days     Dispense:  28 capsule     Refill:  0           Re-evaluation Notes    White count is normal she is afebrile urine shows obvious signs of infection CAT scan reveals tubes to be in place and functional at this time patient was started with IV antibiotics here prescription for the same early follow-up with her primary urologist or oncologist return if worse    CRITICAL CARE:   None      CONSULTS:      PROCEDURES:  None    FINAL IMPRESSION      1. Urinary tract infection without hematuria, site unspecified          DISPOSITION/PLAN   DISPOSITION Decision To Discharge 07/21/2024 06:00:21 AM      Condition on Disposition    Stable    PATIENT REFERRED TO:  Barbie Ball MD  [address] [place] [address]  [phone]    In 1 day        DISCHARGE MEDICATIONS:  Discharge Medication List as of 7/21/2024  6:01 AM        START taking these medications    Details   cephALEXin (KEFLEX) 500 MG capsule Take 1 capsule by mouth 4 times daily for 7 days, Disp-28 capsule, R-0Normal             (Please note that portions of this note were completed with a voice recognition program.  Efforts were made to edit the dictations but occasionally words are mis-transcribed.)    Mikey Bustamante MD,, MD, F.A.C.E.P.  Attending Emergency Physician        Mikey Bustamante MD  07/21/24 0704

## 2024-07-22 ENCOUNTER — TELEPHONE (OUTPATIENT)
Dept: PALLATIVE CARE | Age: 44
End: 2024-07-22

## 2024-07-22 ENCOUNTER — HOSPITAL ENCOUNTER (INPATIENT)
Age: 44
LOS: 2 days | Discharge: HOME | End: 2024-07-24
Attending: EMERGENCY MEDICINE | Admitting: FAMILY MEDICINE
Payer: COMMERCIAL

## 2024-07-22 ENCOUNTER — HOSPITAL ENCOUNTER (OUTPATIENT)
Dept: INFUSION THERAPY | Age: 44
Discharge: HOME | End: 2024-07-22
Payer: COMMERCIAL

## 2024-07-22 DIAGNOSIS — N17.9 AKI (ACUTE KIDNEY INJURY) (HCC): ICD-10-CM

## 2024-07-22 DIAGNOSIS — T83.098A MALFUNCTION OF NEPHROSTOMY TUBE (HCC): Primary | ICD-10-CM

## 2024-07-22 DIAGNOSIS — E61.1 IRON DEFICIENCY: ICD-10-CM

## 2024-07-22 DIAGNOSIS — C53.8 MALIGNANT NEOPLASM OF OVERLAPPING SITES OF CERVIX (HCC): ICD-10-CM

## 2024-07-22 DIAGNOSIS — C53.9 PRIMARY CERVICAL SQUAMOUS CELL CARCINOMA (HCC): Primary | ICD-10-CM

## 2024-07-22 DIAGNOSIS — N12 PYELONEPHRITIS OF LEFT KIDNEY: ICD-10-CM

## 2024-07-22 LAB
ALBUMIN SERPL-MCNC: 3.1 G/DL (ref 3.5–5.2)
ALBUMIN SERPL-MCNC: 3.3 G/DL (ref 3.5–5.2)
ALBUMIN/GLOB SERPL: 0.7 {RATIO} (ref 1–2.5)
ALBUMIN/GLOB SERPL: 1 {RATIO} (ref 1–2.5)
ALP SERPL-CCNC: 108 U/L (ref 35–104)
ALP SERPL-CCNC: 110 U/L (ref 35–104)
ALT SERPL-CCNC: <5 U/L (ref 10–35)
ALT SERPL-CCNC: <5 U/L (ref 5–33)
ANION GAP SERPL CALCULATED.3IONS-SCNC: 15 MMOL/L (ref 9–16)
ANION GAP SERPL CALCULATED.3IONS-SCNC: 15 MMOL/L (ref 9–17)
AST SERPL-CCNC: 14 U/L
AST SERPL-CCNC: 18 U/L (ref 10–35)
BASOPHILS # BLD: 0.06 K/UL (ref 0–0.2)
BASOPHILS # BLD: 0.08 K/UL (ref 0–0.2)
BASOPHILS NFR BLD: 1 % (ref 0–2)
BASOPHILS NFR BLD: 1 % (ref 0–2)
BILIRUB SERPL-MCNC: 0.2 MG/DL (ref 0.3–1.2)
BILIRUB SERPL-MCNC: 0.2 MG/DL (ref 0–1.2)
BUN SERPL-MCNC: 21 MG/DL (ref 6–20)
BUN SERPL-MCNC: 23 MG/DL (ref 6–20)
BUN/CREAT SERPL: 8 (ref 9–20)
CALCIUM SERPL-MCNC: 9 MG/DL (ref 8.6–10.4)
CALCIUM SERPL-MCNC: 9 MG/DL (ref 8.6–10.4)
CHLORIDE SERPL-SCNC: 95 MMOL/L (ref 98–107)
CHLORIDE SERPL-SCNC: 96 MMOL/L (ref 98–107)
CO2 SERPL-SCNC: 22 MMOL/L (ref 20–31)
CO2 SERPL-SCNC: 23 MMOL/L (ref 20–31)
CREAT SERPL-MCNC: 2.8 MG/DL (ref 0.5–0.9)
CREAT SERPL-MCNC: 2.9 MG/DL (ref 0.5–0.9)
EOSINOPHIL # BLD: 0.19 K/UL (ref 0–0.44)
EOSINOPHIL # BLD: 0.24 K/UL (ref 0–0.44)
EOSINOPHILS RELATIVE PERCENT: 3 % (ref 1–4)
EOSINOPHILS RELATIVE PERCENT: 3 % (ref 1–4)
ERYTHROCYTE [DISTWIDTH] IN BLOOD BY AUTOMATED COUNT: 21.3 % (ref 11.8–14.4)
ERYTHROCYTE [DISTWIDTH] IN BLOOD BY AUTOMATED COUNT: 21.4 % (ref 11.8–14.4)
FERRITIN SERPL-MCNC: 471 NG/ML (ref 13–150)
GFR, ESTIMATED: 20 ML/MIN/1.73M2
GFR, ESTIMATED: 21 ML/MIN/1.73M2
GLUCOSE SERPL-MCNC: 123 MG/DL (ref 70–99)
GLUCOSE SERPL-MCNC: 93 MG/DL (ref 74–99)
HCT VFR BLD AUTO: 30.7 % (ref 36.3–47.1)
HCT VFR BLD AUTO: 31 % (ref 36.3–47.1)
HGB BLD-MCNC: 9.1 G/DL (ref 11.9–15.1)
HGB BLD-MCNC: 9.4 G/DL (ref 11.9–15.1)
IMM GRANULOCYTES # BLD AUTO: 0.06 K/UL (ref 0–0.3)
IMM GRANULOCYTES # BLD AUTO: 0.08 K/UL (ref 0–0.3)
IMM GRANULOCYTES NFR BLD: 1 %
IMM GRANULOCYTES NFR BLD: 1 %
IRON SATN MFR SERPL: 12 % (ref 20–55)
IRON SERPL-MCNC: 18 UG/DL (ref 37–145)
LACTIC ACID, WHOLE BLOOD: 0.9 MMOL/L (ref 0.7–2.1)
LYMPHOCYTES NFR BLD: 0.62 K/UL (ref 1.1–3.7)
LYMPHOCYTES NFR BLD: 0.89 K/UL (ref 1.1–3.7)
LYMPHOCYTES RELATIVE PERCENT: 10 % (ref 24–43)
LYMPHOCYTES RELATIVE PERCENT: 11 % (ref 24–43)
MCH RBC QN AUTO: 22.2 PG (ref 25.2–33.5)
MCH RBC QN AUTO: 22.5 PG (ref 25.2–33.5)
MCHC RBC AUTO-ENTMCNC: 29.6 G/DL (ref 28.4–34.8)
MCHC RBC AUTO-ENTMCNC: 30.3 G/DL (ref 25.2–33.5)
MCV RBC AUTO: 74.3 FL (ref 82.6–102.9)
MCV RBC AUTO: 75.1 FL (ref 82.6–102.9)
MONOCYTES NFR BLD: 0.62 K/UL (ref 0.1–1.2)
MONOCYTES NFR BLD: 0.97 K/UL (ref 0.1–1.2)
MONOCYTES NFR BLD: 10 % (ref 3–12)
MONOCYTES NFR BLD: 12 % (ref 3–12)
MORPHOLOGY: ABNORMAL
NEUTROPHILS NFR BLD: 72 % (ref 36–65)
NEUTROPHILS NFR BLD: 75 % (ref 36–65)
NEUTS SEG NFR BLD: 4.65 K/UL (ref 1.5–8.1)
NEUTS SEG NFR BLD: 5.84 K/UL (ref 1.5–8.1)
NRBC BLD-RTO: 0 PER 100 WBC
NRBC BLD-RTO: 0 PER 100 WBC
PLATELET # BLD AUTO: 197 K/UL (ref 138–453)
PLATELET # BLD AUTO: 221 K/UL (ref 138–453)
PMV BLD AUTO: 8.3 FL (ref 8.1–13.5)
PMV BLD AUTO: 8.3 FL (ref 8.1–13.5)
POTASSIUM SERPL-SCNC: 4.3 MMOL/L (ref 3.7–5.3)
POTASSIUM SERPL-SCNC: 4.5 MMOL/L (ref 3.7–5.3)
PROT SERPL-MCNC: 7.2 G/DL (ref 6.6–8.7)
PROT SERPL-MCNC: 7.3 G/DL (ref 6.4–8.3)
RBC # BLD AUTO: 4.09 M/UL (ref 3.95–5.11)
RBC # BLD AUTO: 4.17 M/UL (ref 3.95–5.11)
SODIUM SERPL-SCNC: 132 MMOL/L (ref 136–145)
SODIUM SERPL-SCNC: 134 MMOL/L (ref 135–144)
TIBC SERPL-MCNC: 156 UG/DL (ref 250–450)
UNSATURATED IRON BINDING CAPACITY: 138 UG/DL (ref 112–347)
WBC OTHER # BLD: 6.2 K/UL (ref 3.5–11.3)
WBC OTHER # BLD: 8.1 K/UL (ref 3.5–11.3)

## 2024-07-22 PROCEDURE — 85025 COMPLETE CBC W/AUTO DIFF WBC: CPT

## 2024-07-22 PROCEDURE — 2580000003 HC RX 258: Performed by: INTERNAL MEDICINE

## 2024-07-22 PROCEDURE — 83550 IRON BINDING TEST: CPT

## 2024-07-22 PROCEDURE — 2580000003 HC RX 258: Performed by: STUDENT IN AN ORGANIZED HEALTH CARE EDUCATION/TRAINING PROGRAM

## 2024-07-22 PROCEDURE — 96374 THER/PROPH/DIAG INJ IV PUSH: CPT

## 2024-07-22 PROCEDURE — 83605 ASSAY OF LACTIC ACID: CPT

## 2024-07-22 PROCEDURE — 2580000003 HC RX 258: Performed by: NURSE PRACTITIONER

## 2024-07-22 PROCEDURE — 2580000003 HC RX 258

## 2024-07-22 PROCEDURE — 1200000000 HC SEMI PRIVATE

## 2024-07-22 PROCEDURE — 6360000002 HC RX W HCPCS: Performed by: STUDENT IN AN ORGANIZED HEALTH CARE EDUCATION/TRAINING PROGRAM

## 2024-07-22 PROCEDURE — 93005 ELECTROCARDIOGRAM TRACING: CPT | Performed by: STUDENT IN AN ORGANIZED HEALTH CARE EDUCATION/TRAINING PROGRAM

## 2024-07-22 PROCEDURE — 6360000002 HC RX W HCPCS

## 2024-07-22 PROCEDURE — 99222 1ST HOSP IP/OBS MODERATE 55: CPT | Performed by: NURSE PRACTITIONER

## 2024-07-22 PROCEDURE — 80053 COMPREHEN METABOLIC PANEL: CPT

## 2024-07-22 PROCEDURE — 36591 DRAW BLOOD OFF VENOUS DEVICE: CPT

## 2024-07-22 PROCEDURE — 99285 EMERGENCY DEPT VISIT HI MDM: CPT

## 2024-07-22 PROCEDURE — 6360000002 HC RX W HCPCS: Performed by: NURSE PRACTITIONER

## 2024-07-22 PROCEDURE — 6360000002 HC RX W HCPCS: Performed by: INTERNAL MEDICINE

## 2024-07-22 PROCEDURE — 83540 ASSAY OF IRON: CPT

## 2024-07-22 PROCEDURE — 82728 ASSAY OF FERRITIN: CPT

## 2024-07-22 PROCEDURE — 87040 BLOOD CULTURE FOR BACTERIA: CPT

## 2024-07-22 RX ORDER — SODIUM CHLORIDE, SODIUM LACTATE, POTASSIUM CHLORIDE, AND CALCIUM CHLORIDE .6; .31; .03; .02 G/100ML; G/100ML; G/100ML; G/100ML
1000 INJECTION, SOLUTION INTRAVENOUS ONCE
Status: COMPLETED | OUTPATIENT
Start: 2024-07-22 | End: 2024-07-22

## 2024-07-22 RX ORDER — SODIUM CHLORIDE 0.9 % (FLUSH) 0.9 %
5-40 SYRINGE (ML) INJECTION PRN
Status: CANCELLED | OUTPATIENT
Start: 2024-07-22

## 2024-07-22 RX ORDER — 0.9 % SODIUM CHLORIDE 0.9 %
2360 INTRAVENOUS SOLUTION INTRAVENOUS ONCE
Status: COMPLETED | OUTPATIENT
Start: 2024-07-22 | End: 2024-07-22

## 2024-07-22 RX ORDER — SODIUM CHLORIDE 0.9 % (FLUSH) 0.9 %
5-40 SYRINGE (ML) INJECTION PRN
Status: DISCONTINUED | OUTPATIENT
Start: 2024-07-22 | End: 2024-07-24 | Stop reason: HOSPADM

## 2024-07-22 RX ORDER — SODIUM CHLORIDE 0.9 % (FLUSH) 0.9 %
5-40 SYRINGE (ML) INJECTION PRN
Status: DISCONTINUED | OUTPATIENT
Start: 2024-07-22 | End: 2024-07-23 | Stop reason: HOSPADM

## 2024-07-22 RX ORDER — ACETAMINOPHEN 325 MG/1
650 TABLET ORAL EVERY 6 HOURS PRN
Status: DISCONTINUED | OUTPATIENT
Start: 2024-07-22 | End: 2024-07-24 | Stop reason: HOSPADM

## 2024-07-22 RX ORDER — ACETAMINOPHEN 650 MG/1
650 SUPPOSITORY RECTAL EVERY 6 HOURS PRN
Status: DISCONTINUED | OUTPATIENT
Start: 2024-07-22 | End: 2024-07-24 | Stop reason: HOSPADM

## 2024-07-22 RX ORDER — HYDROCODONE BITARTRATE AND ACETAMINOPHEN 5; 325 MG/1; MG/1
1 TABLET ORAL EVERY 4 HOURS PRN
Status: DISCONTINUED | OUTPATIENT
Start: 2024-07-22 | End: 2024-07-24 | Stop reason: HOSPADM

## 2024-07-22 RX ORDER — HEPARIN 100 UNIT/ML
500 SYRINGE INTRAVENOUS PRN
Status: DISCONTINUED | OUTPATIENT
Start: 2024-07-22 | End: 2024-07-23 | Stop reason: HOSPADM

## 2024-07-22 RX ORDER — HEPARIN 100 UNIT/ML
500 SYRINGE INTRAVENOUS PRN
Status: CANCELLED | OUTPATIENT
Start: 2024-07-22

## 2024-07-22 RX ORDER — CYCLOBENZAPRINE HCL 10 MG
10 TABLET ORAL 3 TIMES DAILY PRN
Status: DISCONTINUED | OUTPATIENT
Start: 2024-07-22 | End: 2024-07-22

## 2024-07-22 RX ORDER — SODIUM CHLORIDE 9 MG/ML
25 INJECTION, SOLUTION INTRAVENOUS PRN
OUTPATIENT
Start: 2024-07-22

## 2024-07-22 RX ORDER — KETOROLAC TROMETHAMINE 15 MG/ML
15 INJECTION, SOLUTION INTRAMUSCULAR; INTRAVENOUS ONCE
Status: COMPLETED | OUTPATIENT
Start: 2024-07-22 | End: 2024-07-22

## 2024-07-22 RX ORDER — SERTRALINE HYDROCHLORIDE 25 MG/1
25 TABLET, FILM COATED ORAL DAILY
Status: DISCONTINUED | OUTPATIENT
Start: 2024-07-22 | End: 2024-07-24 | Stop reason: HOSPADM

## 2024-07-22 RX ORDER — ONDANSETRON 2 MG/ML
4 INJECTION INTRAMUSCULAR; INTRAVENOUS EVERY 6 HOURS PRN
Status: DISCONTINUED | OUTPATIENT
Start: 2024-07-22 | End: 2024-07-24 | Stop reason: HOSPADM

## 2024-07-22 RX ORDER — POLYETHYLENE GLYCOL 3350 17 G/17G
17 POWDER, FOR SOLUTION ORAL DAILY PRN
Status: DISCONTINUED | OUTPATIENT
Start: 2024-07-22 | End: 2024-07-24 | Stop reason: HOSPADM

## 2024-07-22 RX ORDER — ENOXAPARIN SODIUM 100 MG/ML
30 INJECTION SUBCUTANEOUS 2 TIMES DAILY
Status: DISCONTINUED | OUTPATIENT
Start: 2024-07-22 | End: 2024-07-23

## 2024-07-22 RX ORDER — SODIUM CHLORIDE 0.9 % (FLUSH) 0.9 %
5-40 SYRINGE (ML) INJECTION EVERY 12 HOURS SCHEDULED
Status: DISCONTINUED | OUTPATIENT
Start: 2024-07-22 | End: 2024-07-24 | Stop reason: HOSPADM

## 2024-07-22 RX ORDER — SODIUM CHLORIDE 9 MG/ML
INJECTION, SOLUTION INTRAVENOUS PRN
Status: DISCONTINUED | OUTPATIENT
Start: 2024-07-22 | End: 2024-07-24 | Stop reason: HOSPADM

## 2024-07-22 RX ORDER — SODIUM CHLORIDE 9 MG/ML
INJECTION, SOLUTION INTRAVENOUS CONTINUOUS
Status: DISCONTINUED | OUTPATIENT
Start: 2024-07-22 | End: 2024-07-24 | Stop reason: HOSPADM

## 2024-07-22 RX ORDER — CYCLOBENZAPRINE HCL 10 MG
10 TABLET ORAL 3 TIMES DAILY PRN
Status: DISCONTINUED | OUTPATIENT
Start: 2024-07-22 | End: 2024-07-24 | Stop reason: HOSPADM

## 2024-07-22 RX ORDER — ONDANSETRON 4 MG/1
4 TABLET, ORALLY DISINTEGRATING ORAL EVERY 8 HOURS PRN
Status: DISCONTINUED | OUTPATIENT
Start: 2024-07-22 | End: 2024-07-24 | Stop reason: HOSPADM

## 2024-07-22 RX ADMIN — ENOXAPARIN SODIUM 30 MG: 100 INJECTION SUBCUTANEOUS at 22:58

## 2024-07-22 RX ADMIN — KETOROLAC TROMETHAMINE 15 MG: 15 INJECTION, SOLUTION INTRAMUSCULAR; INTRAVENOUS at 14:15

## 2024-07-22 RX ADMIN — SODIUM CHLORIDE 2360 ML: 9 INJECTION, SOLUTION INTRAVENOUS at 19:00

## 2024-07-22 RX ADMIN — SODIUM CHLORIDE, POTASSIUM CHLORIDE, SODIUM LACTATE AND CALCIUM CHLORIDE 1000 ML: 600; 310; 30; 20 INJECTION, SOLUTION INTRAVENOUS at 16:15

## 2024-07-22 RX ADMIN — HEPARIN 500 UNITS: 100 SYRINGE at 10:21

## 2024-07-22 RX ADMIN — CEFTRIAXONE SODIUM 1000 MG: 1 INJECTION, POWDER, FOR SOLUTION INTRAMUSCULAR; INTRAVENOUS at 18:59

## 2024-07-22 RX ADMIN — SODIUM CHLORIDE: 9 INJECTION, SOLUTION INTRAVENOUS at 22:51

## 2024-07-22 RX ADMIN — SODIUM CHLORIDE, PRESERVATIVE FREE 10 ML: 5 INJECTION INTRAVENOUS at 10:21

## 2024-07-22 ASSESSMENT — PAIN SCALES - GENERAL
PAINLEVEL_OUTOF10: 4
PAINLEVEL_OUTOF10: 9

## 2024-07-22 ASSESSMENT — PAIN - FUNCTIONAL ASSESSMENT: PAIN_FUNCTIONAL_ASSESSMENT: 0-10

## 2024-07-22 NOTE — ED NOTES
Pt presents to ED via walking through triage c/o left flank pain and issue with left nephrostomy. Pt reports decreased to no urine output of left nephro tube. Pt reports normal output of right neprho. Pt denies any blood in tube. Pt denies any fever or nausea/emesis. Pt reports decreased output began 2 days ago.  Pt reports planned to short chemotherapy in 1 day.  Pt is Aox4 and ambulatory.  Pt placed on full cardiac monitor, EKG completed.  Call light within reach.

## 2024-07-22 NOTE — TELEPHONE ENCOUNTER
Third call to schedule palliative care clinic appointment.    Left message on patient's voicemail with contact number to call to schedule palliative care clinic appointment and to obtain more information.     [facility] Palliative Care Coordinator  Susu ARTN, RN, ONN-CG  [facility] [place] [phone]/ [facility] [phone]/ [facility] [phone]

## 2024-07-22 NOTE — ED PROVIDER NOTES
SURGICAL / SOCIAL / FAMILY HISTORY      has a past medical history of Cancer (HCC), Obstructive uropathy, and Uterine mass.     has a past surgical history that includes Cholecystectomy; Dilation and curettage of uterus (2024); Cystoscopy w/ retrogrades (2024); Dilation and curettage of uterus (N/A, 2024); Cystoscopy (N/A, 2024); IR GUIDED NEPHROSTOMY CATH PLACEMENT LEFT (2024); IR GUIDED NEPHROSTOMY CATH PLACEMENT RIGHT (2024); Hysterectomy; US BIOPSY LYMPH NODE (2024); and IR PORT PLACEMENT > 5 YEARS (2024).    Social History     Socioeconomic History    Marital status:      Spouse name: Not on file    Number of children: Not on file    Years of education: Not on file    Highest education level: Not on file   Occupational History    Occupation: Bank    Tobacco Use    Smoking status: Former     Current packs/day: 0.00     Types: Cigarettes     Quit date: 2024     Years since quittin.1    Smokeless tobacco: Never   Substance and Sexual Activity    Alcohol use: Not Currently     Comment: Social    Drug use: Never    Sexual activity: Yes     Comment: Prior to diagnosis   Other Topics Concern    Not on file   Social History Narrative    Not on file     Social Determinants of Health     Financial Resource Strain: Not on file   Food Insecurity: No Food Insecurity (2024)    Hunger Vital Sign     Worried About Running Out of Food in the Last Year: Never true     Ran Out of Food in the Last Year: Never true   Transportation Needs: No Transportation Needs (2024)    PRAPARE - Transportation     Lack of Transportation (Medical): No     Lack of Transportation (Non-Medical): No   Physical Activity: Not on file   Stress: Not on file   Social Connections: Not on file   Intimate Partner Violence: Not on file   Housing Stability: Low Risk  (2024)    Housing Stability Vital Sign     Unable to Pay for Housing in the Last Year: No     Number of  Normocephalic and atraumatic.      Right Ear: External ear normal.      Left Ear: External ear normal.      Nose: Nose normal.      Mouth/Throat:      Mouth: Mucous membranes are moist.   Eyes:      Conjunctiva/sclera: Conjunctivae normal.   Cardiovascular:      Rate and Rhythm: Regular rhythm. Tachycardia present.      Pulses: Normal pulses.   Abdominal:      General: Abdomen is flat.      Palpations: Abdomen is soft.      Tenderness: There is no abdominal tenderness. There is left CVA tenderness. There is no right CVA tenderness.   Musculoskeletal:         General: Normal range of motion.   Skin:     General: Skin is warm and dry.      Findings: No erythema or rash.   Neurological:      General: No focal deficit present.      Mental Status: She is alert and oriented to person, place, and time.           DDX/DIAGNOSTIC RESULTS / EMERGENCY DEPARTMENT COURSE / MDM     Medical Decision Making  Patient is a 44-year-old female presenting due to concern for nephrostomy tube displacement versus dysfunction.  Upon examination patient appears well, appears comfortable saturating well on room air, afebrile with blood pressure within normal range and moderate tachycardia noted.  Abdomen is benign but there is a small area of possible serous infiltration of the subcutaneous tissue on the left side near the nephrostomy tube placement.  There is cloudy urine present in the right sided nephrostomy tube with some small amount of urine in the left side.  Have suspicion for possible nephrostomy tube blockage and will consult with urology for flushing versus speaking with IR for replacement.  Will obtain CBC and CMP, did review labs from earlier today at infusion center that did show significant increase in creatinine.  Patient somewhat chronically anemic.  Anticipate possible admission for observation versus IR procedure.    Amount and/or Complexity of Data Reviewed  Labs: ordered. Decision-making details documented in ED

## 2024-07-22 NOTE — ED PROVIDER NOTES
Handoff taken on the following patient from prior Attending Physician:    Pt Name: Evangelina King    PCP:  Barbie Ball MD         Attestation    I was available and discussed any additional care issues that arose and coordinated the management plans with the resident(s) caring for the patient during my duty period. Any areas of disagreement with resident’s documentation of care or procedures are noted on the chart. I was personally present for the key portions of any/all procedures during my duty period. I have documented in the chart those procedures where I was not present during the key portions.    Patient is a 44-year-old female with endometrial cancer on chemo and has bilateral nephrostomy tubes patient's tubes are seen on CT yesterday were in place however not draining euro tried flushing with some output no reoccluded patient not having drainage on reevaluation symptoms still present will likely need admission if unable to resolve symptoms      Kush Zuluaga DO  07/22/24 [id number]

## 2024-07-22 NOTE — H&P
[facility]  Office: [phone]  Miguel Leyva DO, Abdon Lopez DO, Raheel Olivas DO, Lanre Brown DO, Laura Davis MD, Rachana Riojas MD, Randy Ricketts MD, Traci Segundo MD,  Max Nicolas MD, Bhargav Martinez MD, Jaswinder Maxwell MD,  Freya Milian DO, Velvet Barrera MD, Dexter Greenfield MD, Ishan Leyva DO, Ana Palmer MD,  Damon Mello DO, Danielle Bruce MD, Iliana Hennessy MD, Chelsey Muller MD, Issac Peterson MD,  Phu Hernandez MD, Riley Layne MD, Ceferino Huitron MD, Jorje Ha MD, Néstor Quintero MD, Tyshawn Silveira MD, Nikhil Malcolm DO, Gopal Freire DO, Mk Anthony MD,  Yg Randall MD, Shirley Waterhouse, CNP,  Juani Moore CNP, Danny Christianson, CNP,  Haily Bejarano, DNP, Alesha Terrazas, CNP, Niki Isidro, CNP, Leti Medrano, CNP, Dahlia Guevara, CNP, Shirley Resendez, PA-C, Sangeetha Joaquin PA-C, Joann Mark, CNP, Leisa Hollingsworth, CNP, Ninfa Mandujano, CNP, Genia Brady, CNP, Bhumika Mueller, CNP, Ashtyn Winters, CNS, Cordelia Crump, CNP, Martha Maxwell CNP, Tracy Schwab, CNP         [facility]   IN-PATIENT SERVICE   [facility]    HISTORY AND PHYSICAL EXAMINATION            Date:   7/22/2024  Patient name:  Evangelina King  Date of admission:  7/22/2024  1:31 PM  MRN:   [id number]  Account:  [id number]  YOB: 1980  PCP:    Barbie Ball MD  Room:   21/21  Code Status:    Full Code    Chief Complaint:     Chief Complaint   Patient presents with    Flank Pain     left    Nephrostomy Tube Problem        History Obtained From:     Patient and medical records     History of Present Illness:     Evangelina King is a 44 y.o.  female with pmh cervical cancer diagnosed May 2024 s/p radiation therapy (awaiting chemotherapy initiation scheduled for tomorrow ), iron deficiency anemia status post Venofer and situational depression who presents with Flank Pain (left) and Nephrostomy Tube Problem    and is admitted to the  NP  7/22/2024  7:46 PM    Copy sent to Barbie Gonzalez MD

## 2024-07-22 NOTE — ED NOTES
ED to inpatient nurses report      Chief Complaint:  Chief Complaint   Patient presents with    Flank Pain     left    Nephrostomy Tube Problem      Present to ED from: Home    MOA:     LOC: alert and orientated to name, place, date  Mobility: Independent  Oxygen Baseline: Room air    Current needs required: Room air   Pending ED orders: N/A  Present condition: Tachycardia, Aox4, decreased to little output of left nephro tube    Why did the patient come to the ED?   Pt presents to ED via walking through triage c/o left flank pain and issue with left nephrostomy. Pt reports decreased to no urine output of left nephro tube. Pt reports normal output of right neprho. Pt denies any blood in tube. Pt denies any fever or nausea/emesis. Pt reports decreased output began 2 days ago.  Pt reports planned to short chemotherapy in 1 day.  Pt is Aox4 and ambulatory.  Pt placed on full cardiac monitor, EKG completed.  Call light within reach.     What is the plan? Admit  Any procedures or intervention occur? Urology consult, labs  Any safety concerns??    Mental Status:  Level of Consciousness: Alert (0)    Psych Assessment:   Psychosocial  Psychosocial (WDL): Within Defined Limits  Vital signs   Vitals:    07/22/24 1330 07/22/24 1629 07/22/24 1659 07/22/24 1715   BP: 132/74 106/60 118/75 99/60   Pulse:  (!) 118 (!) 114 (!) 110   Resp:       Temp:       TempSrc:       SpO2:  97%     Weight:            Vitals:  Patient Vitals for the past 24 hrs:   BP Temp Temp src Pulse Resp SpO2 Weight   07/22/24 1715 99/60 -- -- (!) 110 -- -- --   07/22/24 1659 118/75 -- -- (!) 114 -- -- --   07/22/24 1629 106/60 -- -- (!) 118 -- 97 % --   07/22/24 1330 132/74 -- -- -- -- -- --   07/22/24 1329 -- 97.6 °F (36.4 °C) Oral (!) 126 17 100 % 112 kg (246 lb 14.6 oz)      Visit Vitals  BP 99/60   Pulse (!) 110   Temp 97.6 °F (36.4 °C) (Oral)   Resp 17   Wt 112 kg (246 lb 14.6 oz)   SpO2 97%   BMI 38.67 kg/m²        LDAs:   Peripheral IV 07/22/24 Left  History     Socioeconomic History    Marital status:      Spouse name: None    Number of children: None    Years of education: None    Highest education level: None   Occupational History    Occupation: Bank    Tobacco Use    Smoking status: Former     Current packs/day: 0.00     Types: Cigarettes     Quit date: 2024     Years since quittin.1    Smokeless tobacco: Never   Substance and Sexual Activity    Alcohol use: Not Currently     Comment: Social    Drug use: Never    Sexual activity: Yes     Comment: Prior to diagnosis     Social Determinants of Health     Food Insecurity: No Food Insecurity (2024)    Hunger Vital Sign     Worried About Running Out of Food in the Last Year: Never true     Ran Out of Food in the Last Year: Never true   Transportation Needs: No Transportation Needs (2024)    PRAPARE - Transportation     Lack of Transportation (Medical): No     Lack of Transportation (Non-Medical): No   Housing Stability: Low Risk  (2024)    Housing Stability Vital Sign     Unable to Pay for Housing in the Last Year: No     Number of Places Lived in the Last Year: 1     Unstable Housing in the Last Year: No       FAMILY HISTORY       Family History   Adopted: Yes   Problem Relation Age of Onset    Lung Cancer Maternal Grandfather     Breast Cancer Other     Cancer Maternal Uncle        ALLERGIES     Morphine    CURRENT MEDICATIONS       Previous Medications    ACETAMINOPHEN (TYLENOL) 500 MG TABLET    Take 2 tablets by mouth every 6 hours as needed for Pain    ACETAMINOPHEN-CODEINE (TYLENOL/CODEINE #3) 300-30 MG PER TABLET    Take 1 tablet by mouth every 8 hours as needed for Pain for up to 14 days. Max Daily Amount: 3 tablets    CEPHALEXIN (KEFLEX) 500 MG CAPSULE    Take 1 capsule by mouth 4 times daily for 7 days    CYCLOBENZAPRINE (FLEXERIL) 10 MG TABLET    Take 1 tablet by mouth 3 times daily as needed for Muscle spasms    GABAPENTIN (NEURONTIN) 300 MG CAPSULE

## 2024-07-22 NOTE — ED PROVIDER NOTES
[facility]  Emergency Department  Emergency Medicine Resident Sign-out     Care of Evangelina King was assumed from Dr. Beach and is being seen for Flank Pain (left) and Nephrostomy Tube Problem    .  The patient's initial evaluation and plan have been discussed with the prior provider who initially evaluated the patient.     EMERGENCY DEPARTMENT COURSE / MEDICAL DECISION MAKING:       MEDICATIONS GIVEN:  Orders Placed This Encounter   Medications    ketorolac (TORADOL) injection 15 mg    lactated ringers bolus 1,000 mL    cefTRIAXone (ROCEPHIN) 1,000 mg in sodium chloride 0.9 % 50 mL IVPB (mini-bag)     Order Specific Question:   Antimicrobial Indications     Answer:   Urinary Tract Infection    sodium chloride 0.9 % bolus 2,360 mL    sertraline (ZOLOFT) tablet 25 mg    0.9 % sodium chloride infusion    sodium chloride flush 0.9 % injection 5-40 mL    sodium chloride flush 0.9 % injection 5-40 mL    0.9 % sodium chloride infusion    enoxaparin Sodium (LOVENOX) injection 30 mg     Order Specific Question:   Indication of Use     Answer:   Prophylaxis-DVT/PE    OR Linked Order Group     ondansetron (ZOFRAN-ODT) disintegrating tablet 4 mg     ondansetron (ZOFRAN) injection 4 mg    OR Linked Order Group     acetaminophen (TYLENOL) tablet 650 mg     acetaminophen (TYLENOL) suppository 650 mg    polyethylene glycol (GLYCOLAX) packet 17 g    cefTRIAXone (ROCEPHIN) 1,000 mg in sodium chloride 0.9 % 50 mL IVPB (mini-bag)     Order Specific Question:   Antimicrobial Indications     Answer:   Urinary Tract Infection     Order Specific Question:   UTI duration of therapy     Answer:   5 days    cyclobenzaprine (FLEXERIL) tablet 10 mg    HYDROcodone-acetaminophen (NORCO) 5-325 MG per tablet 1 tablet    DISCONTD: cyclobenzaprine (FLEXERIL) tablet 10 mg       LABS / RADIOLOGY:     Labs Reviewed   CBC WITH AUTO DIFFERENTIAL - Abnormal; Notable for the following components:       Result Value    Hemoglobin 9.1 (*)   Yes   CMP: Yes   PT/PTT: No   Blood Cultures x2(required): Yes   Urinalysis and Urine Culture: Yes   Lactate(required): Yes   Antibiotics Given (within 3 hours of sepsis identification, after blood cultures):  Yes    (If unable to obtain IV access for IV antibiotics within 3 hours of identification of sepsis, IM antibiotics is acceptable.)        Antibiotics were given prior to blood cultures obtained, because delay of antibiotics would be detrimental  to the patient.        If Lactate >2.0 MUST repeat within 6 hours      IV Fluid Bolus:  Is lactate > 4.0:  No      ED Course as of 07/22/24 2225 Mon Jul 22, 2024   1446 Lactic Acid, Whole Blood: 0.9 [HO]   1446 Hemoglobin Quant(!): 9.1 [HO]   1448 Creatinine(!): 2.9  2.8 this AM likely secondary to obstruction of neph tube   [HO]   1449 Urology was able to flush neph tube [HO]   1504 Reevaluated patient who reports that since the flush she has not had any other urine out, will see if we can replace patient's urine bag as there are clumps of sediment within the tubing. [HO]   1605 Reevaluated patient who reports poor output from the nephrostomy tube, will start patient on fluids and recheck in the next couple of hours after IR nurse did replace nephrostomy tube bag. [HO]   1823 Patient continues to be tachycardic, LESLIE.  Plan on admission after discussion with patient.  Patient agreeable. [AK]      ED Course User Index  [AK] Justen Kelley MD  [HO] Tanja Beach MD       OUTSTANDING TASKS / RECOMMENDATIONS:      Reevaluate  Dispo     FINAL IMPRESSION:     1. Malfunction of nephrostomy tube (HCC)    2. LESLIE (acute kidney injury) (HCC)        DISPOSITION:       DISPOSITION:  []  Discharge   []  Transfer -    [x]  Admission -     []  Against Medical Advice   []  Eloped   FOLLOW-UP: No follow-up provider specified.   DISCHARGE MEDICATIONS: Current Discharge Medication List             Justen Kelley MD  Emergency Medicine Resident  [facility]

## 2024-07-22 NOTE — ED PROVIDER NOTES
[facility] ED     Emergency Department     Faculty Attestation    I performed a history and physical examination of the patient and discussed management with the resident. I reviewed the resident’s note and agree with the documented findings and plan of care. Any areas of disagreement are noted on the chart. I was personally present for the key portions of any procedures. I have documented in the chart those procedures where I was not present during the key portions. I have reviewed the emergency nurses triage note. I agree with the chief complaint, past medical history, past surgical history, allergies, medications, social and family history as documented unless otherwise noted below. For Physician Assistant/ Nurse Practitioner cases/documentation I have personally evaluated this patient and have completed at least one if not all key elements of the E/M (history, physical exam, and MDM). Additional findings are as noted.    2:16 PM EDT    Patient with endometrial cancer presents with increased flank pain and decreased output from her left-sided nephrostomy tube.  Patient does have bilateral nephrostomy tubes but she says that the right side has been functioning like normal.  Patient was seen at [facility] yesterday and had a CT scan done at that time showing that the nephrostomy tubes were in the correct location.  However, patient had labs drawn today and was found to have an elevated creatinine.  Patient was then sent here by her oncologist for further workup and evaluation.  On my exam, patient is resting comfortably in the bed.  She is alert and oriented and answering questions appropriately.  There is mild tenderness with a small mass near the left nephrostomy insertion site.  There is no output within the left-sided nephrostomy bag and she says that that has not been changed since she was at [facility] over 24 hours ago.  Will repeat labs and treat patient's pain.  Will speak with urology  and possibly IR.    EKG Interpretation    Interpreted by emergency department physician    Rhythm: sinus tachycardia  Rate: 120-130  Axis: normal  Ectopy: none  Conduction: normal  ST Segments: normal  T Waves: normal  Q Waves: none    Clinical Impression: sinus tachycardia    MD Haily Christopher MD  Attending Emergency  Physician

## 2024-07-22 NOTE — PLAN OF CARE
Patient present with decreased urine output from left nephrostomy tube. Nephrostomy tube was disconnected from nephrostomy bag and urine was pouring out of tube. Bag was noted to be clogged. Bag was flushed and now draining properly. Patient states that when she got her nephrostomy tube exchanged, they did not give her a new bag.    Thank you for allowing us to take part in Ms. Evangelina King's care. Please notify urology with any additional concerns.

## 2024-07-22 NOTE — PROGRESS NOTES
VAD accessed per protocol with 3/4 inch 20 gauge wiseman needle. Flushes easy with good blood return.  Labs Drawn. Flushed with 10 ml of normal saline and 5 ml of heparin flush. D/C'd 3/4 inch wiseman needle and band aide applied.  Dr. Crowell's office notified that her left nephrostomy has not been draining the last 2 days and she is developing a noticeable lump just to the right of left urostomy insertion site in left lower back.  Urology nurse is contacting Dr. Crowell to see who needs to see her regarding this problem.  Dr. Lomeli notified of lab results of today.  Dr. Crowell's office notified of creat also and referred pt to go to [place] ED to have nephrostomy evaluated.

## 2024-07-23 ENCOUNTER — HOSPITAL ENCOUNTER (OUTPATIENT)
Dept: INFUSION THERAPY | Age: 44
Discharge: HOME | End: 2024-07-23

## 2024-07-23 ENCOUNTER — TELEPHONE (OUTPATIENT)
Dept: ONCOLOGY | Age: 44
End: 2024-07-23

## 2024-07-23 PROBLEM — N12 PYELONEPHRITIS OF LEFT KIDNEY: Status: ACTIVE | Noted: 2024-07-23

## 2024-07-23 LAB
ANION GAP SERPL CALCULATED.3IONS-SCNC: 12 MMOL/L (ref 9–16)
ANION GAP SERPL CALCULATED.3IONS-SCNC: 13 MMOL/L (ref 9–16)
BASOPHILS # BLD: 0.04 K/UL (ref 0–0.2)
BASOPHILS NFR BLD: 1 % (ref 0–2)
BUN SERPL-MCNC: 21 MG/DL (ref 6–20)
BUN SERPL-MCNC: 23 MG/DL (ref 6–20)
CALCIUM SERPL-MCNC: 8.6 MG/DL (ref 8.6–10.4)
CALCIUM SERPL-MCNC: 8.6 MG/DL (ref 8.6–10.4)
CHLORIDE SERPL-SCNC: 102 MMOL/L (ref 98–107)
CHLORIDE SERPL-SCNC: 102 MMOL/L (ref 98–107)
CO2 SERPL-SCNC: 21 MMOL/L (ref 20–31)
CO2 SERPL-SCNC: 21 MMOL/L (ref 20–31)
CREAT SERPL-MCNC: 1.8 MG/DL (ref 0.5–0.9)
CREAT SERPL-MCNC: 2.1 MG/DL (ref 0.5–0.9)
EOSINOPHIL # BLD: 0.21 K/UL (ref 0–0.44)
EOSINOPHILS RELATIVE PERCENT: 5 % (ref 1–4)
ERYTHROCYTE [DISTWIDTH] IN BLOOD BY AUTOMATED COUNT: 21.4 % (ref 11.8–14.4)
FERRITIN SERPL-MCNC: 483 NG/ML (ref 13–150)
GFR, ESTIMATED: 29 ML/MIN/1.73M2
GFR, ESTIMATED: 35 ML/MIN/1.73M2
GLUCOSE SERPL-MCNC: 111 MG/DL (ref 74–99)
GLUCOSE SERPL-MCNC: 93 MG/DL (ref 74–99)
HCT VFR BLD AUTO: 28.6 % (ref 36.3–47.1)
HGB BLD-MCNC: 8.6 G/DL (ref 11.9–15.1)
IMM GRANULOCYTES # BLD AUTO: 0.04 K/UL (ref 0–0.3)
IMM GRANULOCYTES NFR BLD: 1 %
IRON SATN MFR SERPL: 21 % (ref 20–55)
IRON SERPL-MCNC: 29 UG/DL (ref 37–145)
LYMPHOCYTES NFR BLD: 0.38 K/UL (ref 1.1–3.7)
LYMPHOCYTES RELATIVE PERCENT: 9 % (ref 24–43)
MCH RBC QN AUTO: 23 PG (ref 25.2–33.5)
MCHC RBC AUTO-ENTMCNC: 30.1 G/DL (ref 28.4–34.8)
MCV RBC AUTO: 76.5 FL (ref 82.6–102.9)
MONOCYTES NFR BLD: 0.55 K/UL (ref 0.1–1.2)
MONOCYTES NFR BLD: 13 % (ref 3–12)
MORPHOLOGY: ABNORMAL
MORPHOLOGY: ABNORMAL
NEUTROPHILS NFR BLD: 71 % (ref 36–65)
NEUTS SEG NFR BLD: 2.98 K/UL (ref 1.5–8.1)
NRBC BLD-RTO: 0 PER 100 WBC
PLATELET # BLD AUTO: 184 K/UL (ref 138–453)
PMV BLD AUTO: 8.4 FL (ref 8.1–13.5)
POTASSIUM SERPL-SCNC: 4.2 MMOL/L (ref 3.7–5.3)
POTASSIUM SERPL-SCNC: 4.2 MMOL/L (ref 3.7–5.3)
PROCALCITONIN SERPL-MCNC: 0.44 NG/ML (ref 0–0.09)
RBC # BLD AUTO: 3.74 M/UL (ref 3.95–5.11)
SODIUM SERPL-SCNC: 135 MMOL/L (ref 136–145)
SODIUM SERPL-SCNC: 136 MMOL/L (ref 136–145)
TIBC SERPL-MCNC: 135 UG/DL (ref 250–450)
UNSATURATED IRON BINDING CAPACITY: 106 UG/DL (ref 112–347)
WBC OTHER # BLD: 4.2 K/UL (ref 3.5–11.3)

## 2024-07-23 PROCEDURE — 83540 ASSAY OF IRON: CPT

## 2024-07-23 PROCEDURE — 80048 BASIC METABOLIC PNL TOTAL CA: CPT

## 2024-07-23 PROCEDURE — 6360000002 HC RX W HCPCS: Performed by: NURSE PRACTITIONER

## 2024-07-23 PROCEDURE — 85025 COMPLETE CBC W/AUTO DIFF WBC: CPT

## 2024-07-23 PROCEDURE — 1200000000 HC SEMI PRIVATE

## 2024-07-23 PROCEDURE — 2580000003 HC RX 258: Performed by: INTERNAL MEDICINE

## 2024-07-23 PROCEDURE — 36415 COLL VENOUS BLD VENIPUNCTURE: CPT

## 2024-07-23 PROCEDURE — 99222 1ST HOSP IP/OBS MODERATE 55: CPT | Performed by: INTERNAL MEDICINE

## 2024-07-23 PROCEDURE — 84145 PROCALCITONIN (PCT): CPT

## 2024-07-23 PROCEDURE — 6360000002 HC RX W HCPCS: Performed by: INTERNAL MEDICINE

## 2024-07-23 PROCEDURE — 2580000003 HC RX 258: Performed by: NURSE PRACTITIONER

## 2024-07-23 PROCEDURE — 6370000000 HC RX 637 (ALT 250 FOR IP): Performed by: NURSE PRACTITIONER

## 2024-07-23 PROCEDURE — 83550 IRON BINDING TEST: CPT

## 2024-07-23 PROCEDURE — 82728 ASSAY OF FERRITIN: CPT

## 2024-07-23 PROCEDURE — 99233 SBSQ HOSP IP/OBS HIGH 50: CPT | Performed by: STUDENT IN AN ORGANIZED HEALTH CARE EDUCATION/TRAINING PROGRAM

## 2024-07-23 RX ORDER — HEPARIN SODIUM 5000 [USP'U]/ML
5000 INJECTION, SOLUTION INTRAVENOUS; SUBCUTANEOUS EVERY 8 HOURS SCHEDULED
Status: DISCONTINUED | OUTPATIENT
Start: 2024-07-23 | End: 2024-07-24 | Stop reason: HOSPADM

## 2024-07-23 RX ADMIN — HYDROCODONE BITARTRATE AND ACETAMINOPHEN 1 TABLET: 5; 325 TABLET ORAL at 00:00

## 2024-07-23 RX ADMIN — HEPARIN SODIUM 5000 UNITS: 5000 INJECTION INTRAVENOUS; SUBCUTANEOUS at 22:52

## 2024-07-23 RX ADMIN — SODIUM CHLORIDE, PRESERVATIVE FREE 10 ML: 5 INJECTION INTRAVENOUS at 09:42

## 2024-07-23 RX ADMIN — SERTRALINE HYDROCHLORIDE 25 MG: 25 TABLET ORAL at 09:42

## 2024-07-23 RX ADMIN — ONDANSETRON 4 MG: 4 TABLET, ORALLY DISINTEGRATING ORAL at 21:05

## 2024-07-23 RX ADMIN — HEPARIN SODIUM 5000 UNITS: 5000 INJECTION INTRAVENOUS; SUBCUTANEOUS at 14:35

## 2024-07-23 RX ADMIN — ONDANSETRON 4 MG: 2 INJECTION INTRAMUSCULAR; INTRAVENOUS at 14:35

## 2024-07-23 RX ADMIN — CYCLOBENZAPRINE 10 MG: 10 TABLET, FILM COATED ORAL at 00:00

## 2024-07-23 RX ADMIN — PIPERACILLIN AND TAZOBACTAM 3375 MG: 3; .375 INJECTION, POWDER, LYOPHILIZED, FOR SOLUTION INTRAVENOUS at 17:17

## 2024-07-23 RX ADMIN — HEPARIN SODIUM 5000 UNITS: 5000 INJECTION INTRAVENOUS; SUBCUTANEOUS at 05:34

## 2024-07-23 RX ADMIN — ONDANSETRON 4 MG: 2 INJECTION INTRAMUSCULAR; INTRAVENOUS at 07:52

## 2024-07-23 ASSESSMENT — ENCOUNTER SYMPTOMS
WHEEZING: 0
VOMITING: 0
ABDOMINAL DISTENTION: 0
RHINORRHEA: 0
NAUSEA: 0
EYE DISCHARGE: 0
TROUBLE SWALLOWING: 0
SHORTNESS OF BREATH: 0
BACK PAIN: 0
COUGH: 0
ABDOMINAL PAIN: 0
EYE PAIN: 0
CONSTIPATION: 0
DIARRHEA: 0

## 2024-07-23 ASSESSMENT — PAIN SCALES - GENERAL
PAINLEVEL_OUTOF10: 4
PAINLEVEL_OUTOF10: 1

## 2024-07-23 NOTE — PLAN OF CARE
Problem: Discharge Planning  Goal: Discharge to home or other facility with appropriate resources  7/23/2024 1805 by Shilpa Muñoz RN  Outcome: Progressing  7/23/2024 0638 by Rowena Miller RN  Outcome: Progressing     Problem: Pain  Goal: Verbalizes/displays adequate comfort level or baseline comfort level  7/23/2024 1805 by Shilpa Muñoz RN  Outcome: Progressing  7/23/2024 0638 by Rowena Miller RN  Outcome: Progressing

## 2024-07-23 NOTE — CONSULTS
Renal Consult Note    Patient :  Evangelina King; 44 y.o. MRN# [id number]  Location:  0316/0316-01  Attending:  Issac Peterson MD  Admit Date:  7/22/2024   Hospital Day: 1    Reason for Consult:     Asked by Issac Padilla MD to see for LESLIE/Elevated Creatinine.    History Obtained From:     Patient/chart    History of Present Illness:     Evangelina King; 44 y.o. female with past medical history as mentioned below.   Known history of metastatic cervical cancer with mets to the uterus, lymph nodes, base of the bladder diagnosed in May 2024.  She also had ureteral involvement and had developed obstructive uropathy, intraureteral stents were tried which were not successful.  She  thereafter underwent bilateral nephrostomy tube placements which are being changed every 6 to 8 weeks.  Last exchange was in July 2024.  She has undergone 1 months worth of radiation therapy.  Is now planning to initiate chemo/immunotherapy.  She also has an element of chronic kidney disease likely from chronic interstitial nephritis baseline 1.2-1.3.  She tells me about a week or so ago she started noticing that the left nephrostomy tube which is her good tube had decreased drainage and a lot of sediment (She tells me her left tube drains much better than the right one, usually). Finally noticed that the tube was not draining at all.  Right side continues to drain well.  She also started noticing onset of pain and discomfort in the left flank area.  She was seen in [place] ER underwent CT abdomen with IV contrast 7/21/2024.  Creatinine prior to the procedure was 1.3.  Subsequently was admitted to [facility] as the tube was not draining on the left side.  Lab work showed a creatinine of 2.2 which peaked up to 2.9.  Urology was consulted.  The left nephrostomy tube was then disconnected from the drainage system and she had brisk urine output subsequently.  Since then urine output has continued to be good.  Creatinine is down to 2.1.  IV  07/21/2024 03:45 AM    MUCUS 3+ 07/21/2024 03:45 AM    TRICHOMONAS NOT REPORTED 06/13/2019 11:37 PM    YEAST NOT REPORTED 06/13/2019 11:37 PM    BACTERIA MANY 07/21/2024 03:45 AM    LEUKOCYTESUR 2+ 07/21/2024 03:45 AM    UROBILINOGEN Normal 07/21/2024 03:45 AM    BILIRUBINUR NEGATIVE 07/21/2024 03:45 AM    GLUCOSEU NEGATIVE 07/21/2024 03:45 AM    KETUA TRACE 07/21/2024 03:45 AM    AMORPHOUS NOT REPORTED 06/13/2019 11:37 PM     Urine Sodium:   No components found for: \"KAN\"  Urine Potassium:  No results found for: \"KUR\"  Urine Chloride:  No results found for: \"CLUR\"  Urine Osmolarity: No results found for: \"OSMOU\"  Urine Protein:   No results found for: \"TPU\"  Urine Creatinine:   No results found for: \"LABCREA\"  UPC:     Urine Eosinophils:  No components found for: \"UEOS\"    Radiology:     CXR:     Assessment:     1. Acute Kidney Injury: Secondary to nephrotoxic ATN from contrast exposure, remote obstructive uropathy contributing as well.  Had decreased drainage from the left nephrostomy tube likely from increased sediment in the drainage system.  Her baseline creatinine is 1.2-1.3 range it peaked up to 2.9 now resolving.  Creatinine down to 2.1 this morning.  Urine output increased since the nephrostomy tube was unplugged.  2.  Obstructive uropathy related to left nephrostomy tube obstruction, left nephrostomy tube normally drains better than the right.  3.  Metastatic cervical cancer status post radiation therapy plan to initiate immunotherapy/chemotherapy soon  4.  Morbid obesity    Plan:   1.  Continue normal saline at 100 mill an hour can be discontinued at time of discharge  2.  Renal function is improving patient can be discharged from nephrology standpoint  3.  Continue nephrostomy tube exchange by IR as before.  4.  Will check BMP 1 week after discharge  5.  Outpatient follow-up with nephrology in 2 to 3 weeks in [place].  6.  Repeat BMP in 6 hours to make sure renal function is continuing to improve  7.

## 2024-07-23 NOTE — PROGRESS NOTES
Physician Progress Note      PATIENT:               YARON DEJESUS  CSN #:                  [id number]  :                       1980  ADMIT DATE:       2024 1:31 PM  DISCH DATE:  RESPONDING  PROVIDER #:        Issac Hoskins MD          QUERY TEXT:    Pt admitted with UTI.  Pt noted to have a malfunctioning/ blockage of   nephrostomy tube. Noted documentation of, \"cultures from [place] grew [facility]   pneumonia and Enterococcus faecalis\" in the [facility] progress   notes. If possible, please document in the progress notes and discharge   summary if you are evaluating and/or treating any of the following:    The medical record reflects the following:  Risk Factors: nephrostomy  Clinical Indicators: documentation of cultures from [place] grew [facility] pneumonia   and Enterococcus faecalis; in the [facility] progress notes.  Treatment: Rocephin, Nephrostomy tube exchange, Monitor I & O and vital signs    Thank you for your time, Pattie GOLDBERG, RN CDS. If you have questions, please   call [phone] (V- 7a-3p).  Options provided:  -- UTI due to nephrostomy tube  -- UTI is related to malfunctioning nephrostomy tube  -- UTI not due to nephrostomy tube  -- Other - I will add my own diagnosis  -- Disagree - Not applicable / Not valid  -- Disagree - Clinically unable to determine / Unknown  -- Refer to Clinical Documentation Reviewer    PROVIDER RESPONSE TEXT:    UTI is due to nephrostomy tube.    Query created by: Pattie Ayala on 2024 2:20 PM      Electronically signed by:  Issac Hoskins MD 2024 3:02 PM

## 2024-07-23 NOTE — CONSULTS
Zhou Tang, Familia, Michelle, Mervat, Max, Maritza Jr.  Urology Consult      Patient:  Evangelina King  MRN: [id number]  YOB: 1980    CHIEF COMPLAINT: Malfunctioning left nephrostomy tube    HISTORY OF PRESENT ILLNESS:   The patient is a 44 y.o. female with a history of cervical cancer, diagnosed in May 2024, status post radiation therapy and initiating chemotherapy for the next year, initially presented with bilateral ureteral obstruction secondary to cancer, underwent placement of bilateral nephrostomy tubes in May, recently had them exchanged on 7/5.  She presented to the ED yesterday, urology evaluated patient and noted that the nephrostomy tube was disconnected from the nephrostomy bag and the bag was noted to be clogged, the bag was flushed, 18-gauge needle placed through tubing to unclog material, after this, good passage of urine noted bilaterally, pain improved.  Per interventional radiology notes, nephrostomy tubes need to be exchanged every 6 to 8 weeks.  On evaluation this morning, patient is pain-free, afebrile, vital signs stable, urine is draining well.  She did have an acute kidney injury with a creatinine of 2.9 yesterday, baseline 1.3.  She does have a positive urine culture with Enterococcus faecalis and Klebsiella, currently on Rocephin.    Patient's old records, notes and chart reviewed and summarized above.    Past Medical History:    Past Medical History:   Diagnosis Date    Cancer (HCC)     14 years ago per patient    Obstructive uropathy     Uterine mass        Past Surgical History:    Past Surgical History:   Procedure Laterality Date    CHOLECYSTECTOMY      CYSTOSCOPY N/A 05/20/2024    CYSTOSCOPY RETROGRADE PYELOGRAM, BLADDER BIOPSY performed by Ludin Barbosa MD at [facility]    CYSTOSCOPY W/ RETROGRADES  05/20/2024    CYSTOSCOPY RETROGRADE PYELOGRAM, BLADDER BIOPSY    DILATION AND CURETTAGE OF UTERUS  05/20/2024    DILATATION AND CURETTAGE HYSTEROSCOPY, VAGINAL /  largest left periaortic  lymph node now measures 18 x 14 mm, previously measured 23 x 17 mm, axial  image 89, series 2.     There is a new fluid collection on the left side of the pelvis measuring  approximately 9 x 8 cm that is of uncertain significance.     Bones/Soft Tissues: Unremarkable     IMPRESSION:  1. Bilateral percutaneous nephrostomy tubes in appropriate positions. No  evidence of hydronephrosis on the right.  Mild left hydronephrosis is is  stable.  2. Markedly enlarged uterus with air in the endometrial cavity.  3. New fluid collection on the left side of the pelvis measuring  approximately 9 x 8 cm that is of uncertain significance.  4. Retroperitoneal lymphadenopathy appear less prominent when compared to the  previous study.    Assessment and Plan   Impression:  44-year-old female with Bilateral ureteral obstruction secondary to cervical malignancy, status post nephrostomy tube placement in May, 2024 and recent exchange 2 weeks ago, presenting to the ED with left nephrostomy tube blockage, able to be irrigated per urology, tubes are bilaterally draining well now, clinically doing well, also has acute kidney injury which we will monitor.    Plan:  -Trend clinically with current antibiotics, follow-up final cultures  -Follow-up creatinine, would expect decreased to jagdish with properly draining nephrostomy tubes  -Follow-up urine output bilaterally  -Patient will need follow-up per interventional radiology for nephrostomy tube exchange in 6 to 8 weeks, recommend that IR provides patient with proper supplies to change her dressings and bags    Thank you for involving us in the care of Evangelina King. Should you have any questions, please do not hesitate to contact us at any time.    Edward Ruggiero MD  Urology Resident, PGY-4

## 2024-07-23 NOTE — TELEPHONE ENCOUNTER
Name: Evangelina King  : 1980  MRN: [id number]    Oncology Navigation Follow-Up Note    Contact Type:  Telephone    Notes: Received Epic alert pt admitted.  Upon review of chart noted pt admitted @ [facility] dx LESLIE.  Will continue to follow.      Electronically signed by Jessa Skaggs RN on 2024 at 7:39 AM

## 2024-07-23 NOTE — CONSULTS
[facility] -   Infectious diseases evaluation  admission date 7/22/2024    reason for consultation:   Klebsiella UTI, E Faecalis UTI, chronic b/l Nephrostomy tube      Impression :   Current:  Bilateral ureteral obstruction secondary to cervical invasive cervical cancer   Chronic B/L nephrostomy, since 05/24, most recent exchange 07/05  Left PCNT malfunction, resolved once flushed  Klebsiella and enterococcus  R pyelonephritis 07/21   Kleb Oro-sensi w/ exception of Ampicillin and Nitro  Enterococcus sensi pend   Invasive cervical cancer w/ mets 05/24 s/p radiation, will start chemo 07/23 via R port   LESLIE 2/2 obstructive uropathy   Mild L hydro 2/2 obstructive uropathy   Hx of Clostridium Ramosum, Actinobaculum Schaalii bacteremia 05/18    Other:    Discussion / summary of stay / plan of care/ Recommendations:   Both PCNT are cloudy - L side better after was flushed - cx taken only from the R side   Cloudy iurien suspected nto be from the sloughed mucosa related to the rxtx  HENCE:   Stop  Rocephin - 7/23 start zosyn to cover all urine organisms -  Watch for urine clearing in the bilat PCNT  Wait until final culture sensitivities in am  Hoping for DC on po AB   Last Bilat Nephrostomy tube exchange was 7/2024 - next due  per IR outpt     Infection Control Recommendations   [place] Precautions    Antimicrobial Stewardship Recommendations   Simplification of therapy  Targeted therapy    History of Present Illness:   Initial history:  Evangelina King is a 44 y.o.-year-old female with past medical history of cervical cancer diagnosed May 2024 status post radiation therapy, awaiting chemotherapy initiation, iron deficiency anemia, and depression presented to the [place] ED with complaints of minimal output from left nephrostomy tube and swelling like lump around the site where the left nephrostomy tube entered.  Patient is aware of how much output she obtains every night

## 2024-07-23 NOTE — TELEPHONE ENCOUNTER
Patient is currently admitted at [facility]. Just wanted to make sure that you were aware.

## 2024-07-23 NOTE — PROGRESS NOTES
[facility]  Office: [phone]  Miguel Leyva DO, Abdon Lopez DO, Raheel Olivas DO, Lanre Brown DO, Laura Davis MD, Rachana Riojas MD, Randy Ricketts MD, Traci Segundo MD,  Max Nicolas MD, Bhargav Martinez MD, Jaswinder Maxwell MD,  Freya Milian DO, Velvet Barrera MD, Dexter Greenfield MD, Ishan Leyva DO, Ana Palmer MD,  Damon Mello DO, Danielle Bruce MD, Iliana Hennessy MD, Chelsey Muller MD, Issac Peterson MD,  Phu Hernandez MD, Riley Layne MD, Ceferino Huitron MD, Jorje Ha MD, Néstor Quintero MD, Tyshawn Silveira MD, Nikhil Malcolm DO, Gopal Freire DO, Mk Anthony MD,  Yg Randall MD, Shirley Waterhouse, CNP,  Juani Moore CNP, Danny Christianson, CNP,  Haily Bejarano, DNP, Alesha Terrazas, CNP, Niki Isidro, CNP, Leti Medrano, CNP, Dahlia Guevara, CNP, Shirley Resendez, PA-C, Sangeetha Joaquin PA-C, Joann Mark, CNP, Leisa Hollingsworth, CNP, Ninfa Mandujano, CNP, Genia Brady, CNP, Bhumika Mueller, CNP, Ashtyn Winters, CNS, Cordelia Crump, CNP, Martha Maxwell CNP, Tracy Schwab, CNP         [facility]   IN-PATIENT SERVICE   [facility]    Progress Note    7/23/2024    10:41 AM    Name:   Evangelina King  MRN:     [id number]     Acct:      [id number]   Room:   0316/0316-01   Day:  1  Admit Date:  7/22/2024  1:31 PM    PCP:   Barbie Ball MD  Code Status:  Full Code    Subjective:     C/C:   Chief Complaint   Patient presents with    Flank Pain     left    Nephrostomy Tube Problem      Interval History Status: improved.   Patient seen and examined bedside.  Hemodynamically stable.  Bilateral nephrostomy tubes draining well.  Renal function improving.  Creatinine 2.1.  Consulted nephrology as well.  Continued on Rocephin.  Patient was tearful and requesting discharge.  Questions concerns answered to patient's satisfaction.  Will await nephrology evaluation for clearance for discharge.    Brief History:     44-year-old female with  urology recommendations. .     Depression - on zoloft    Dvt ppx- s/c heparin    Issac Peterson MD  7/23/2024  10:41 AM

## 2024-07-23 NOTE — CARE COORDINATION
Case Management Assessment  Initial Evaluation    Date/Time of Evaluation: 7/23/2024 8:56 AM  Assessment Completed by: Analia Heller    If patient is discharged prior to next notation, then this note serves as note for discharge by case management.    Patient Name: Evangelina King                   YOB: 1980  Diagnosis: LESLIE (acute kidney injury) (HCC) [N17.9]  Malfunction of nephrostomy tube (HCC) [T83.098A]                   Date / Time: 7/22/2024  1:31 PM    Patient Admission Status: Inpatient   Readmission Risk (Low < 19, Mod (19-27), High > 27): Readmission Risk Score: 19.3    Current PCP: Barbie Ball MD  PCP verified by CM? (P) Yes (Dr Barbie Ball)    Chart Reviewed: Yes      History Provided by: (P) Patient  Patient Orientation: (P) Alert and Oriented, Person, Place, Situation, Self    Patient Cognition: (P) Alert    Hospitalization in the last 30 days (Readmission):  No    If yes, Readmission Assessment in CM Navigator will be completed.    Advance Directives:      Code Status: Full Code   Patient's Primary Decision Maker is:        Discharge Planning:    Patient lives with: (P) Spouse/Significant Other Type of Home: (P) House  Primary Care Giver: (P) Self  Patient Support Systems include: (P) Spouse/Significant Other   Current Financial resources: (P) Other (Comment) (BCBS insurance)  Current community resources: (P) None  Current services prior to admission: (P) None            Current DME:              Type of Home Care services:  (P) None    ADLS  Prior functional level: (P) Independent in ADLs/IADLs  Current functional level: (P) Independent in ADLs/IADLs    PT AM-PAC:   /24  OT AM-PAC:   /24    Family can provide assistance at DC: (P) Yes  Would you like Case Management to discuss the discharge plan with any other family members/significant others, and if so, who? (P) No  Plans to Return to Present Housing: (P) Yes  Other Identified Issues/Barriers to RETURNING to current housing:

## 2024-07-24 VITALS
RESPIRATION RATE: 18 BRPM | HEIGHT: 67 IN | OXYGEN SATURATION: 97 % | DIASTOLIC BLOOD PRESSURE: 81 MMHG | WEIGHT: 246.91 LBS | SYSTOLIC BLOOD PRESSURE: 136 MMHG | BODY MASS INDEX: 38.75 KG/M2 | TEMPERATURE: 97.7 F | HEART RATE: 98 BPM

## 2024-07-24 LAB
ANION GAP SERPL CALCULATED.3IONS-SCNC: 11 MMOL/L (ref 9–16)
BASOPHILS # BLD: 0.04 K/UL (ref 0–0.2)
BASOPHILS NFR BLD: 1 % (ref 0–2)
BUN SERPL-MCNC: 16 MG/DL (ref 6–20)
CALCIUM SERPL-MCNC: 8.8 MG/DL (ref 8.6–10.4)
CHLORIDE SERPL-SCNC: 106 MMOL/L (ref 98–107)
CO2 SERPL-SCNC: 20 MMOL/L (ref 20–31)
CREAT SERPL-MCNC: 1.5 MG/DL (ref 0.5–0.9)
EKG ATRIAL RATE: 122 BPM
EKG P AXIS: 33 DEGREES
EKG P-R INTERVAL: 116 MS
EKG Q-T INTERVAL: 300 MS
EKG QRS DURATION: 82 MS
EKG QTC CALCULATION (BAZETT): 427 MS
EKG R AXIS: 6 DEGREES
EKG T AXIS: 7 DEGREES
EKG VENTRICULAR RATE: 122 BPM
EOSINOPHIL # BLD: 0.16 K/UL (ref 0–0.44)
EOSINOPHILS RELATIVE PERCENT: 4 % (ref 1–4)
ERYTHROCYTE [DISTWIDTH] IN BLOOD BY AUTOMATED COUNT: 21.1 % (ref 11.8–14.4)
GFR, ESTIMATED: 44 ML/MIN/1.73M2
GLUCOSE SERPL-MCNC: 123 MG/DL (ref 74–99)
HCT VFR BLD AUTO: 28.4 % (ref 36.3–47.1)
HGB BLD-MCNC: 9 G/DL (ref 11.9–15.1)
IMM GRANULOCYTES # BLD AUTO: 0.04 K/UL (ref 0–0.3)
IMM GRANULOCYTES NFR BLD: 1 %
LYMPHOCYTES NFR BLD: 0.52 K/UL (ref 1.1–3.7)
LYMPHOCYTES RELATIVE PERCENT: 13 % (ref 24–43)
MCH RBC QN AUTO: 22.8 PG (ref 25.2–33.5)
MCHC RBC AUTO-ENTMCNC: 31.7 G/DL (ref 28.4–34.8)
MCV RBC AUTO: 72.1 FL (ref 82.6–102.9)
MICROORGANISM SPEC CULT: ABNORMAL
MICROORGANISM SPEC CULT: ABNORMAL
MONOCYTES NFR BLD: 0.44 K/UL (ref 0.1–1.2)
MONOCYTES NFR BLD: 11 % (ref 3–12)
MORPHOLOGY: ABNORMAL
MORPHOLOGY: ABNORMAL
NEUTROPHILS NFR BLD: 70 % (ref 36–65)
NEUTS SEG NFR BLD: 2.8 K/UL (ref 1.5–8.1)
NRBC BLD-RTO: 0.5 PER 100 WBC
PLATELET # BLD AUTO: ABNORMAL K/UL (ref 138–453)
PLATELET, FLUORESCENCE: ABNORMAL K/UL (ref 138–453)
POTASSIUM SERPL-SCNC: 4.7 MMOL/L (ref 3.7–5.3)
RBC # BLD AUTO: 3.94 M/UL (ref 3.95–5.11)
SODIUM SERPL-SCNC: 137 MMOL/L (ref 136–145)
SPECIMEN DESCRIPTION: ABNORMAL
WBC OTHER # BLD: 4 K/UL (ref 3.5–11.3)

## 2024-07-24 PROCEDURE — 6360000002 HC RX W HCPCS: Performed by: NURSE PRACTITIONER

## 2024-07-24 PROCEDURE — 6370000000 HC RX 637 (ALT 250 FOR IP): Performed by: NURSE PRACTITIONER

## 2024-07-24 PROCEDURE — 6360000002 HC RX W HCPCS: Performed by: INTERNAL MEDICINE

## 2024-07-24 PROCEDURE — 80048 BASIC METABOLIC PNL TOTAL CA: CPT

## 2024-07-24 PROCEDURE — 85025 COMPLETE CBC W/AUTO DIFF WBC: CPT

## 2024-07-24 PROCEDURE — 99232 SBSQ HOSP IP/OBS MODERATE 35: CPT | Performed by: INTERNAL MEDICINE

## 2024-07-24 PROCEDURE — 93005 ELECTROCARDIOGRAM TRACING: CPT | Performed by: STUDENT IN AN ORGANIZED HEALTH CARE EDUCATION/TRAINING PROGRAM

## 2024-07-24 PROCEDURE — 36415 COLL VENOUS BLD VENIPUNCTURE: CPT

## 2024-07-24 PROCEDURE — 99239 HOSP IP/OBS DSCHRG MGMT >30: CPT | Performed by: STUDENT IN AN ORGANIZED HEALTH CARE EDUCATION/TRAINING PROGRAM

## 2024-07-24 PROCEDURE — 85055 RETICULATED PLATELET ASSAY: CPT

## 2024-07-24 PROCEDURE — 2580000003 HC RX 258: Performed by: INTERNAL MEDICINE

## 2024-07-24 PROCEDURE — 6370000000 HC RX 637 (ALT 250 FOR IP): Performed by: INTERNAL MEDICINE

## 2024-07-24 RX ORDER — LEVOFLOXACIN 500 MG/1
500 TABLET, FILM COATED ORAL DAILY
Qty: 10 TABLET | Refills: 0 | Status: SHIPPED | OUTPATIENT
Start: 2024-07-24 | End: 2024-08-03

## 2024-07-24 RX ORDER — HYDROCODONE BITARTRATE AND ACETAMINOPHEN 5; 325 MG/1; MG/1
1 TABLET ORAL EVERY 8 HOURS PRN
Qty: 9 TABLET | Refills: 0 | Status: SHIPPED | OUTPATIENT
Start: 2024-07-24 | End: 2024-07-27

## 2024-07-24 RX ORDER — LEVOFLOXACIN 500 MG/1
500 TABLET, FILM COATED ORAL DAILY
Status: DISCONTINUED | OUTPATIENT
Start: 2024-07-24 | End: 2024-07-24 | Stop reason: HOSPADM

## 2024-07-24 RX ORDER — CALCIUM CARBONATE 500 MG/1
500 TABLET, CHEWABLE ORAL 3 TIMES DAILY PRN
Status: DISCONTINUED | OUTPATIENT
Start: 2024-07-24 | End: 2024-07-24 | Stop reason: HOSPADM

## 2024-07-24 RX ADMIN — PIPERACILLIN AND TAZOBACTAM 3375 MG: 3; .375 INJECTION, POWDER, LYOPHILIZED, FOR SOLUTION INTRAVENOUS at 07:16

## 2024-07-24 RX ADMIN — ANTACID TABLETS 500 MG: 500 TABLET, CHEWABLE ORAL at 00:22

## 2024-07-24 RX ADMIN — PIPERACILLIN AND TAZOBACTAM 3375 MG: 3; .375 INJECTION, POWDER, LYOPHILIZED, FOR SOLUTION INTRAVENOUS at 00:17

## 2024-07-24 RX ADMIN — HEPARIN SODIUM 5000 UNITS: 5000 INJECTION INTRAVENOUS; SUBCUTANEOUS at 06:30

## 2024-07-24 RX ADMIN — LEVOFLOXACIN 500 MG: 500 TABLET, FILM COATED ORAL at 14:12

## 2024-07-24 RX ADMIN — SERTRALINE HYDROCHLORIDE 25 MG: 25 TABLET ORAL at 08:52

## 2024-07-24 ASSESSMENT — ENCOUNTER SYMPTOMS
EYE PAIN: 0
PHOTOPHOBIA: 0
ABDOMINAL PAIN: 0
DIARRHEA: 0
SHORTNESS OF BREATH: 0
EYE REDNESS: 0
VOMITING: 0
EYE DISCHARGE: 0
TROUBLE SWALLOWING: 0
CONSTIPATION: 0
WHEEZING: 0
RHINORRHEA: 0
NAUSEA: 0

## 2024-07-24 NOTE — PLAN OF CARE
Problem: Discharge Planning  Goal: Discharge to home or other facility with appropriate resources  7/24/2024 0530 by Preethi Sanchez, RN  Outcome: Progressing  7/23/2024 1805 by Shilpa Muñoz RN  Outcome: Progressing     Problem: Pain  Goal: Verbalizes/displays adequate comfort level or baseline comfort level  7/24/2024 0530 by Preethi Sanchez RN  Outcome: Progressing  7/23/2024 1805 by Shilpa Muñoz, RN  Outcome: Progressing

## 2024-07-24 NOTE — PROGRESS NOTES
[facility]  Office: [phone]  Miguel Leyva DO, Abdon Lopez DO, Raheel Olivas DO, Lanre Brown DO, Laura Davis MD, Rachana Riojas MD, Randy Ricketts MD, Traci Segundo MD,  Max Nicolas MD, Bhargav Martinez MD, Jaswinder Maxwell MD,  Freya Milian DO, Velvet Barrera MD, Dexter Greenfield MD, Ishan Leyva DO, Ana Palmer MD,  Damon Mello DO, Danielle Bruce MD, Iliana Hennessy MD, Chelsey Muller MD, Issac Peterson MD,  Phu Hernandez MD, Riley Layne MD, Ceferino Huitron MD, Jorje Ha MD, Néstor Quintero MD, Tyshawn Silveira MD, Nikhil Malcolm DO, Gopal Freire DO, Mk Anthony MD,  Yg Randall MD, Shirley Waterhouse, CNP,  Juani Moore CNP, Danny Christianson, CNP,  Haily Bejarano, DNP, Alesha Terrazas, CNP, Niki Isidro, CNP, Leti Medrano, CNP, Dahlia Guevara, CNP, Shirley Resendez, PA-C, Sangeetha Joaquin PA-C, Joann Mark, CNP, Leisa Hollingsworth, CNP, Ninfa Mandujano, CNP, Genia Brady, CNP, Bhumika Mueller, CNP, Ashtyn Winters, CNS, Cordelia Crump, CNP, Martha Maxwell CNP, Tracy Schwab, CNP         [facility]   IN-PATIENT SERVICE   [facility]    Progress Note    7/24/2024    10:14 AM    Name:   Evangelina King  MRN:     [id number]     Acct:      [id number]   Room:   0316/0316-01   Day:  2  Admit Date:  7/22/2024  1:31 PM    PCP:   Barbie Ball MD  Code Status:  Full Code    Subjective:     C/C:   Chief Complaint   Patient presents with    Flank Pain     left    Nephrostomy Tube Problem      Interval History Status: improved.     Patient seen and examined at bedside this morning.  No acute events overnight.  Resting comfortably in bed.  Denies having any chest pain, shortness of breath, fever/chills, nausea or vomiting.  Tolerating oral diet.  Continues on IV Zosyn.  Awaiting final culture sensitivities for discharge antibiotics    Brief History:     44-year-old female with history of cervical cancer diagnosed in May 2024 s/p  radiation  nephrostomy tubes draining well currently  Follow-up with IR every 16 weeks for tube exchange    Cervical cancer status post radiation therapy  Awaiting chemotherapy--> was planned to start 7/23/24  Outpatient oncology follow-up     LESLIE secondary to UTI/obstructive uropathy/malfunctioning nephrostomy tube  Creatinine peaked to 2.9 on admission  Nephrology following   BMP pending, creatinine was 1.8 yesterday   Continued on IV hydration    UTI   Cultures from [place] grew K pneumonia and Enterococcus faecalis  ID following  Continue on IV Zosyn  Await final culture sensitivities for discharge antibiotic recommendations     History of iron deficiency anemia-status post IV iron  Following with heme oncology Dr. Vidal outpatient      Depression   Continue on zoloft     Dvt ppx- s/c heparin    Tyshawn Silveira MD  7/24/2024  10:14 AM

## 2024-07-24 NOTE — PROGRESS NOTES
CLINICAL PHARMACY NOTE: MEDS TO BEDS    Total # of Prescriptions Filled: 1   The following medications were delivered to the patient:  LEVAQUIN 500MG     Additional Documentation:     DIDN'T WANT NORCO

## 2024-07-24 NOTE — FLOWSHEET NOTE
07/24/24 1751   AVS Reviewed   AVS & discharge instructions reviewed with patient and/or representative? Yes   Reviewed instructions with Patient   Level of Understanding Questions answered;Verbalized understanding

## 2024-07-24 NOTE — PROGRESS NOTES
Zhou Covarrubias Santacroce, Khan, Mostafa, Buck, Murtagh Jr  Urology Progress Note     Subjective: Malfunctioning left nephrostomy tube  No acute events overnight  Afebrile, vital signs stable  Fever free for at least 24 hours  Feels tired, but doing well    Bilateral nephrostomy tubes draining well, left 950 cc / 24 hours, right 450 cc / 24 hours    Patient Vitals for the past 24 hrs:   BP Temp Temp src Pulse Resp SpO2   07/23/24 1917 126/68 98.8 °F (37.1 °C) Oral 100 16 98 %   07/23/24 1621 (!) 113/55 97.7 °F (36.5 °C) Oral 96 16 100 %   07/23/24 0807 119/70 98.3 °F (36.8 °C) Oral 100 18 100 %       Intake/Output Summary (Last 24 hours) at 7/24/2024 0715  Last data filed at 7/24/2024 0632  Gross per 24 hour   Intake --   Output 1400 ml   Net -1400 ml       Recent Labs     07/22/24  0955 07/22/24  1416 07/23/24  0713   WBC 8.1 6.2 4.2   HGB 9.4* 9.1* 8.6*   HCT 31.0* 30.7* 28.6*   MCV 74.3* 75.1* 76.5*    197 184     Recent Labs     07/22/24  1416 07/23/24  0713 07/23/24  1357   * 135* 136   K 4.5 4.2 4.2   CL 95* 102 102   CO2 22 21 21   BUN 23* 23* 21*   CREATININE 2.9* 2.1* 1.8*       No results for input(s): \"COLORU\", \"PHUR\", \"LABCAST\", \"WBCUA\", \"RBCUA\", \"MUCUS\", \"TRICHOMONAS\", \"YEAST\", \"BACTERIA\", \"CLARITYU\", \"SPECGRAV\", \"LEUKOCYTESUR\", \"UROBILINOGEN\", \"BILIRUBINUR\", \"BLOODU\" in the last 72 hours.    Invalid input(s): \"NITRATE\", \"GLUCOSEUKETONESUAMORPHOUS\"    Additional Lab/culture results:    Physical Exam:   General: NAD  HEENT: normocephalic, atraumatic  Cardiovascular: Acyanotic, pulses palpable  Respiratory: nonlabored breathing  Abdomen: soft, nontender, nondistended  : no SP tenderness of flank pain, bilateral nephrostomy tubes in place  Skin: warm and dry  Extremities: no peripheral edema  Psych: normal mood and menation    Interval Imaging Findings:    Impression:    44-year-old female with bilateral obstruction secondary to cervical cancer, with bilateral nephrostomy tubes in

## 2024-07-24 NOTE — DISCHARGE SUMMARY
[facility]  Office: [phone]  Miguel Leyva DO, Abdon Lopez DO, Raheel Olivas DO, Lanre Brown DO, Laura Davis MD, Rachana Riojas MD, Randy Ricketts MD, Traci Segundo MD,  Max Nicolas MD, Bhargav Martinez MD, Jaswinder Maxwell MD,  Freya Milian DO, Velvet Barrera MD, Dexter Greenfield MD, Ishan Leyva DO, Ana Palmer MD,  Damon Mello DO, Danielle Bruce MD, Iliana Hennessy MD, Chelsey Muller MD, Issac Peterson MD,  Phu Hernandez MD, Riley Layne MD, Ceferino Huitron MD, Jorej Ha MD, Néstor Quintero MD, Tyshawn Silveira MD, Nikhil Malcolm DO, Gopal Freire DO, Mk Anthony MD,  Yg Randall MD, Shirley Waterhouse, CNP,  Juani Moore CNP, Danny Christianson, CNP,  Haily Bejarano, DNP, Alesha Terrazas, CNP, Niki Isidro, CNP, Leti Medrano, CNP, Dahlia Guevara, CNP, Shirley Resendez, PA-C, Sangeetha Joaquin PA-C, Joann Mark, CNP, Leisa Hollingsworth, CNP, Ninfa Mandujano, CNP, Genia Brady, CNP, Bhumika Mueller, CNP, Ashtyn Winters, CNS, Cordelia Crump, CNP, Martha Maxwell CNP, Tracy Schwab, CNP         [facility]   IN-PATIENT SERVICE   [facility]    Discharge Summary     Patient ID: Evangelina King  :  1980   MRN: [id number]     ACCOUNT:  [id number]   Patient's PCP: Barbie Ball MD  Admit Date: 2024   Discharge Date: 2024     Length of Stay: 2  Code Status:  Full Code  Admitting Physician: Traci Segundo MD  Discharge Physician: Tyshawn Silveira MD     Active Discharge Diagnoses:     Hospital Problem Lists:  Principal Problem:    Malfunction of nephrostomy tube (HCC)  Active Problems:    Primary cervical squamous cell carcinoma (HCC)    Obstructive uropathy    Major depressive disorder, recurrent, unspecified    Iron deficiency    Pyelonephritis of left kidney  Resolved Problems:    * No resolved hospital problems. *      Admission Condition:  fair     Discharged Condition: good    Hospital Stay:     Hospital Course:  Evangelina

## 2024-07-24 NOTE — PLAN OF CARE
Problem: Discharge Planning  Goal: Discharge to home or other facility with appropriate resources  7/24/2024 1601 by Shilpa Muñoz, RN  Outcome: Completed  7/24/2024 0530 by Preethi Sanchez, RN  Outcome: Progressing     Problem: Pain  Goal: Verbalizes/displays adequate comfort level or baseline comfort level  7/24/2024 1601 by Shilpa Muñoz, RN  Outcome: Completed  7/24/2024 0530 by Preethi Sanchez, RN  Outcome: Progressing

## 2024-07-24 NOTE — PROGRESS NOTES
SUBJECTIVE    Patient was seen and examined.  Patient was sitting comfortably.  She was alert and awake x 3.  She remains hemodynamically stable.  Now her left PCN is draining well.  In the last 24 hours her urine output was 1850.  Intake was not properly recorded.  Creatinine is down to 1.5 mg/dL.  OBJECTIVE      CURRENT TEMPERATURE:  Temp: 98 °F (36.7 °C)  MAXIMUM TEMPERATURE OVER 24HRS:  Temp (24hrs), Av.2 °F (36.8 °C), Min:97.7 °F (36.5 °C), Max:98.8 °F (37.1 °C)    CURRENT RESPIRATORY RATE:  Respirations: 18  CURRENT PULSE:  Pulse: 95  CURRENT BLOOD PRESSURE:  BP: 109/61  24HR BLOOD PRESSURE RANGE:  Systolic (24hrs), Av , Min:109 , Max:126   ; Diastolic (24hrs), Av, Min:55, Max:68    24HR INTAKE/OUTPUT:    Intake/Output Summary (Last 24 hours) at 2024 1049  Last data filed at 2024 0632  Gross per 24 hour   Intake --   Output 1150 ml   Net -1150 ml     WEIGHT :Patient Vitals for the past 96 hrs (Last 3 readings):   Weight   24 0145 112 kg (246 lb 14.6 oz)   24 1329 112 kg (246 lb 14.6 oz)     PHYSICAL EXAM      GENERAL APPEARANCE: Awake and alert x 3  SKIN: Warm to touch and no erythema yes cellulitis  EYES: Pupils were reactive to light  ENT: no thrush no pharyngeal congestion   NECK: No JVD or carotid bruit.  PULMONARY: Bilateral air entry and clear  CADRDIOVASCULAR: S1 and S2 audible no S3 or murmur   ABDOMEN: Soft and nontender, bilateral PCN was present.  Both tubes were draining well and has a clear jodi-colored urine.  There was no swelling on left PCN site.   EXTREMITIES: No edema   CURRENT MEDICATIONS      calcium carbonate (TUMS) chewable tablet 500 mg, TID PRN  heparin (porcine) injection 5,000 Units, 3 times per day  piperacillin-tazobactam (ZOSYN) 3,375 mg in sodium chloride 0.9 % 50 mL IVPB (mini-bag), Q8H  sertraline (ZOLOFT) tablet 25 mg, Daily  0.9 % sodium chloride infusion, Continuous  sodium chloride flush 0.9 % injection 5-40 mL, 2 times per

## 2024-07-24 NOTE — PROGRESS NOTES
Psychiatric:         Mood and Affect: Mood normal.         Behavior: Behavior normal.         Past Medical History:     Past Medical History:   Diagnosis Date    Cancer (HCC)     14 years ago per patient    Obstructive uropathy     Uterine mass        Past Surgical  History:     Past Surgical History:   Procedure Laterality Date    CHOLECYSTECTOMY      CYSTOSCOPY N/A 05/20/2024    CYSTOSCOPY RETROGRADE PYELOGRAM, BLADDER BIOPSY performed by Ludin Barbosa MD at [facility] OR    CYSTOSCOPY W/ RETROGRADES  05/20/2024    CYSTOSCOPY RETROGRADE PYELOGRAM, BLADDER BIOPSY    DILATION AND CURETTAGE OF UTERUS  05/20/2024    DILATATION AND CURETTAGE HYSTEROSCOPY, VAGINAL / CERVIX. BIOPSY    DILATION AND CURETTAGE OF UTERUS N/A 05/20/2024    DILATATION AND CURETTAGE HYSTEROSCOPY, VAGINAL / CERVIX. BIOPSY performed by Jewell Clayton MD at [facility] OR    HYSTERECTOMY (CERVIX STATUS UNKNOWN)      IR NEPHROSTOMY PERCUTANEOUS LEFT  05/21/2024    IR NEPHROSTOMY PERCUTANEOUS LEFT 5/21/2024 Daniel Gonsalez MD [facility] SPECIAL PROCEDURES    IR NEPHROSTOMY PERCUTANEOUS RIGHT  05/21/2024    IR NEPHROSTOMY PERCUTANEOUS RIGHT 5/21/2024 Daniel Gonsalez MD [facility] SPECIAL PROCEDURES    IR PORT PLACEMENT EQUAL OR GREATER THAN 5 YEARS  6/19/2024    IR PORT PLACEMENT EQUAL OR GREATER THAN 5 YEARS 6/19/2024 Ishan Butterfield MD [facility] SPECIAL PROCEDURES    US LYMPH NODE BIOPSY  6/19/2024    US LYMPH NODE BIOPSY 6/19/2024 [facility] ULTRASOUND       Medications:      heparin (porcine)  5,000 Units SubCUTAneous 3 times per day    piperacillin-tazobactam  3,375 mg IntraVENous Q8H    sertraline  25 mg Oral Daily    sodium chloride flush  5-40 mL IntraVENous 2 times per day       Social History:     Social History     Socioeconomic History    Marital status:      Spouse name: Not on file    Number of children: Not on file    Years of education: Not on file    Highest education level: Not on file   Occupational History    Occupation: Bank     Tobacco Use    Smoking status: Former     Current packs/day: 0.00     Types: Cigarettes     Quit date: 2024     Years since quittin.1    Smokeless tobacco: Never   Substance and Sexual Activity    Alcohol use: Not Currently     Comment: Social    Drug use: Never    Sexual activity: Yes     Comment: Prior to diagnosis   Other Topics Concern    Not on file   Social History Narrative    Not on file     Social Determinants of Health     Financial Resource Strain: Not on file   Food Insecurity: No Food Insecurity (2024)    Hunger Vital Sign     Worried About Running Out of Food in the Last Year: Never true     Ran Out of Food in the Last Year: Never true   Transportation Needs: No Transportation Needs (2024)    PRAPARE - Transportation     Lack of Transportation (Medical): No     Lack of Transportation (Non-Medical): No   Physical Activity: Not on file   Stress: Not on file   Social Connections: Patient Declined (2024)    Social Connections ([facility] HRSN)     If for any reason you need help with day-to-day activities such as bathing, preparing meals, shopping, managing finances, etc., do you get the help you need?: Not on file   Intimate Partner Violence: Not on file   Housing Stability: Low Risk  (2024)    Housing Stability Vital Sign     Unable to Pay for Housing in the Last Year: No     Number of Places Lived in the Last Year: 1     Unstable Housing in the Last Year: No       Family History:     Family History   Adopted: Yes   Problem Relation Age of Onset    Lung Cancer Maternal Grandfather     Breast Cancer Other     Cancer Maternal Uncle       Medical Decision Making:   I have independently reviewed/ordered the following labs:    CBC with Differential:   Recent Labs     24  0713 24  0955   WBC 4.2 4.0   HGB 8.6* 9.0*   HCT 28.6* 28.4*    See Reflexed IPF Result   LYMPHOPCT 9* PENDING   MONOPCT 13* PENDING   EOSPCT 5* PENDING       BMP:  Recent Labs

## 2024-07-25 ENCOUNTER — CARE COORDINATION (OUTPATIENT)
Dept: CARE COORDINATION | Age: 44
End: 2024-07-25

## 2024-07-25 ENCOUNTER — TELEPHONE (OUTPATIENT)
Dept: ONCOLOGY | Age: 44
End: 2024-07-25

## 2024-07-25 LAB
EKG ATRIAL RATE: 100 BPM
EKG P AXIS: 41 DEGREES
EKG P-R INTERVAL: 120 MS
EKG Q-T INTERVAL: 342 MS
EKG QRS DURATION: 86 MS
EKG QTC CALCULATION (BAZETT): 441 MS
EKG R AXIS: 12 DEGREES
EKG T AXIS: 20 DEGREES
EKG VENTRICULAR RATE: 100 BPM

## 2024-07-25 NOTE — TELEPHONE ENCOUNTER
received referral from Nurse Navigator.  called patient who states she was discharged yesterday but may have to go back to hospital tonight because of a lump on her kidney. Patient frustrated with urology/nephrology care/communication especially with ordering of supplies.  offered emotional support.     Patient spoke with [employer] and they state her income from last 6 months is used in determining eligibility for assistance. Patient states she will be due to apply for [facility] Financial Assistance soon. Patient applying for Social Security Disability.

## 2024-07-25 NOTE — CARE COORDINATION
[facility] BI-PLANE RM 2 Radiology Special Procedures [phone]    9/18/2024 11:20 AM Enmanuel Crowell MD Urology [phone]    10/29/2024 2:15 PM Jeremy Vinson MD; SCHEDULE, [facility] NURSE Radiation Oncology [phone]            Care Transition Nurse provided contact information.  Plan for follow-up on next business day.  based on severity of symptoms and risk factors.  Plan for next call:  Get established with PCP, check on right nephrostomy tube output, is lump still present?      Courtney Ramos RN

## 2024-07-26 ENCOUNTER — TELEPHONE (OUTPATIENT)
Dept: ONCOLOGY | Age: 44
End: 2024-07-26

## 2024-07-26 ENCOUNTER — CARE COORDINATION (OUTPATIENT)
Dept: CARE COORDINATION | Age: 44
End: 2024-07-26

## 2024-07-26 NOTE — TELEPHONE ENCOUNTER
Name: Evangelina King  : 1980  MRN: [id number]    Oncology Navigation Follow-Up Note    Contact Type:  Telephone    Notes: Upon review of chart noted pt dc'd home .  Dr. Lomeli updated & inquired on rescheduling f/u along with C1 tx.  Dr. Lomeli stated will d/c cisplatin r/t kidney fx & place new order for carboplatin.  Dr. Lomeli requested pt scheduled for post hospital f/u  & if carboplatin approved tx to be scheduled after f/u.  Spoke w/Haily [facility], updated on conversation w/Dr. Lomeli.  Haily requested writer contact Lyric or Ana, [facility], to schedule  f/u.  Attempted to contact Lyric & Ana, both out of office.  Will route note.  Spoke w/pt updated on conversation w/Dr. Lomeli, notified will receive call to schedule  post hospital f/u & await PA approval for carboplatin prior to scheduling tx.  Pt verbalized understanding & denied questions/concerns.  Encouraged to contact writer prn.  Will continue to follow.      Electronically signed by Jessa Skaggs RN on 2024 at 11:01 AM

## 2024-07-26 NOTE — CARE COORDINATION
Care Transitions Note    Follow Up Call     Patient Current Location:  Home: [address] [place] [address]    Care Transition Nurse contacted the patient by telephone. Verified name and  as identifiers.    Additional needs identified to be addressed with provider   No needs identified                 Method of communication with provider: none.    Care Summary Note: Spoke with patient this am to check on right nephrostomy output. Yesterday she had issues with developing a lump to right flank, same as what she had to the left which she was just admitted and treated for. She had her right nephrostomy tube bag changed by urology nurse in office, was told to go to ED if no resolution by 5-6 pm.  Patient did not want to return all the way back to [place] from [place] just to have tube flushed.  She decided to wait till this am, when speaking with her, the lump had went down and she has output from right.  She denies pain to area and states just feels achy all over. I have tried to get home care orders so someone can show her how to manage her tubes, she was instructed to flush both daily, she has no solution or equipment to do this.  I have reached out to all specialists on care team. None of the specialists manage nephrostomy tubes, urology declined, nephrology will address at upcoming visit which is 3 weeks out.   TC to [place] [facility] to see if we can establish patient with a PCP emergently d/t the ongoing issues with the nephrostomy tubes, lack of supplies to manage them and no instruction on how to do this. I spoke to Lindsey in [facility] office, explained situation from May till now.  She discussed with , they are reaching out to patient today, will try to get her in with provider today or latest by Monday.  Spoke briefly to oncology navigator, patient coming to see oncology on Tuesday.    Plan of care updates since last contact:  Establish with PCP       Advance Care Planning:   Does

## 2024-07-27 LAB
MICROORGANISM SPEC CULT: NORMAL
MICROORGANISM SPEC CULT: NORMAL
SERVICE CMNT-IMP: NORMAL
SERVICE CMNT-IMP: NORMAL
SPECIMEN DESCRIPTION: NORMAL
SPECIMEN DESCRIPTION: NORMAL

## 2024-07-29 ENCOUNTER — CARE COORDINATION (OUTPATIENT)
Dept: CARE COORDINATION | Age: 44
End: 2024-07-29

## 2024-07-29 ENCOUNTER — TELEPHONE (OUTPATIENT)
Dept: INTERNAL MEDICINE | Age: 44
End: 2024-07-29

## 2024-07-29 ENCOUNTER — HOSPITAL ENCOUNTER (OUTPATIENT)
Dept: INFUSION THERAPY | Age: 44
Discharge: HOME | End: 2024-07-29
Payer: COMMERCIAL

## 2024-07-29 ENCOUNTER — OFFICE VISIT (OUTPATIENT)
Dept: INTERNAL MEDICINE | Age: 44
End: 2024-07-29
Payer: COMMERCIAL

## 2024-07-29 VITALS
HEART RATE: 69 BPM | RESPIRATION RATE: 16 BRPM | BODY MASS INDEX: 37.83 KG/M2 | DIASTOLIC BLOOD PRESSURE: 68 MMHG | SYSTOLIC BLOOD PRESSURE: 110 MMHG | OXYGEN SATURATION: 96 % | HEIGHT: 67 IN | WEIGHT: 241 LBS

## 2024-07-29 DIAGNOSIS — C53.8 MALIGNANT NEOPLASM OF OVERLAPPING SITES OF CERVIX (HCC): Primary | ICD-10-CM

## 2024-07-29 DIAGNOSIS — F32.A DEPRESSION, UNSPECIFIED DEPRESSION TYPE: ICD-10-CM

## 2024-07-29 DIAGNOSIS — C53.8 MALIGNANT NEOPLASM OF OVERLAPPING SITES OF CERVIX (HCC): ICD-10-CM

## 2024-07-29 DIAGNOSIS — G89.3 CANCER ASSOCIATED PAIN: ICD-10-CM

## 2024-07-29 DIAGNOSIS — Z93.6 NEPHROSTOMY STATUS (HCC): Primary | ICD-10-CM

## 2024-07-29 DIAGNOSIS — C53.0 MALIGNANT NEOPLASM OF ENDOCERVIX (HCC): ICD-10-CM

## 2024-07-29 DIAGNOSIS — C53.9 PRIMARY CERVICAL SQUAMOUS CELL CARCINOMA (HCC): Primary | ICD-10-CM

## 2024-07-29 DIAGNOSIS — C53.9 PRIMARY CERVICAL SQUAMOUS CELL CARCINOMA (HCC): ICD-10-CM

## 2024-07-29 LAB
ALBUMIN SERPL-MCNC: 3.4 G/DL (ref 3.5–5.2)
ALBUMIN/GLOB SERPL: 0.9 {RATIO} (ref 1–2.5)
ALP SERPL-CCNC: 134 U/L (ref 35–104)
ALT SERPL-CCNC: 9 U/L (ref 5–33)
ANION GAP SERPL CALCULATED.3IONS-SCNC: 12 MMOL/L (ref 9–17)
AST SERPL-CCNC: 19 U/L
BASOPHILS # BLD: 0.06 K/UL (ref 0–0.2)
BASOPHILS NFR BLD: 1 % (ref 0–2)
BILIRUB SERPL-MCNC: 0.2 MG/DL (ref 0.3–1.2)
BILIRUB UR QL STRIP: NEGATIVE
BUN SERPL-MCNC: 12 MG/DL (ref 6–20)
BUN/CREAT SERPL: 11 (ref 9–20)
CALCIUM SERPL-MCNC: 9.4 MG/DL (ref 8.6–10.4)
CHLORIDE SERPL-SCNC: 99 MMOL/L (ref 98–107)
CLARITY UR: CLEAR
CO2 SERPL-SCNC: 26 MMOL/L (ref 20–31)
COLOR UR: YELLOW
CREAT SERPL-MCNC: 1.1 MG/DL (ref 0.5–0.9)
EOSINOPHIL # BLD: 0.23 K/UL (ref 0–0.44)
EOSINOPHILS RELATIVE PERCENT: 4 % (ref 1–4)
ERYTHROCYTE [DISTWIDTH] IN BLOOD BY AUTOMATED COUNT: 21 % (ref 11.8–14.4)
GFR, ESTIMATED: 63 ML/MIN/1.73M2
GLUCOSE SERPL-MCNC: 135 MG/DL (ref 70–99)
GLUCOSE UR STRIP-MCNC: NEGATIVE MG/DL
HCT VFR BLD AUTO: 32.2 % (ref 36.3–47.1)
HGB BLD-MCNC: 9.9 G/DL (ref 11.9–15.1)
HGB UR QL STRIP.AUTO: ABNORMAL
IMM GRANULOCYTES # BLD AUTO: 0.06 K/UL (ref 0–0.3)
IMM GRANULOCYTES NFR BLD: 1 %
KETONES UR STRIP-MCNC: NEGATIVE MG/DL
LEUKOCYTE ESTERASE UR QL STRIP: ABNORMAL
LYMPHOCYTES NFR BLD: 0.87 K/UL (ref 1.1–3.7)
LYMPHOCYTES RELATIVE PERCENT: 15 % (ref 24–43)
MCH RBC QN AUTO: 22.7 PG (ref 25.2–33.5)
MCHC RBC AUTO-ENTMCNC: 30.7 G/DL (ref 25.2–33.5)
MCV RBC AUTO: 73.7 FL (ref 82.6–102.9)
MONOCYTES NFR BLD: 0.7 K/UL (ref 0.1–1.2)
MONOCYTES NFR BLD: 12 % (ref 3–12)
MORPHOLOGY: ABNORMAL
NEUTROPHILS NFR BLD: 67 % (ref 36–65)
NEUTS SEG NFR BLD: 3.88 K/UL (ref 1.5–8.1)
NITRITE UR QL STRIP: NEGATIVE
NRBC BLD-RTO: 0 PER 100 WBC
PH UR STRIP: 7 [PH] (ref 5–6)
PLATELET # BLD AUTO: 211 K/UL (ref 138–453)
PMV BLD AUTO: 8.2 FL (ref 8.1–13.5)
POTASSIUM SERPL-SCNC: 3.8 MMOL/L (ref 3.7–5.3)
PROT SERPL-MCNC: 7.3 G/DL (ref 6.4–8.3)
PROT UR STRIP-MCNC: ABNORMAL MG/DL
RBC # BLD AUTO: 4.37 M/UL (ref 3.95–5.11)
SODIUM SERPL-SCNC: 137 MMOL/L (ref 135–144)
SP GR UR STRIP: 1.01 (ref 1.01–1.02)
UROBILINOGEN UR STRIP-ACNC: NORMAL EU/DL (ref 0–1)
WBC OTHER # BLD: 5.8 K/UL (ref 3.5–11.3)

## 2024-07-29 PROCEDURE — 36591 DRAW BLOOD OFF VENOUS DEVICE: CPT

## 2024-07-29 PROCEDURE — 81003 URINALYSIS AUTO W/O SCOPE: CPT

## 2024-07-29 PROCEDURE — 85025 COMPLETE CBC W/AUTO DIFF WBC: CPT

## 2024-07-29 PROCEDURE — 2580000003 HC RX 258: Performed by: INTERNAL MEDICINE

## 2024-07-29 PROCEDURE — 6360000002 HC RX W HCPCS: Performed by: INTERNAL MEDICINE

## 2024-07-29 PROCEDURE — 80053 COMPREHEN METABOLIC PANEL: CPT

## 2024-07-29 PROCEDURE — 99204 OFFICE O/P NEW MOD 45 MIN: CPT | Performed by: INTERNAL MEDICINE

## 2024-07-29 RX ORDER — HEPARIN 100 UNIT/ML
500 SYRINGE INTRAVENOUS PRN
Status: DISCONTINUED | OUTPATIENT
Start: 2024-07-29 | End: 2024-07-30 | Stop reason: HOSPADM

## 2024-07-29 RX ORDER — SODIUM CHLORIDE 0.9 % (FLUSH) 0.9 %
5-40 SYRINGE (ML) INJECTION PRN
OUTPATIENT
Start: 2024-07-29

## 2024-07-29 RX ORDER — METRONIDAZOLE 500 MG/1
500 TABLET ORAL 3 TIMES DAILY
Qty: 30 TABLET | Refills: 0 | Status: CANCELLED | OUTPATIENT
Start: 2024-07-29 | End: 2024-08-08

## 2024-07-29 RX ORDER — SODIUM CHLORIDE 0.9 % (FLUSH) 0.9 %
5-40 SYRINGE (ML) INJECTION PRN
Status: DISCONTINUED | OUTPATIENT
Start: 2024-07-29 | End: 2024-07-30 | Stop reason: HOSPADM

## 2024-07-29 RX ORDER — ACETAMINOPHEN AND CODEINE PHOSPHATE 300; 30 MG/1; MG/1
1 TABLET ORAL EVERY 8 HOURS PRN
Qty: 42 TABLET | Refills: 0 | Status: CANCELLED | OUTPATIENT
Start: 2024-07-29 | End: 2024-08-12

## 2024-07-29 RX ORDER — SODIUM CHLORIDE 9 MG/ML
25 INJECTION, SOLUTION INTRAVENOUS PRN
OUTPATIENT
Start: 2024-07-29

## 2024-07-29 RX ORDER — HEPARIN 100 UNIT/ML
500 SYRINGE INTRAVENOUS PRN
OUTPATIENT
Start: 2024-07-29

## 2024-07-29 RX ORDER — CYCLOBENZAPRINE HCL 10 MG
10 TABLET ORAL 3 TIMES DAILY PRN
Qty: 90 TABLET | Refills: 1 | Status: CANCELLED | OUTPATIENT
Start: 2024-07-29 | End: 2024-10-27

## 2024-07-29 RX ADMIN — SODIUM CHLORIDE, PRESERVATIVE FREE 10 ML: 5 INJECTION INTRAVENOUS at 13:01

## 2024-07-29 RX ADMIN — HEPARIN 500 UNITS: 100 SYRINGE at 13:01

## 2024-07-29 SDOH — ECONOMIC STABILITY: HOUSING INSECURITY
IN THE LAST 12 MONTHS, WAS THERE A TIME WHEN YOU DID NOT HAVE A STEADY PLACE TO SLEEP OR SLEPT IN A SHELTER (INCLUDING NOW)?: NO

## 2024-07-29 SDOH — ECONOMIC STABILITY: INCOME INSECURITY: HOW HARD IS IT FOR YOU TO PAY FOR THE VERY BASICS LIKE FOOD, HOUSING, MEDICAL CARE, AND HEATING?: NOT VERY HARD

## 2024-07-29 SDOH — ECONOMIC STABILITY: FOOD INSECURITY: WITHIN THE PAST 12 MONTHS, THE FOOD YOU BOUGHT JUST DIDN'T LAST AND YOU DIDN'T HAVE MONEY TO GET MORE.: NEVER TRUE

## 2024-07-29 SDOH — ECONOMIC STABILITY: FOOD INSECURITY: WITHIN THE PAST 12 MONTHS, YOU WORRIED THAT YOUR FOOD WOULD RUN OUT BEFORE YOU GOT MONEY TO BUY MORE.: NEVER TRUE

## 2024-07-29 ASSESSMENT — PATIENT HEALTH QUESTIONNAIRE - PHQ9
DEPRESSION UNABLE TO ASSESS: FUNCTIONAL CAPACITY MOTIVATION LIMITS ACCURACY
4. FEELING TIRED OR HAVING LITTLE ENERGY: MORE THAN HALF THE DAYS
3. TROUBLE FALLING OR STAYING ASLEEP: MORE THAN HALF THE DAYS
9. THOUGHTS THAT YOU WOULD BE BETTER OFF DEAD, OR OF HURTING YOURSELF: NOT AT ALL
5. POOR APPETITE OR OVEREATING: MORE THAN HALF THE DAYS
SUM OF ALL RESPONSES TO PHQ QUESTIONS 1-9: 8
SUM OF ALL RESPONSES TO PHQ9 QUESTIONS 1 & 2: 2
10. IF YOU CHECKED OFF ANY PROBLEMS, HOW DIFFICULT HAVE THESE PROBLEMS MADE IT FOR YOU TO DO YOUR WORK, TAKE CARE OF THINGS AT HOME, OR GET ALONG WITH OTHER PEOPLE: NOT DIFFICULT AT ALL
7. TROUBLE CONCENTRATING ON THINGS, SUCH AS READING THE NEWSPAPER OR WATCHING TELEVISION: NOT AT ALL
8. MOVING OR SPEAKING SO SLOWLY THAT OTHER PEOPLE COULD HAVE NOTICED. OR THE OPPOSITE, BEING SO FIGETY OR RESTLESS THAT YOU HAVE BEEN MOVING AROUND A LOT MORE THAN USUAL: NOT AT ALL
SUM OF ALL RESPONSES TO PHQ QUESTIONS 1-9: 8
1. LITTLE INTEREST OR PLEASURE IN DOING THINGS: SEVERAL DAYS
SUM OF ALL RESPONSES TO PHQ QUESTIONS 1-9: 8
6. FEELING BAD ABOUT YOURSELF - OR THAT YOU ARE A FAILURE OR HAVE LET YOURSELF OR YOUR FAMILY DOWN: NOT AT ALL
SUM OF ALL RESPONSES TO PHQ QUESTIONS 1-9: 8
2. FEELING DOWN, DEPRESSED OR HOPELESS: SEVERAL DAYS

## 2024-07-29 NOTE — PROGRESS NOTES
Patient arrived for Lab Draw Only. Port accessed without complication for lab draw Patient tolerated well.  Patient left infusion center with all belongings and steady gate.  New appt for 1st infusion set for Thursday.

## 2024-07-29 NOTE — CARE COORDINATION
Care Transitions Note    Follow Up Call     Patient Current Location:  Home: [address] [place] [address]    Care Transition Nurse contacted the patient by telephone. Verified name and  as identifiers.    Additional needs identified to be addressed with provider   No needs identified                 Method of communication with provider: none.    Care Summary Note: Patient called this am d/t concerns over her chemo starting tomorrow.  She states she spoke with her oncology nurse, Lyric.  She said that she was sure her chemo was going to start tomorrow on .  Chemo had to be readjusted d/t renal function and this was submitted to her insurance this am.  She is worried there will be further delay in starting therapy.  She has a new patient appointment to establish with PCP today, will get order for home care and have management of her nephrostomy tubes. Patient also got a call from urology office who moved her appointment up to Wednesday, schedule notes say that patient needs home care. Last visit with urology, she states he asked why she was there for follow up as he does not manage nephrostomy tubes, he only ordered the exchanges.  She will establish with new PCP today, advised to ask while there if she needs to keep urology appointment.  She was advised to call her oncology navigator after her appointment to see if insurance approved chemo.  Once home care order obtained, will verify with them they received referral and start of care is initiated.     Plan of care updates since last contact:  None       Advance Care Planning:   Does patient have an Advance Directive: reviewed during previous call, see note. .    Medication Review:  Full medication reconciliation completed during previous call.    Remote Patient Monitoring:  Offered patient enrollment in the Remote Patient Monitoring (RPM) program for in-home monitoring: Patient is not eligible for RPM program because: patient does not have qualifying

## 2024-07-29 NOTE — TELEPHONE ENCOUNTER
Writer received a call from [facility] stating that they are out of network with patients insurance and they will not be able to service her.  Please advise.

## 2024-07-29 NOTE — PROGRESS NOTES
[facility] INTERNAL MEDICINE    Visit Date:  7/29/2024  Patient:  Evangelina King  YOB: 1980    Assessment & Plan     Constipation: I advised that she can use MiraLAX as well as Metamucil, which she says she has at home.  Reassess next visit.    Depression: Will increase Zoloft to 50 mg.  Reassess next visit.    Cervical squamous cell carcinoma.  Status postradiation.  Chemotherapy to be initiated.  Follows with oncology.  Will defer management.    Nephrostomy tube status: Due to ureteral obstruction secondary to cervical cancer.  Follows with urology and IR.  Will order home health for nephrostomy tube care.    Cancer related pain: Can continue Tylenol with codeine.  Reassess next visit.     Diagnosis Orders   1. Nephrostomy status (HCC)  External Referral To Home Health      2. Malignant neoplasm of endocervix (HCC)  External Referral To Home Health      3. Cancer associated pain  External Referral To Home Health      4. Primary cervical squamous cell carcinoma (HCC)        5. Depression, unspecified depression type            Chief Complaint  New Patient (Need home health referral and needs help with her  management of her nephrostomy tubes)    History of Present Illness   Presents to establish care.  She was recently admitted to the hospital for nephrostomy tube dysfunction as well as UTI.  It seems that her nephrostomy bag was plugged, it was flushed, and then drainage resumed.  She follows with urology as well as IR.  She has ureteral obstruction due to cervical cancer.  She follows with oncology as well.  At this time, she denies dysuria, hematuria, fever, chills.    She says that she has cramps in her abdomen for which she uses Tylenol with codeine.  Also has constipation and asks whether she can use MiraLAX and advised that she can try.  Denies hematochezia, diarrhea.    Objective  /68 (Site: Left Upper Arm, Position: Sitting, Cuff Size: Large Adult)   Pulse 69   Resp 16

## 2024-07-30 ENCOUNTER — TELEMEDICINE (OUTPATIENT)
Dept: ONCOLOGY | Age: 44
End: 2024-07-30
Payer: COMMERCIAL

## 2024-07-30 ENCOUNTER — TELEPHONE (OUTPATIENT)
Dept: INTERNAL MEDICINE | Age: 44
End: 2024-07-30

## 2024-07-30 DIAGNOSIS — K59.00 CONSTIPATION, UNSPECIFIED CONSTIPATION TYPE: ICD-10-CM

## 2024-07-30 DIAGNOSIS — C53.9 PRIMARY CERVICAL SQUAMOUS CELL CARCINOMA (HCC): Primary | ICD-10-CM

## 2024-07-30 PROCEDURE — 99214 OFFICE O/P EST MOD 30 MIN: CPT | Performed by: INTERNAL MEDICINE

## 2024-07-30 RX ORDER — MEPERIDINE HYDROCHLORIDE 50 MG/ML
12.5 INJECTION INTRAMUSCULAR; INTRAVENOUS; SUBCUTANEOUS PRN
Status: CANCELLED | OUTPATIENT
Start: 2024-08-01

## 2024-07-30 RX ORDER — ALBUTEROL SULFATE 90 UG/1
4 AEROSOL, METERED RESPIRATORY (INHALATION) PRN
Status: CANCELLED | OUTPATIENT
Start: 2024-08-01

## 2024-07-30 RX ORDER — PROCHLORPERAZINE EDISYLATE 5 MG/ML
5 INJECTION INTRAMUSCULAR; INTRAVENOUS
Status: CANCELLED | OUTPATIENT
Start: 2024-08-01

## 2024-07-30 RX ORDER — DIPHENHYDRAMINE HYDROCHLORIDE 50 MG/ML
50 INJECTION INTRAMUSCULAR; INTRAVENOUS ONCE
Status: CANCELLED | OUTPATIENT
Start: 2024-08-01 | End: 2024-07-30

## 2024-07-30 RX ORDER — SODIUM CHLORIDE 0.9 % (FLUSH) 0.9 %
5-40 SYRINGE (ML) INJECTION PRN
Status: CANCELLED | OUTPATIENT
Start: 2024-08-01

## 2024-07-30 RX ORDER — FAMOTIDINE 10 MG/ML
20 INJECTION, SOLUTION INTRAVENOUS
Status: CANCELLED | OUTPATIENT
Start: 2024-08-01

## 2024-07-30 RX ORDER — SODIUM CHLORIDE 9 MG/ML
5-250 INJECTION, SOLUTION INTRAVENOUS PRN
Status: CANCELLED | OUTPATIENT
Start: 2024-08-01

## 2024-07-30 RX ORDER — FAMOTIDINE 10 MG/ML
20 INJECTION, SOLUTION INTRAVENOUS ONCE
Status: CANCELLED | OUTPATIENT
Start: 2024-08-01 | End: 2024-07-30

## 2024-07-30 RX ORDER — ACETAMINOPHEN 325 MG/1
650 TABLET ORAL
Status: CANCELLED | OUTPATIENT
Start: 2024-08-01

## 2024-07-30 RX ORDER — SODIUM CHLORIDE 9 MG/ML
INJECTION, SOLUTION INTRAVENOUS CONTINUOUS
Status: CANCELLED | OUTPATIENT
Start: 2024-08-01

## 2024-07-30 RX ORDER — SENNA AND DOCUSATE SODIUM 50; 8.6 MG/1; MG/1
2 TABLET, FILM COATED ORAL DAILY
Qty: 60 TABLET | Refills: 0 | Status: SHIPPED | OUTPATIENT
Start: 2024-07-30 | End: 2024-08-29

## 2024-07-30 RX ORDER — PALONOSETRON 0.05 MG/ML
0.25 INJECTION, SOLUTION INTRAVENOUS ONCE
Status: CANCELLED | OUTPATIENT
Start: 2024-08-01 | End: 2024-07-30

## 2024-07-30 RX ORDER — DIPHENHYDRAMINE HYDROCHLORIDE 50 MG/ML
50 INJECTION INTRAMUSCULAR; INTRAVENOUS
Status: CANCELLED | OUTPATIENT
Start: 2024-08-01

## 2024-07-30 RX ORDER — ONDANSETRON 2 MG/ML
8 INJECTION INTRAMUSCULAR; INTRAVENOUS
Status: CANCELLED | OUTPATIENT
Start: 2024-08-01

## 2024-07-30 RX ORDER — HEPARIN SODIUM (PORCINE) LOCK FLUSH IV SOLN 100 UNIT/ML 100 UNIT/ML
500 SOLUTION INTRAVENOUS PRN
Status: CANCELLED | OUTPATIENT
Start: 2024-08-01

## 2024-07-30 RX ORDER — EPINEPHRINE 1 MG/ML
0.3 INJECTION, SOLUTION, CONCENTRATE INTRAVENOUS PRN
Status: CANCELLED | OUTPATIENT
Start: 2024-08-01

## 2024-07-30 NOTE — TELEPHONE ENCOUNTER
----- Message from Courtney Ramos RN sent at 7/29/2024  5:36 PM EDT -----  This patient had [facility] that came out after her first admission back in May, they did 2 days of ATB and discharged. They never knew she had nephrostomy tubes as there were no orders at discharge.  So, [facility] took her insurance last month, hoping they are in network.

## 2024-07-30 NOTE — TELEPHONE ENCOUNTER
Patient did not want [facility] for home health . [facility] is taking over her home health for her.

## 2024-07-31 ENCOUNTER — PATIENT MESSAGE (OUTPATIENT)
Dept: UROLOGY | Age: 44
End: 2024-07-31

## 2024-07-31 ENCOUNTER — CARE COORDINATION (OUTPATIENT)
Dept: CARE COORDINATION | Age: 44
End: 2024-07-31

## 2024-07-31 ENCOUNTER — TELEPHONE (OUTPATIENT)
Dept: INTERNAL MEDICINE | Age: 44
End: 2024-07-31

## 2024-07-31 ENCOUNTER — TELEPHONE (OUTPATIENT)
Dept: PALLATIVE CARE | Age: 44
End: 2024-07-31

## 2024-07-31 DIAGNOSIS — R59.1 LYMPHADENOPATHY: Primary | ICD-10-CM

## 2024-07-31 DIAGNOSIS — N13.39 OTHER HYDRONEPHROSIS: ICD-10-CM

## 2024-07-31 DIAGNOSIS — N13.9 OBSTRUCTIVE UROPATHY: ICD-10-CM

## 2024-07-31 DIAGNOSIS — R10.2 PELVIC PAIN: ICD-10-CM

## 2024-07-31 NOTE — TELEPHONE ENCOUNTER
From: Evangelina King  To: Dr. Enmanuel Crowell  Sent: 7/31/2024 12:40 PM EDT  Subject: Evangelina King     I was asked to send you a photo of my right tube drainage.

## 2024-07-31 NOTE — CARE COORDINATION
Care Transitions Note    Follow Up Call     Patient Current Location:  Home: [address] [place] [address]    Care Transition Nurse contacted the patient by telephone. Verified name and  as identifiers.    Additional needs identified to be addressed with provider   No needs identified                 Method of communication with provider: none.    Care Summary Note: Spoke with patient for transitional follow up call. She is having issues with constipation this week.  She states she hasn't moved her bowels in 3 days, feels very uncomfortable and states that she can feel a hard lump near her rectum that won't come out.  She is also has started to develop hemorrhoids and is quite painful.  She has been prescribed Senna and MOM which she has taken, she has taken Miralax as well for the last couple of days with no results.  She has done a fleets enema as well but did not result in any solid stool.  We discussed various ways to help promote bm, advised to sit in hot bath or use sitz bath for relief of her hemorrhoids.  She plans to have spouse help manually extract this evening, was given instruction by home care nurse today with advise of using lubrication well and doing over commode.  I did express that she may want to consider taking her muscle relaxer and pain medication before attempting.   Patient was established with new PCP on Monday, home care was ordered and now has [facility] to manage her nephrostomy tubes. This has been a great relief for patient that she has help managing her nephrostomy tubes.  She will start her first chemo tomorrow, creatinine has come down to 1.1.  Her older daughter from [place] is in town and will go with her tomorrow for her first session.  Discussed with Evangelina after the constipation is resolved, will see how she responds to chemo and what this will do to her bowels. Explained that for some, SE of diarrhea occurs and may be the opposite issue for her after treatments

## 2024-07-31 NOTE — TELEPHONE ENCOUNTER
Nata with [facility] letting you know she admitted Evangelina to Home Health today  [phone] if any questions

## 2024-07-31 NOTE — DISCHARGE INSTR - COC
Continuity of Care Form    Patient Name: Evangelina Marquis   :  1980  MRN:  [id number]    Admit date:  2024  Discharge date:  ***    Code Status Order: Prior   Advance Directives:     Admitting Physician:  Traci Segundo MD  PCP: Miranda Sofia MD    Discharging Nurse: ***  Discharging Hospital Unit/Room#: 0316/0316-01  Discharging Unit Phone Number: ***    Emergency Contact:   Extended Emergency Contact Information  Primary Emergency Contact: cary marquis  Home Phone: [phone]  Relation: Spouse  Secondary Emergency Contact: Orin Bowers  Home Phone: [phone]  Mobile Phone: [phone]  Relation: Friend    Past Surgical History:  Past Surgical History:   Procedure Laterality Date    CHOLECYSTECTOMY      CYSTOSCOPY N/A 2024    CYSTOSCOPY RETROGRADE PYELOGRAM, BLADDER BIOPSY performed by Ludin Barbosa MD at [facility] OR    CYSTOSCOPY W/ RETROGRADES  2024    CYSTOSCOPY RETROGRADE PYELOGRAM, BLADDER BIOPSY    DILATION AND CURETTAGE OF UTERUS  2024    DILATATION AND CURETTAGE HYSTEROSCOPY, VAGINAL / CERVIX. BIOPSY    DILATION AND CURETTAGE OF UTERUS N/A 2024    DILATATION AND CURETTAGE HYSTEROSCOPY, VAGINAL / CERVIX. BIOPSY performed by Jewell Clayton MD at [facility] OR    HYSTERECTOMY (CERVIX STATUS UNKNOWN)      IR NEPHROSTOMY PERCUTANEOUS LEFT  2024    IR NEPHROSTOMY PERCUTANEOUS LEFT 2024 Daniel Gonsalez MD [facility] SPECIAL PROCEDURES    IR NEPHROSTOMY PERCUTANEOUS RIGHT  2024    IR NEPHROSTOMY PERCUTANEOUS RIGHT 2024 Daniel Gonsalez MD [facility] SPECIAL PROCEDURES    IR PORT PLACEMENT EQUAL OR GREATER THAN 5 YEARS  2024    IR PORT PLACEMENT EQUAL OR GREATER THAN 5 YEARS 2024 Ishan Butterfield MD [facility] SPECIAL PROCEDURES    US LYMPH NODE BIOPSY  2024    US LYMPH NODE BIOPSY 2024 [facility] ULTRASOUND       Immunization History:     There is no immunization history on file for this patient.    Active Problems:  Patient Active Problem List

## 2024-07-31 NOTE — TELEPHONE ENCOUNTER
Called Evangelina.  Introduced myself and my role in Palliative Care Clinic.  Let Evangelina know that I am calling to see if she would like to schedule her appointment with [facility]. Explained that patient's doctor referred her to palliative care clinic at [facility] office with Ms. Courtney Contreras N.P..  I explained what palliaitve care services are.  We discussed goals of care conversations, symptom management, side effect management, support emotionally and spiritualy as well.   Evangelina is from [place] area.  I let her know that there are local palliative care agencies that would come to her for visits.  I let her know that we can refer her to one local if that is more convenient.  Gave Evangelina multiple options for pc in [place].  Evangelina is requesting referral to [facility].  I let her know that I will place referral and they will be reaching out to her within a few days.    Encouraged Evangelina to call me at [phone] ([place]'s palliative care office if I can assist her in anyway.     New referral obtained as patient requested.  Faxed order, demographics and follow up notes to [facility]  Called [place] with update on new referral.     [facility] Palliative Care Coordinator  Susu GIL, RN, ONN-CG  [facility] [phone]/ [facility] [place] [place] [phone]/ [facility] [phone]

## 2024-08-01 ENCOUNTER — HOSPITAL ENCOUNTER (OUTPATIENT)
Dept: INFUSION THERAPY | Age: 44
Discharge: HOME | End: 2024-08-01
Payer: COMMERCIAL

## 2024-08-01 VITALS
TEMPERATURE: 98.1 F | DIASTOLIC BLOOD PRESSURE: 80 MMHG | RESPIRATION RATE: 16 BRPM | HEART RATE: 109 BPM | WEIGHT: 238 LBS | SYSTOLIC BLOOD PRESSURE: 132 MMHG | HEIGHT: 67 IN | BODY MASS INDEX: 37.35 KG/M2 | OXYGEN SATURATION: 97 %

## 2024-08-01 DIAGNOSIS — C53.9 PRIMARY CERVICAL SQUAMOUS CELL CARCINOMA (HCC): Primary | ICD-10-CM

## 2024-08-01 DIAGNOSIS — C53.8 MALIGNANT NEOPLASM OF OVERLAPPING SITES OF CERVIX (HCC): ICD-10-CM

## 2024-08-01 LAB
ALBUMIN SERPL-MCNC: 3.3 G/DL (ref 3.5–5.2)
ALBUMIN/GLOB SERPL: 0.9 {RATIO} (ref 1–2.5)
ALP SERPL-CCNC: 145 U/L (ref 35–104)
ALT SERPL-CCNC: 8 U/L (ref 5–33)
ANION GAP SERPL CALCULATED.3IONS-SCNC: 11 MMOL/L (ref 9–17)
AST SERPL-CCNC: 17 U/L
BASOPHILS # BLD: <0.03 K/UL (ref 0–0.2)
BASOPHILS NFR BLD: 0 % (ref 0–2)
BILIRUB SERPL-MCNC: 0.2 MG/DL (ref 0.3–1.2)
BILIRUB UR QL STRIP: ABNORMAL
BUN SERPL-MCNC: 10 MG/DL (ref 6–20)
BUN/CREAT SERPL: 9 (ref 9–20)
CALCIUM SERPL-MCNC: 9 MG/DL (ref 8.6–10.4)
CHLORIDE SERPL-SCNC: 100 MMOL/L (ref 98–107)
CLARITY UR: ABNORMAL
CO2 SERPL-SCNC: 28 MMOL/L (ref 20–31)
COLOR UR: YELLOW
CREAT SERPL-MCNC: 1.1 MG/DL (ref 0.5–0.9)
EOSINOPHIL # BLD: 0.3 K/UL (ref 0–0.44)
EOSINOPHILS RELATIVE PERCENT: 5 % (ref 1–4)
ERYTHROCYTE [DISTWIDTH] IN BLOOD BY AUTOMATED COUNT: 22 % (ref 11.8–14.4)
GFR, ESTIMATED: 63 ML/MIN/1.73M2
GLUCOSE SERPL-MCNC: 108 MG/DL (ref 70–99)
GLUCOSE UR STRIP-MCNC: NEGATIVE MG/DL
HCG UR QL: NEGATIVE
HCT VFR BLD AUTO: 32.2 % (ref 36.3–47.1)
HGB BLD-MCNC: 9.8 G/DL (ref 11.9–15.1)
HGB UR QL STRIP.AUTO: ABNORMAL
IMM GRANULOCYTES # BLD AUTO: 0.05 K/UL (ref 0–0.3)
IMM GRANULOCYTES NFR BLD: 1 %
KETONES UR STRIP-MCNC: NEGATIVE MG/DL
LEUKOCYTE ESTERASE UR QL STRIP: ABNORMAL
LYMPHOCYTES NFR BLD: 0.87 K/UL (ref 1.1–3.7)
LYMPHOCYTES RELATIVE PERCENT: 16 % (ref 24–43)
MCH RBC QN AUTO: 22.8 PG (ref 25.2–33.5)
MCHC RBC AUTO-ENTMCNC: 30.4 G/DL (ref 25.2–33.5)
MCV RBC AUTO: 75.1 FL (ref 82.6–102.9)
MONOCYTES NFR BLD: 0.72 K/UL (ref 0.1–1.2)
MONOCYTES NFR BLD: 13 % (ref 3–12)
NEUTROPHILS NFR BLD: 65 % (ref 36–65)
NEUTS SEG NFR BLD: 3.67 K/UL (ref 1.5–8.1)
NITRITE UR QL STRIP: NEGATIVE
NRBC BLD-RTO: 0 PER 100 WBC
PH UR STRIP: 6.5 [PH] (ref 5–6)
PLATELET # BLD AUTO: 212 K/UL (ref 138–453)
PMV BLD AUTO: 8.1 FL (ref 8.1–13.5)
POTASSIUM SERPL-SCNC: 4 MMOL/L (ref 3.7–5.3)
PROT SERPL-MCNC: 7.1 G/DL (ref 6.4–8.3)
PROT UR STRIP-MCNC: ABNORMAL MG/DL
RBC # BLD AUTO: 4.29 M/UL (ref 3.95–5.11)
SODIUM SERPL-SCNC: 139 MMOL/L (ref 135–144)
SP GR UR STRIP: 1.02 (ref 1.01–1.02)
UROBILINOGEN UR STRIP-ACNC: NORMAL EU/DL (ref 0–1)
WBC OTHER # BLD: 5.6 K/UL (ref 3.5–11.3)

## 2024-08-01 PROCEDURE — 96375 TX/PRO/DX INJ NEW DRUG ADDON: CPT

## 2024-08-01 PROCEDURE — 2500000003 HC RX 250 WO HCPCS: Performed by: INTERNAL MEDICINE

## 2024-08-01 PROCEDURE — 81003 URINALYSIS AUTO W/O SCOPE: CPT

## 2024-08-01 PROCEDURE — 96417 CHEMO IV INFUS EACH ADDL SEQ: CPT

## 2024-08-01 PROCEDURE — 6360000002 HC RX W HCPCS: Performed by: INTERNAL MEDICINE

## 2024-08-01 PROCEDURE — 80053 COMPREHEN METABOLIC PANEL: CPT

## 2024-08-01 PROCEDURE — 96413 CHEMO IV INFUSION 1 HR: CPT

## 2024-08-01 PROCEDURE — 2580000003 HC RX 258: Performed by: INTERNAL MEDICINE

## 2024-08-01 PROCEDURE — 81025 URINE PREGNANCY TEST: CPT

## 2024-08-01 PROCEDURE — 85025 COMPLETE CBC W/AUTO DIFF WBC: CPT

## 2024-08-01 PROCEDURE — 96367 TX/PROPH/DG ADDL SEQ IV INF: CPT

## 2024-08-01 PROCEDURE — 96415 CHEMO IV INFUSION ADDL HR: CPT

## 2024-08-01 PROCEDURE — 36591 DRAW BLOOD OFF VENOUS DEVICE: CPT

## 2024-08-01 RX ORDER — SODIUM CHLORIDE 0.9 % (FLUSH) 0.9 %
5-40 SYRINGE (ML) INJECTION PRN
Status: DISCONTINUED | OUTPATIENT
Start: 2024-08-01 | End: 2024-08-02 | Stop reason: HOSPADM

## 2024-08-01 RX ORDER — DEXAMETHASONE SODIUM PHOSPHATE 10 MG/ML
10 INJECTION, SOLUTION INTRAMUSCULAR; INTRAVENOUS ONCE
Status: COMPLETED | OUTPATIENT
Start: 2024-08-01 | End: 2024-08-01

## 2024-08-01 RX ORDER — SODIUM CHLORIDE 9 MG/ML
5-250 INJECTION, SOLUTION INTRAVENOUS PRN
Status: DISCONTINUED | OUTPATIENT
Start: 2024-08-01 | End: 2024-08-02 | Stop reason: HOSPADM

## 2024-08-01 RX ORDER — DIPHENHYDRAMINE HYDROCHLORIDE 50 MG/ML
50 INJECTION INTRAMUSCULAR; INTRAVENOUS ONCE
Status: COMPLETED | OUTPATIENT
Start: 2024-08-01 | End: 2024-08-01

## 2024-08-01 RX ORDER — PALONOSETRON 0.05 MG/ML
0.25 INJECTION, SOLUTION INTRAVENOUS ONCE
Status: COMPLETED | OUTPATIENT
Start: 2024-08-01 | End: 2024-08-01

## 2024-08-01 RX ORDER — HEPARIN 100 UNIT/ML
500 SYRINGE INTRAVENOUS PRN
Status: DISCONTINUED | OUTPATIENT
Start: 2024-08-01 | End: 2024-08-02 | Stop reason: HOSPADM

## 2024-08-01 RX ADMIN — SODIUM CHLORIDE 150 MG: 9 INJECTION, SOLUTION INTRAVENOUS at 09:00

## 2024-08-01 RX ADMIN — SODIUM CHLORIDE, PRESERVATIVE FREE 10 ML: 5 INJECTION INTRAVENOUS at 13:31

## 2024-08-01 RX ADMIN — CARBOPLATIN 535 MG: 10 INJECTION, SOLUTION INTRAVENOUS at 12:52

## 2024-08-01 RX ADMIN — SODIUM CHLORIDE 30 ML/HR: 9 INJECTION, SOLUTION INTRAVENOUS at 09:00

## 2024-08-01 RX ADMIN — SODIUM CHLORIDE, PRESERVATIVE FREE 20 MG: 5 INJECTION INTRAVENOUS at 08:48

## 2024-08-01 RX ADMIN — DIPHENHYDRAMINE HYDROCHLORIDE 50 MG: 50 INJECTION INTRAMUSCULAR; INTRAVENOUS at 08:48

## 2024-08-01 RX ADMIN — HEPARIN 500 UNITS: 100 SYRINGE at 13:31

## 2024-08-01 RX ADMIN — PACLITAXEL 402 MG: 6 INJECTION, SOLUTION INTRAVENOUS at 09:34

## 2024-08-01 RX ADMIN — PALONOSETRON 0.25 MG: 0.05 INJECTION, SOLUTION INTRAVENOUS at 08:48

## 2024-08-01 RX ADMIN — DEXAMETHASONE SODIUM PHOSPHATE 10 MG: 10 INJECTION, SOLUTION INTRAMUSCULAR; INTRAVENOUS at 08:48

## 2024-08-01 NOTE — PROGRESS NOTES
Patient arrived for [place], and Taxel  infusion.  VSS as charted.  Port accessed without complication.  Lab draw off of port access.  Patient tolerated infusion well.  Patient left infusion center with all belongings and steady gate.  Lunch provided.  New appt set for 3 weeks. Spoke to Dr. Lomeli who states every 3 week cycle.

## 2024-08-01 NOTE — PROGRESS NOTES
Evangelina King                                                                                                                  7/30/2024  MRN:   [id number]  YOB: 1980  PCP:                           Miranda Sofia MD  Referring Physician: No ref. 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  She presented to an [facility] for evaluation of abnormal uterine bleeding for 2 years with associated foul-smelling, black vaginal discharge for 6 weeks and associated urinary frequency.  She was also noted to have fevers at this time.  Imaging at [facility] showed a thickened endometrium and hydronephrosis.  She was transferred to [facility] for GYN evaluation for concern for malignancy with thickened endometrium and hydronephrosis.     She had an EUA, vaginoscopy, endometrial/cervical biopsies, cystoscopy, bladder biopsy on 5/20/2024  IR has been consulted for bilateral PERC neph tubes  case discussed with GYN-ONC , they suspect endometrial versus cervical cancer. Not a surgical candidate   Pt just back from [facility], she is in a lot of pain, hard to get much information     [facility] History:  Patient seen in clinic via virtual visit.  Patient could not come into the clinic physically and requested the visit to be

## 2024-08-05 ENCOUNTER — TELEPHONE (OUTPATIENT)
Dept: GYNECOLOGIC ONCOLOGY | Age: 44
End: 2024-08-05

## 2024-08-05 NOTE — TELEPHONE ENCOUNTER
Called patient to schedule post radiation appointment.  Writer explained reason for appointments.  Scheduled follow up with patient.  Patient voiced understanding and appreciation.

## 2024-08-06 ENCOUNTER — CARE COORDINATION (OUTPATIENT)
Dept: CARE COORDINATION | Age: 44
End: 2024-08-06

## 2024-08-06 NOTE — CARE COORDINATION
Care Transitions Note    Follow Up Call     Patient Current Location:  Home: [address] [place] [address]    Care Transition Nurse contacted the patient by telephone. Verified name and  as identifiers.    Additional needs identified to be addressed with provider   No needs identified                 Method of communication with provider: none.    Care Summary Note: Spoke with Evangelina today for transitional follow up.  Last call, patient was severely constipated, was in a great deal of pain trying to have a bowel movement.  I had spoke to her shortly after she was seen by her home care nurse who had advised to to keep taking the stool softener.  When we spoke, she could feel the stool near her rectum but could not get out.  She had tried several oral medications to help promote bm but advised that the stool may need to be removed manually or with enemas/suppositories.  Expressed once the hard stool is removed that she will more than likely have loose stool behind that.  She was finally able to go later that evening and did have quite a bit of loose stool afterwards that she was unable to control.   This resolved and she was able to go for chemo the following day with no issues of incontinence.  She had called today to let me know she was able to urinate on her own the other night. She states she felt the urge and when she sat did have small stream of urine she was able to pass.  She has not urinated since her originally admission, was excited that she was getting a little bit of function back.  No issues currently with the nephrostomy tubes but is getting some skin breakdown with the tape.  [facility] nurse is helping trouble shoot the dressings to protect her skin.  Kidney function is now better, creatinine down to 1.1.  She states nephrology office called and told her they were cancelling her nephrostomy appointment, they advised they don't manage nephrostomy tubes.  She said they were going to have her see

## 2024-08-08 ENCOUNTER — TELEPHONE (OUTPATIENT)
Dept: INTERNAL MEDICINE | Age: 44
End: 2024-08-08

## 2024-08-08 NOTE — TELEPHONE ENCOUNTER
Isreal from [facility] brought over fax from [place] for pt - cert period noted to be 5/24-7/22, we did not see patient until 7/29/24. Attempting to determine who is to receive this paperwork - called [place] in [place]. No answer, left a VM with info for a call back.

## 2024-08-12 ENCOUNTER — CARE COORDINATION (OUTPATIENT)
Dept: CARE COORDINATION | Age: 44
End: 2024-08-12

## 2024-08-12 ENCOUNTER — TELEPHONE (OUTPATIENT)
Dept: PALLATIVE CARE | Age: 44
End: 2024-08-12

## 2024-08-12 NOTE — CARE COORDINATION
Care Transitions Note    Follow Up Call     Patient Current Location:  Home: [address] [place] [address]    Care Transition Nurse contacted the patient by telephone. Verified name and  as identifiers.    Additional needs identified to be addressed with provider   No needs identified                 Method of communication with provider: none.    Care Summary Note: Incoming call from patient today.  She went to see her daughter last week out of town and now has returned.  She was calling to give update, had issues with bowels a little when away but came home and had decent bowel movement, no constipation like she had the previous week.  She states that d/t kidney issues she was having with the blockage of the nephrostomy tube, her OB/GYN did not want her to be on the Cisplatin until nephrostomy tubes were out.  She is scheduled for chemo for .  She was to have pelvic exam on Wednesday but may be cancelling, family friend had passed away and  is on Wednesday.  She has been feeling ok, has some fading neuropathy pain that has lessened since her original first chemo, this has been the only bothersome side effect from her chemo so far, has some hair thinning but not total loss of hair.  She had called because she wanted to let me know that her home care nurse had called to IR where she gets her nephrostomy tube exchanges and explained about her painful last exchange and is now going to get sedation when she has this done.  This has been worrisome for her as she did not want to go through that experience again.  She has been eating and drinking ok, will urinate on her own here and there, states she had episode of incontinence during the night when out of town but no more since.  She has no appointments this week other than OB/GYN which she plans on rescheduling.  She did speak with palliative care nurse that was from her area but does not feel the need at this time.  She feels she is in a good place

## 2024-08-12 NOTE — TELEPHONE ENCOUNTER
Called and left message for Chelsea at [facility], to confirm receipt of referral for palliative care services.      Spoke with Chelsea and she relates talking to patient and patient has their contact information.   Chelsea relates she did not want palliative care services at this time.     Thanked Chelsea.     [facility]  Susu ARTN, RN, ONN-CG  [facility] [phone]/ [facility] [phone]/ [facility] [phone]

## 2024-08-15 DIAGNOSIS — T14.8XXA NERVE COMPRESSION: ICD-10-CM

## 2024-08-15 RX ORDER — GABAPENTIN 300 MG/1
300 CAPSULE ORAL
Qty: 90 CAPSULE | Refills: 1 | Status: SHIPPED | OUTPATIENT
Start: 2024-08-15 | End: 2025-02-11

## 2024-08-16 ENCOUNTER — CARE COORDINATION (OUTPATIENT)
Dept: CARE COORDINATION | Age: 44
End: 2024-08-16

## 2024-08-16 NOTE — CARE COORDINATION
Care Transitions Note    Final Call     Patient Current Location:  Home: [address] [place] [address]    Care Transition Nurse contacted the patient by telephone. Verified name and  as identifiers.  /  Patient graduated from the Care Transitions program on .  Patient/family verbalizes confidence in the ability to self-manage at this time..      Advance Care Planning:   Does patient have an Advance Directive: reviewed during previous call, see note. .    Handoff:   Patient/family agreeable to [facility] outreach.      Care Summary Note: Spoke with patient today for transitional follow up.  She is doing well this week, has not had any new issues. She tells me today she has been urinating which she is very excited about.  Has not been able to urinate on her own since her original admission and then started to urinate last week.  She is not having any new issues with nephrostomy tubes, both are draining well.  Her home care nurse with [facility] arranged for her next nephrostomy tube exchange and she will be getting sedation for next exchange.  She had quite a bit of pain with last one that lingered for some time.  She is anxious about the exchanges d/t the past 2 experiences, both times were quite painful for her.  She is eating and drinking, bowels are moving, has not had any recurrence of severe constipation like she did last week.  We had discussed bowel regimen but were playing by ear since her chemo will be starting next week and we discussed various SE of chemo including diarrhea.  She has had one treatment of the Taxol so far, states she has had some hair thinning and some neuropathy SE but the neuropathy pain she was having has improved.  Discussed I was referring to [facility] for added support, she would like to have [facility], currently has an oncology navigator as well.  She denies any new needs or concerns at this time.  Will hand off to Nesha for continued care coordination.     Assessments:  Care Transitions

## 2024-08-20 ENCOUNTER — TELEMEDICINE (OUTPATIENT)
Dept: ONCOLOGY | Age: 44
End: 2024-08-20
Payer: COMMERCIAL

## 2024-08-20 DIAGNOSIS — C53.9 PRIMARY CERVICAL SQUAMOUS CELL CARCINOMA (HCC): Primary | ICD-10-CM

## 2024-08-20 DIAGNOSIS — E61.1 IRON DEFICIENCY: ICD-10-CM

## 2024-08-20 DIAGNOSIS — Z51.11 CHEMOTHERAPY MANAGEMENT, ENCOUNTER FOR: ICD-10-CM

## 2024-08-20 PROCEDURE — 99214 OFFICE O/P EST MOD 30 MIN: CPT | Performed by: INTERNAL MEDICINE

## 2024-08-20 PROCEDURE — 99213 OFFICE O/P EST LOW 20 MIN: CPT | Performed by: INTERNAL MEDICINE

## 2024-08-20 NOTE — PROGRESS NOTES
pyelography. A decompressed collecting system was noted with appropriate position of the existing nephrostomy tubes. A wire was advanced and nephrostomy exchange was performed using a similar sized tube. Appropriate fluoroscopic spot images were acquired throughout the procedure.     1. Successful right and left  antegrade pyelography demonstrating decompressed collecting systems 2. 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  Toxicity check performed.  Overall patient tolerating treatment with manageable side effects.  Patient did have neuropathic pain with cycle 1 likely due to Taxol.  Pain now better with gabapentin.  Patient is currently on 300 mg nightly.  If pain worsens will increase frequency  Scheduled for cycle 2 later this week.  Discussed over phone with patient's gynecological oncologist.  Continue carboplatin and Taxol.  Cisplatin was switched to carboplatin due to acute kidney injury  Will follow-up on gynecological evaluation and clearance before bevacizumab was added  Keytruda not approved by insurance due to PD-L1 negative even though patient has mismatch repair deficient disease  Patient is completed pelvic radiation pain better controlled  Will assess response after 3 cycles of chemotherapy.  Will recheck iron stores  Continue to monitor closely for side effects  NCCN guidelines were reviewed and discussed with the patient.  The diagnosis and care plan were discussed with the patient in detail. I discussed the natural history of the disease, prognosis, risks and goals of therapy

## 2024-08-22 ENCOUNTER — HOSPITAL ENCOUNTER (OUTPATIENT)
Dept: INFUSION THERAPY | Age: 44
Discharge: HOME | End: 2024-08-22
Payer: COMMERCIAL

## 2024-08-22 VITALS
RESPIRATION RATE: 14 BRPM | BODY MASS INDEX: 36.76 KG/M2 | HEIGHT: 67 IN | TEMPERATURE: 97.2 F | OXYGEN SATURATION: 99 % | DIASTOLIC BLOOD PRESSURE: 79 MMHG | WEIGHT: 234.2 LBS | SYSTOLIC BLOOD PRESSURE: 136 MMHG | HEART RATE: 105 BPM

## 2024-08-22 DIAGNOSIS — C53.9 PRIMARY CERVICAL SQUAMOUS CELL CARCINOMA (HCC): Primary | ICD-10-CM

## 2024-08-22 DIAGNOSIS — E61.1 IRON DEFICIENCY: ICD-10-CM

## 2024-08-22 DIAGNOSIS — C53.8 MALIGNANT NEOPLASM OF OVERLAPPING SITES OF CERVIX (HCC): ICD-10-CM

## 2024-08-22 DIAGNOSIS — Z51.11 CHEMOTHERAPY MANAGEMENT, ENCOUNTER FOR: ICD-10-CM

## 2024-08-22 LAB
ALBUMIN SERPL-MCNC: 3.6 G/DL (ref 3.5–5.2)
ALBUMIN/GLOB SERPL: 0.9 {RATIO} (ref 1–2.5)
ALP SERPL-CCNC: 159 U/L (ref 35–104)
ALT SERPL-CCNC: 7 U/L (ref 5–33)
ANION GAP SERPL CALCULATED.3IONS-SCNC: 13 MMOL/L (ref 9–17)
AST SERPL-CCNC: 14 U/L
BASOPHILS # BLD: 0.04 K/UL (ref 0–0.2)
BASOPHILS NFR BLD: 1 % (ref 0–2)
BILIRUB SERPL-MCNC: 0.3 MG/DL (ref 0.3–1.2)
BILIRUB UR QL STRIP: NEGATIVE
BUN SERPL-MCNC: 19 MG/DL (ref 6–20)
BUN/CREAT SERPL: 19 (ref 9–20)
CALCIUM SERPL-MCNC: 9.4 MG/DL (ref 8.6–10.4)
CHLORIDE SERPL-SCNC: 103 MMOL/L (ref 98–107)
CLARITY UR: CLEAR
CO2 SERPL-SCNC: 25 MMOL/L (ref 20–31)
COLOR UR: YELLOW
CREAT SERPL-MCNC: 1 MG/DL (ref 0.5–0.9)
EOSINOPHIL # BLD: 0.08 K/UL (ref 0–0.44)
EOSINOPHILS RELATIVE PERCENT: 1 % (ref 1–4)
ERYTHROCYTE [DISTWIDTH] IN BLOOD BY AUTOMATED COUNT: 22.3 % (ref 11.8–14.4)
FERRITIN SERPL-MCNC: 359 NG/ML (ref 13–150)
GFR, ESTIMATED: 71 ML/MIN/1.73M2
GLUCOSE SERPL-MCNC: 108 MG/DL (ref 70–99)
GLUCOSE UR STRIP-MCNC: NEGATIVE MG/DL
HCG UR QL: NEGATIVE
HCT VFR BLD AUTO: 31.1 % (ref 36.3–47.1)
HGB BLD-MCNC: 9.9 G/DL (ref 11.9–15.1)
HGB UR QL STRIP.AUTO: ABNORMAL
IMM GRANULOCYTES # BLD AUTO: 0.03 K/UL (ref 0–0.3)
IMM GRANULOCYTES NFR BLD: 1 %
IRON SATN MFR SERPL: 33 % (ref 20–55)
IRON SERPL-MCNC: 82 UG/DL (ref 37–145)
KETONES UR STRIP-MCNC: NEGATIVE MG/DL
LEUKOCYTE ESTERASE UR QL STRIP: ABNORMAL
LYMPHOCYTES NFR BLD: 1.33 K/UL (ref 1.1–3.7)
LYMPHOCYTES RELATIVE PERCENT: 21 % (ref 24–43)
MCH RBC QN AUTO: 24.8 PG (ref 25.2–33.5)
MCHC RBC AUTO-ENTMCNC: 31.8 G/DL (ref 25.2–33.5)
MCV RBC AUTO: 77.8 FL (ref 82.6–102.9)
MONOCYTES NFR BLD: 0.47 K/UL (ref 0.1–1.2)
MONOCYTES NFR BLD: 8 % (ref 3–12)
NEUTROPHILS NFR BLD: 69 % (ref 36–65)
NEUTS SEG NFR BLD: 4.32 K/UL (ref 1.5–8.1)
NITRITE UR QL STRIP: NEGATIVE
NRBC BLD-RTO: 0 PER 100 WBC
PH UR STRIP: 7.5 [PH] (ref 5–6)
PLATELET # BLD AUTO: 186 K/UL (ref 138–453)
PMV BLD AUTO: 8.4 FL (ref 8.1–13.5)
POTASSIUM SERPL-SCNC: 4.6 MMOL/L (ref 3.7–5.3)
PROT SERPL-MCNC: 7.4 G/DL (ref 6.4–8.3)
PROT UR STRIP-MCNC: ABNORMAL MG/DL
RBC # BLD AUTO: 4 M/UL (ref 3.95–5.11)
SODIUM SERPL-SCNC: 141 MMOL/L (ref 135–144)
SP GR UR STRIP: 1.02 (ref 1.01–1.02)
TIBC SERPL-MCNC: 245 UG/DL (ref 250–450)
UNSATURATED IRON BINDING CAPACITY: 163 UG/DL (ref 112–347)
UROBILINOGEN UR STRIP-ACNC: NORMAL EU/DL (ref 0–1)
WBC OTHER # BLD: 6.3 K/UL (ref 3.5–11.3)

## 2024-08-22 PROCEDURE — 83540 ASSAY OF IRON: CPT

## 2024-08-22 PROCEDURE — 96367 TX/PROPH/DG ADDL SEQ IV INF: CPT

## 2024-08-22 PROCEDURE — 36591 DRAW BLOOD OFF VENOUS DEVICE: CPT

## 2024-08-22 PROCEDURE — 6360000002 HC RX W HCPCS: Performed by: INTERNAL MEDICINE

## 2024-08-22 PROCEDURE — 2580000003 HC RX 258: Performed by: INTERNAL MEDICINE

## 2024-08-22 PROCEDURE — 81025 URINE PREGNANCY TEST: CPT

## 2024-08-22 PROCEDURE — 81003 URINALYSIS AUTO W/O SCOPE: CPT

## 2024-08-22 PROCEDURE — 96375 TX/PRO/DX INJ NEW DRUG ADDON: CPT

## 2024-08-22 PROCEDURE — 96417 CHEMO IV INFUS EACH ADDL SEQ: CPT

## 2024-08-22 PROCEDURE — 83550 IRON BINDING TEST: CPT

## 2024-08-22 PROCEDURE — 85025 COMPLETE CBC W/AUTO DIFF WBC: CPT

## 2024-08-22 PROCEDURE — 96413 CHEMO IV INFUSION 1 HR: CPT

## 2024-08-22 PROCEDURE — 2500000003 HC RX 250 WO HCPCS: Performed by: INTERNAL MEDICINE

## 2024-08-22 PROCEDURE — 96415 CHEMO IV INFUSION ADDL HR: CPT

## 2024-08-22 PROCEDURE — 80053 COMPREHEN METABOLIC PANEL: CPT

## 2024-08-22 PROCEDURE — 82728 ASSAY OF FERRITIN: CPT

## 2024-08-22 RX ORDER — ONDANSETRON 2 MG/ML
8 INJECTION INTRAMUSCULAR; INTRAVENOUS
Status: CANCELLED | OUTPATIENT
Start: 2024-08-22

## 2024-08-22 RX ORDER — SODIUM CHLORIDE 0.9 % (FLUSH) 0.9 %
5-40 SYRINGE (ML) INJECTION PRN
Status: DISCONTINUED | OUTPATIENT
Start: 2024-08-22 | End: 2024-08-23 | Stop reason: HOSPADM

## 2024-08-22 RX ORDER — SODIUM CHLORIDE 0.9 % (FLUSH) 0.9 %
5-40 SYRINGE (ML) INJECTION PRN
Status: CANCELLED | OUTPATIENT
Start: 2024-08-22

## 2024-08-22 RX ORDER — EPINEPHRINE 1 MG/ML
0.3 INJECTION, SOLUTION, CONCENTRATE INTRAVENOUS PRN
Status: CANCELLED | OUTPATIENT
Start: 2024-08-22

## 2024-08-22 RX ORDER — ACETAMINOPHEN 325 MG/1
650 TABLET ORAL
Status: CANCELLED | OUTPATIENT
Start: 2024-08-22

## 2024-08-22 RX ORDER — FAMOTIDINE 10 MG/ML
20 INJECTION, SOLUTION INTRAVENOUS
Status: CANCELLED | OUTPATIENT
Start: 2024-08-22

## 2024-08-22 RX ORDER — SODIUM CHLORIDE 9 MG/ML
5-250 INJECTION, SOLUTION INTRAVENOUS PRN
Status: CANCELLED | OUTPATIENT
Start: 2024-08-22

## 2024-08-22 RX ORDER — PALONOSETRON 0.05 MG/ML
0.25 INJECTION, SOLUTION INTRAVENOUS ONCE
Status: CANCELLED | OUTPATIENT
Start: 2024-08-22 | End: 2026-12-04

## 2024-08-22 RX ORDER — FAMOTIDINE 10 MG/ML
20 INJECTION, SOLUTION INTRAVENOUS ONCE
Status: CANCELLED | OUTPATIENT
Start: 2024-08-22 | End: 2026-12-04

## 2024-08-22 RX ORDER — ALBUTEROL SULFATE 90 UG/1
4 AEROSOL, METERED RESPIRATORY (INHALATION) PRN
Status: CANCELLED | OUTPATIENT
Start: 2024-08-22

## 2024-08-22 RX ORDER — DIPHENHYDRAMINE HYDROCHLORIDE 50 MG/ML
50 INJECTION INTRAMUSCULAR; INTRAVENOUS ONCE
Status: CANCELLED | OUTPATIENT
Start: 2024-08-22 | End: 2026-12-04

## 2024-08-22 RX ORDER — PROCHLORPERAZINE EDISYLATE 5 MG/ML
5 INJECTION INTRAMUSCULAR; INTRAVENOUS
Status: CANCELLED | OUTPATIENT
Start: 2024-08-22

## 2024-08-22 RX ORDER — DEXAMETHASONE SODIUM PHOSPHATE 10 MG/ML
10 INJECTION, SOLUTION INTRAMUSCULAR; INTRAVENOUS ONCE
Status: COMPLETED | OUTPATIENT
Start: 2024-08-22 | End: 2024-08-22

## 2024-08-22 RX ORDER — SODIUM CHLORIDE 9 MG/ML
5-250 INJECTION, SOLUTION INTRAVENOUS PRN
Status: DISCONTINUED | OUTPATIENT
Start: 2024-08-22 | End: 2024-08-23 | Stop reason: HOSPADM

## 2024-08-22 RX ORDER — HEPARIN SODIUM (PORCINE) LOCK FLUSH IV SOLN 100 UNIT/ML 100 UNIT/ML
500 SOLUTION INTRAVENOUS PRN
Status: CANCELLED | OUTPATIENT
Start: 2024-08-22

## 2024-08-22 RX ORDER — SODIUM CHLORIDE 9 MG/ML
INJECTION, SOLUTION INTRAVENOUS CONTINUOUS
Status: CANCELLED | OUTPATIENT
Start: 2024-08-22

## 2024-08-22 RX ORDER — HEPARIN 100 UNIT/ML
500 SYRINGE INTRAVENOUS PRN
Status: DISCONTINUED | OUTPATIENT
Start: 2024-08-22 | End: 2024-08-23 | Stop reason: HOSPADM

## 2024-08-22 RX ORDER — PALONOSETRON 0.05 MG/ML
0.25 INJECTION, SOLUTION INTRAVENOUS ONCE
Status: COMPLETED | OUTPATIENT
Start: 2024-08-22 | End: 2024-08-22

## 2024-08-22 RX ORDER — DIPHENHYDRAMINE HYDROCHLORIDE 50 MG/ML
50 INJECTION INTRAMUSCULAR; INTRAVENOUS ONCE
Status: COMPLETED | OUTPATIENT
Start: 2024-08-22 | End: 2024-08-22

## 2024-08-22 RX ORDER — DIPHENHYDRAMINE HYDROCHLORIDE 50 MG/ML
50 INJECTION INTRAMUSCULAR; INTRAVENOUS
Status: CANCELLED | OUTPATIENT
Start: 2024-08-22

## 2024-08-22 RX ORDER — MEPERIDINE HYDROCHLORIDE 50 MG/ML
12.5 INJECTION INTRAMUSCULAR; INTRAVENOUS; SUBCUTANEOUS PRN
Status: CANCELLED | OUTPATIENT
Start: 2024-08-22

## 2024-08-22 RX ADMIN — FAMOTIDINE 20 MG: 10 INJECTION, SOLUTION INTRAVENOUS at 09:49

## 2024-08-22 RX ADMIN — HEPARIN 500 UNITS: 100 SYRINGE at 14:19

## 2024-08-22 RX ADMIN — SODIUM CHLORIDE 150 MG: 9 INJECTION, SOLUTION INTRAVENOUS at 10:01

## 2024-08-22 RX ADMIN — DEXAMETHASONE SODIUM PHOSPHATE 10 MG: 10 INJECTION, SOLUTION INTRAMUSCULAR; INTRAVENOUS at 09:49

## 2024-08-22 RX ADMIN — PACLITAXEL 400 MG: 6 INJECTION, SOLUTION INTRAVENOUS at 10:29

## 2024-08-22 RX ADMIN — PALONOSETRON 0.25 MG: 0.05 INJECTION, SOLUTION INTRAVENOUS at 09:49

## 2024-08-22 RX ADMIN — SODIUM CHLORIDE, PRESERVATIVE FREE 10 ML: 5 INJECTION INTRAVENOUS at 14:19

## 2024-08-22 RX ADMIN — DIPHENHYDRAMINE HYDROCHLORIDE 50 MG: 50 INJECTION INTRAMUSCULAR; INTRAVENOUS at 09:49

## 2024-08-22 RX ADMIN — CARBOPLATIN 570 MG: 10 INJECTION, SOLUTION INTRAVENOUS at 13:37

## 2024-08-22 RX ADMIN — SODIUM CHLORIDE 20 ML/HR: 9 INJECTION, SOLUTION INTRAVENOUS at 08:02

## 2024-08-22 NOTE — FLOWSHEET NOTE
MA left a message for the patient to return my call.   Spiritual Health Assessment/Progress Note  [facility]    Initial Encounter, Spiritual/Emotional Needs,  ,  ,      Name: Evangelina King MRN: [id number]    Age: 44 y.o.     Sex: female   Language: English   Mosque: None   <principal problem not specified>     Date: 8/22/2024            Total Time Calculated: 2 min              Spiritual Assessment began in [facility]  MED ONC        Referral/Consult From: Rounding   Encounter Overview/Reason: Initial Encounter, Spiritual/Emotional Needs  Service Provided For: Patient and family together    Patient was accompanied by her mom (Lyric) and \"second mom\" (Vi), expressed her johanne in God and support of her moms, and politely declined public prayer.    Johanne, Belief, Meaning:   Patient identifies as spiritual  Family/Friends identify as spiritual      Importance and Influence:  Patient has spiritual/personal beliefs that influence decisions regarding their health  Family/Friends have spiritual/personal beliefs that influence decisions regarding the patient's health    Community:  Patient feels well-supported. Support system includes: Extended family and Other: Mom.  Family/Friends are connected with a spiritual community:    Assessment and Plan of Care:     Patient Interventions include: Facilitated expression of thoughts and feelings  Family/Friends Interventions include: Affirmed coping skills/support systems    Patient Plan of Care: Spiritual Care available upon further referral  Family/Friends Plan of Care: Spiritual Care available upon further referral    Electronically signed by Chaplain Hugo on 8/22/2024 at 3:32 PM

## 2024-08-23 ENCOUNTER — CARE COORDINATION (OUTPATIENT)
Dept: CARE COORDINATION | Age: 44
End: 2024-08-23

## 2024-08-23 NOTE — CARE COORDINATION
Ambulatory Care Coordination Note     8/23/2024 11:09 AM     Patient Current Location:  Home: [address] [place] [address] [place] [address]     This patient was received as a referral from Care Transition Nurse.    ACM contacted the patient by telephone.  Provided introduction to self, and explanation of the ACM role.   Patient accepted care management services at this time.          ACM: Katerine Vogel, RN     Care Summary Note:     Evangelina was referred for ACM from CTN.     She has: Cervical squamous cell carcinoma, anna nephrostomy tubes in place- on medications follows with radiation oncology, oncology,  urology and PCP    [facility]  care    Plan of Care : Enrolled in ACM    Continue assessments, education and support    Review medications    Tube exchange 8/29/2024      Spoke with Evangelina. She has had 2 chemo treatments so far. Chemo Q 3 weeks- will need 17 sessions depending on testing etc.   She has [facility]  care.   She continues with nephrostomy tubes- Dr. Crowell orders exchanges.   She has an oncology navigator.   Gabapentin does help with nerve pain after chemo treatment.   She stated she is passing small amounts of urine since first chemo treatment.   She has completed 2 weeks of radiation.      PCP/Specialist follow up:   Future Appointments         Provider Specialty Dept Phone    8/29/2024 1:00 PM Radiologist, [facility] Interventional; [facility] INTERVENT RN POOL; ST[facility] BI-PLANE RM 2 Radiology Special Procedures [phone]    9/3/2024 11:00 AM Madison Valerio PA-C Gynecologic Oncology [phone]    9/10/2024 2:20 PM Miranda Sofia MD Internal Medicine [phone]    9/10/2024 3:00 PM Tera Lomeli MD Oncology [phone]    9/18/2024 11:20 AM Enmanuel Crowell MD Urology [phone]    10/29/2024 2:15 PM Jeremy Vinson MD; SCHEDULE, [facility] RAD ONC NURSE Radiation Oncology [phone]            Follow Up:   Plan for next AC outreach in approximately 1 week to complete:  - CC Protocol assessments  - SDOH

## 2024-08-27 ENCOUNTER — TELEPHONE (OUTPATIENT)
Dept: ONCOLOGY | Age: 44
End: 2024-08-27

## 2024-08-27 NOTE — TELEPHONE ENCOUNTER
Patient authorized/already had tx at [facility]. [facility] received authorization for tx so contacted [facility] to see what further plan for patient was. Contacted patient to check if she'd like to continue tx at [facility], patient was confused why she was authorized at [facility] and hung up. Closing [facility] authorization.

## 2024-08-28 ENCOUNTER — CARE COORDINATION (OUTPATIENT)
Dept: CARE COORDINATION | Age: 44
End: 2024-08-28

## 2024-08-28 NOTE — CARE COORDINATION
Ambulatory Care Coordination Note     8/28/2024 2:58 PM     Patient Current Location:  Home: [address] [place], [place] [address] [place] [address]     ACM contacted the patient by telephone.       ACM: Katerine Vogel, ANCA     Care Summary Note:     She has:          Cervical squamous cell carcinoma, anna nephrostomy tubes in place- on medications follows with radiation oncology, oncology,  urology and PCP     [facility]  care     Plan of Care : Continue assessments, education and support     RPM- N/A                          Review medications                          Tube exchange 8/29/2024        Offered patient enrollment in the Remote Patient Monitoring (RPM) program for in-home monitoring: Patient is not eligible for RPM program because: patient does not have qualifying diagnosis.    Spoke with Evangelina. She stated she is voiding more. She is scheduled for nephrostomy tubes exchange.   Her mom and dad are in home today.   Gabapentin does help with pain and she does not need it nightly. Tylenol when pain not as bad.   She is working with staff at [facility] for financial assistance since she is no longer working.   She denied any concerns today. Has apts scheduled for F/U. Reviewed.        Assessments Completed:   General Assessment    Do you have any symptoms that are causing concern?: No          Care Planning:       Goals Addressed                      This Visit's Progress       Patient Stated      Patient Stated (pt-stated)   On track      She will keep scheduled appointments, reach out if with concerns.     Barriers: lack of support and lack of education  Plan for overcoming my barriers: [facility]  care and Care Coordination Intervention  Confidence: 10/10  Anticipated Goal Completion Date: 11/28/2024               PCP/Specialist follow up:   Future Appointments         Provider Specialty Dept Phone    8/29/2024 1:00 PM Radiologist, St[facility] Interventional; [facility] INTERVENT RN POOL; ST[facility] BI-PLANE RM 2 Radiology Special Procedures  [phone]    9/10/2024 11:00 AM Madison Valerio PA-C Gynecologic Oncology [phone]    9/10/2024 2:20 PM Miranda Sofia MD Internal Medicine [phone]    9/10/2024 3:00 PM Tera Lomeli MD Oncology [phone]    9/12/2024  8:00 AM [facility] MED ONC CHAIR 06 Infusion Therapy [phone]    9/18/2024 11:20 AM Enmanuel Crowell MD Urology [phone]    10/24/2024 1:00 PM Radiologist, St[facility] Interventional; STV INTERVENT RN POOL; STV BI-PLANE RM 2 Radiology Special Procedures [phone]    10/29/2024 2:15 PM Jeremy Vinson MD; SCHEDULE, [facility] RAD ONC NURSE Radiation Oncology [phone]            Follow Up:   Plan for next ACM outreach in approximately 1 week to complete:  - CC Protocol assessments  - SDOH assessments.   Patient  is agreeable to this plan.

## 2024-08-29 ENCOUNTER — HOSPITAL ENCOUNTER (OUTPATIENT)
Dept: INTERVENTIONAL RADIOLOGY/VASCULAR | Age: 44
Discharge: HOME | End: 2024-08-31

## 2024-08-29 RX ORDER — SODIUM CHLORIDE 9 MG/ML
INJECTION, SOLUTION INTRAVENOUS CONTINUOUS
Status: DISCONTINUED | OUTPATIENT
Start: 2024-08-29 | End: 2024-09-01 | Stop reason: HOSPADM

## 2024-08-29 NOTE — PROGRESS NOTES
Patient wants to reschedule. Patient was told she could have anesthesia (not versed and fentanyl) and this procedure does not have that booked.

## 2024-09-03 ENCOUNTER — TELEPHONE (OUTPATIENT)
Dept: INTERNAL MEDICINE | Age: 44
End: 2024-09-03

## 2024-09-03 DIAGNOSIS — M17.0 PRIMARY OSTEOARTHRITIS OF BOTH KNEES: Primary | ICD-10-CM

## 2024-09-03 DIAGNOSIS — N13.30 BILATERAL HYDRONEPHROSIS: Primary | ICD-10-CM

## 2024-09-03 NOTE — TELEPHONE ENCOUNTER
I do not mind, but was the reason, because I need to put it on the referral.  And do they mean home PT/OT?

## 2024-09-04 ENCOUNTER — HOSPITAL ENCOUNTER (OUTPATIENT)
Age: 44
Discharge: HOME | End: 2024-09-04
Payer: COMMERCIAL

## 2024-09-04 ENCOUNTER — CARE COORDINATION (OUTPATIENT)
Dept: CARE COORDINATION | Age: 44
End: 2024-09-04

## 2024-09-04 DIAGNOSIS — N13.30 BILATERAL HYDRONEPHROSIS: ICD-10-CM

## 2024-09-04 DIAGNOSIS — N13.30 BILATERAL HYDRONEPHROSIS: Primary | ICD-10-CM

## 2024-09-04 PROCEDURE — 87086 URINE CULTURE/COLONY COUNT: CPT

## 2024-09-04 NOTE — CARE COORDINATION
Ambulatory Care Coordination Note     9/4/2024 1:09 PM     Patient outreach attempt by this ACM today to perform care management follow up . ACM was unable to reach the patient by telephone today; left voice message requesting a return phone call to this ACM.     ACM: Katerine Vogel RN     Care Summary Note:     She has:          Cervical squamous cell carcinoma, anna nephrostomy tubes in place- on medications follows with radiation oncology, oncology,  urology and PCP     [facility]  care     Plan of Care : Continue assessments, education and support                          RPM- N/A                          Review medications, SDOH and HC decision maker                          Tube exchange 8/29/2024 completed- next scheduled 9/26/2024     care- nursing in place- per chart review- PT and OT ordered      PCP/Specialist follow up:   Future Appointments         Provider Specialty Dept Phone    9/10/2024 11:00 AM Madison Valerio PA-C Gynecologic Oncology [phone]    9/10/2024 2:20 PM Miranda Sofia MD Internal Medicine [phone]    9/10/2024 3:00 PM Tera Lomeli MD Oncology [phone]    9/12/2024  8:00 AM [facility] MED ONC CHAIR 06 Infusion Therapy [phone]    9/18/2024 11:20 AM Enmanuel Crowell MD Urology [phone]    9/26/2024 12:30 PM Radiologist, St[facility] Interventional; STV INTERVENT RN POOL; STV INTERVENT RM 1 Radiology Special Procedures [phone]    10/29/2024 2:15 PM Jeremy Vinson MD; SCHEDULE, [facility] RAD ONC NURSE Radiation Oncology [phone]    11/21/2024 12:30 PM Radiologist, St[facility] Interventional; STV INTERVENT RN POOL; STV INTERVENT RM 1 Radiology Special Procedures [phone]            Follow Up:   Plan for next [facility] outreach in approximately 1 week to complete:  - SDOH assessments  - medication review  - advance care planning  - education .

## 2024-09-05 LAB
MICROORGANISM SPEC CULT: NORMAL
SERVICE CMNT-IMP: NORMAL
SPECIMEN DESCRIPTION: NORMAL

## 2024-09-09 ENCOUNTER — TELEPHONE (OUTPATIENT)
Dept: UROLOGY | Age: 44
End: 2024-09-09

## 2024-09-10 ENCOUNTER — OFFICE VISIT (OUTPATIENT)
Dept: GYNECOLOGIC ONCOLOGY | Age: 44
End: 2024-09-10
Payer: COMMERCIAL

## 2024-09-10 ENCOUNTER — OFFICE VISIT (OUTPATIENT)
Dept: INTERNAL MEDICINE | Age: 44
End: 2024-09-10
Payer: COMMERCIAL

## 2024-09-10 ENCOUNTER — OFFICE VISIT (OUTPATIENT)
Dept: ONCOLOGY | Age: 44
End: 2024-09-10
Payer: COMMERCIAL

## 2024-09-10 VITALS
SYSTOLIC BLOOD PRESSURE: 138 MMHG | BODY MASS INDEX: 37.51 KG/M2 | DIASTOLIC BLOOD PRESSURE: 78 MMHG | HEIGHT: 67 IN | OXYGEN SATURATION: 96 % | WEIGHT: 239 LBS | HEART RATE: 74 BPM | TEMPERATURE: 97.6 F

## 2024-09-10 VITALS
DIASTOLIC BLOOD PRESSURE: 78 MMHG | HEIGHT: 67 IN | OXYGEN SATURATION: 96 % | HEART RATE: 74 BPM | SYSTOLIC BLOOD PRESSURE: 138 MMHG | RESPIRATION RATE: 16 BRPM | BODY MASS INDEX: 37.56 KG/M2 | WEIGHT: 239.3 LBS

## 2024-09-10 VITALS
BODY MASS INDEX: 37.46 KG/M2 | HEART RATE: 116 BPM | WEIGHT: 239.2 LBS | SYSTOLIC BLOOD PRESSURE: 143 MMHG | TEMPERATURE: 97.6 F | OXYGEN SATURATION: 100 % | DIASTOLIC BLOOD PRESSURE: 99 MMHG

## 2024-09-10 DIAGNOSIS — C53.9 PRIMARY CERVICAL SQUAMOUS CELL CARCINOMA (HCC): Primary | ICD-10-CM

## 2024-09-10 DIAGNOSIS — Z93.6 NEPHROSTOMY STATUS (HCC): ICD-10-CM

## 2024-09-10 DIAGNOSIS — Z51.11 CHEMOTHERAPY MANAGEMENT, ENCOUNTER FOR: ICD-10-CM

## 2024-09-10 DIAGNOSIS — F32.A DEPRESSION, UNSPECIFIED DEPRESSION TYPE: ICD-10-CM

## 2024-09-10 DIAGNOSIS — E61.1 IRON DEFICIENCY: ICD-10-CM

## 2024-09-10 DIAGNOSIS — G89.3 CANCER ASSOCIATED PAIN: ICD-10-CM

## 2024-09-10 DIAGNOSIS — C53.0 MALIGNANT NEOPLASM OF ENDOCERVIX (HCC): Primary | ICD-10-CM

## 2024-09-10 PROCEDURE — 99215 OFFICE O/P EST HI 40 MIN: CPT | Performed by: INTERNAL MEDICINE

## 2024-09-10 PROCEDURE — 99215 OFFICE O/P EST HI 40 MIN: CPT | Performed by: PHYSICIAN ASSISTANT

## 2024-09-10 PROCEDURE — 99214 OFFICE O/P EST MOD 30 MIN: CPT | Performed by: INTERNAL MEDICINE

## 2024-09-10 RX ORDER — FAMOTIDINE 10 MG/ML
20 INJECTION, SOLUTION INTRAVENOUS
Status: CANCELLED | OUTPATIENT
Start: 2024-09-12

## 2024-09-10 RX ORDER — FAMOTIDINE 10 MG/ML
20 INJECTION, SOLUTION INTRAVENOUS ONCE
Status: CANCELLED | OUTPATIENT
Start: 2024-09-12 | End: 2024-09-12

## 2024-09-10 RX ORDER — EPINEPHRINE 1 MG/ML
0.3 INJECTION, SOLUTION, CONCENTRATE INTRAVENOUS PRN
Status: CANCELLED | OUTPATIENT
Start: 2024-09-12

## 2024-09-10 RX ORDER — ALBUTEROL SULFATE 90 UG/1
4 AEROSOL, METERED RESPIRATORY (INHALATION) PRN
Status: CANCELLED | OUTPATIENT
Start: 2024-09-12

## 2024-09-10 RX ORDER — SODIUM CHLORIDE 9 MG/ML
INJECTION, SOLUTION INTRAVENOUS CONTINUOUS
Status: CANCELLED | OUTPATIENT
Start: 2024-09-12

## 2024-09-10 RX ORDER — MEPERIDINE HYDROCHLORIDE 50 MG/ML
12.5 INJECTION INTRAMUSCULAR; INTRAVENOUS; SUBCUTANEOUS PRN
Status: CANCELLED | OUTPATIENT
Start: 2024-09-12

## 2024-09-10 RX ORDER — SODIUM CHLORIDE 9 MG/ML
5-250 INJECTION, SOLUTION INTRAVENOUS PRN
Status: CANCELLED | OUTPATIENT
Start: 2024-09-12

## 2024-09-10 RX ORDER — DIPHENHYDRAMINE HYDROCHLORIDE 50 MG/ML
50 INJECTION INTRAMUSCULAR; INTRAVENOUS
Status: CANCELLED | OUTPATIENT
Start: 2024-09-12

## 2024-09-10 RX ORDER — HEPARIN SODIUM (PORCINE) LOCK FLUSH IV SOLN 100 UNIT/ML 100 UNIT/ML
500 SOLUTION INTRAVENOUS PRN
Status: CANCELLED | OUTPATIENT
Start: 2024-09-12

## 2024-09-10 RX ORDER — ACETAMINOPHEN 325 MG/1
650 TABLET ORAL
Status: CANCELLED | OUTPATIENT
Start: 2024-09-12

## 2024-09-10 RX ORDER — CEPHALEXIN 500 MG/1
500 CAPSULE ORAL 4 TIMES DAILY
COMMUNITY
Start: 2024-09-04

## 2024-09-10 RX ORDER — SODIUM CHLORIDE 0.9 % (FLUSH) 0.9 %
5-40 SYRINGE (ML) INJECTION PRN
Status: CANCELLED | OUTPATIENT
Start: 2024-09-12

## 2024-09-10 RX ORDER — PROCHLORPERAZINE EDISYLATE 5 MG/ML
5 INJECTION INTRAMUSCULAR; INTRAVENOUS
Status: CANCELLED | OUTPATIENT
Start: 2024-09-12

## 2024-09-10 RX ORDER — ONDANSETRON 2 MG/ML
8 INJECTION INTRAMUSCULAR; INTRAVENOUS
Status: CANCELLED | OUTPATIENT
Start: 2024-09-12

## 2024-09-10 RX ORDER — DIPHENHYDRAMINE HYDROCHLORIDE 50 MG/ML
50 INJECTION INTRAMUSCULAR; INTRAVENOUS ONCE
Status: CANCELLED | OUTPATIENT
Start: 2024-09-12 | End: 2024-09-12

## 2024-09-10 RX ORDER — PALONOSETRON 0.05 MG/ML
0.25 INJECTION, SOLUTION INTRAVENOUS ONCE
Status: CANCELLED | OUTPATIENT
Start: 2024-09-12 | End: 2024-09-12

## 2024-09-10 ASSESSMENT — ENCOUNTER SYMPTOMS
EYE PROBLEMS: 0
NAUSEA: 0
BACK PAIN: 0
COUGH: 0
VOMITING: 0
CHEST TIGHTNESS: 0
SHORTNESS OF BREATH: 0
ABDOMINAL PAIN: 0
WHEEZING: 0
BLOOD IN STOOL: 0
CONSTIPATION: 1
VOICE CHANGE: 0
RECTAL PAIN: 0
SCLERAL ICTERUS: 0
DIARRHEA: 1
SORE THROAT: 0
HEMOPTYSIS: 0
TROUBLE SWALLOWING: 0
ABDOMINAL DISTENTION: 0

## 2024-09-11 ENCOUNTER — TELEPHONE (OUTPATIENT)
Dept: ONCOLOGY | Age: 44
End: 2024-09-11

## 2024-09-11 ENCOUNTER — CARE COORDINATION (OUTPATIENT)
Dept: CARE COORDINATION | Age: 44
End: 2024-09-11

## 2024-09-11 SDOH — SOCIAL STABILITY: SOCIAL NETWORK
DO YOU BELONG TO ANY CLUBS OR ORGANIZATIONS SUCH AS CHURCH GROUPS UNIONS, FRATERNAL OR ATHLETIC GROUPS, OR SCHOOL GROUPS?: NO

## 2024-09-11 SDOH — ECONOMIC STABILITY: FOOD INSECURITY: WITHIN THE PAST 12 MONTHS, YOU WORRIED THAT YOUR FOOD WOULD RUN OUT BEFORE YOU GOT MONEY TO BUY MORE.: NEVER TRUE

## 2024-09-11 SDOH — SOCIAL STABILITY: SOCIAL NETWORK: ARE YOU MARRIED, WIDOWED, DIVORCED, SEPARATED, NEVER MARRIED, OR LIVING WITH A PARTNER?: MARRIED

## 2024-09-11 SDOH — SOCIAL STABILITY: SOCIAL NETWORK: HOW OFTEN DO YOU GET TOGETHER WITH FRIENDS OR RELATIVES?: MORE THAN THREE TIMES A WEEK

## 2024-09-11 SDOH — HEALTH STABILITY: MENTAL HEALTH
STRESS IS WHEN SOMEONE FEELS TENSE, NERVOUS, ANXIOUS, OR CAN'T SLEEP AT NIGHT BECAUSE THEIR MIND IS TROUBLED. HOW STRESSED ARE YOU?: ONLY A LITTLE

## 2024-09-11 SDOH — ECONOMIC STABILITY: FOOD INSECURITY: WITHIN THE PAST 12 MONTHS, THE FOOD YOU BOUGHT JUST DIDN'T LAST AND YOU DIDN'T HAVE MONEY TO GET MORE.: NEVER TRUE

## 2024-09-11 SDOH — SOCIAL STABILITY: SOCIAL NETWORK: HOW OFTEN DO YOU ATTENT MEETINGS OF THE CLUB OR ORGANIZATION YOU BELONG TO?: NEVER

## 2024-09-11 SDOH — HEALTH STABILITY: PHYSICAL HEALTH: ON AVERAGE, HOW MANY MINUTES DO YOU ENGAGE IN EXERCISE AT THIS LEVEL?: 0 MIN

## 2024-09-11 SDOH — HEALTH STABILITY: MENTAL HEALTH: HOW OFTEN DO YOU HAVE A DRINK CONTAINING ALCOHOL?: NEVER

## 2024-09-11 SDOH — ECONOMIC STABILITY: INCOME INSECURITY: HOW HARD IS IT FOR YOU TO PAY FOR THE VERY BASICS LIKE FOOD, HOUSING, MEDICAL CARE, AND HEATING?: SOMEWHAT HARD

## 2024-09-11 SDOH — SOCIAL STABILITY: SOCIAL NETWORK: HOW OFTEN DO YOU ATTEND CHURCH OR RELIGIOUS SERVICES?: NEVER

## 2024-09-11 SDOH — ECONOMIC STABILITY: INCOME INSECURITY: IN THE LAST 12 MONTHS, WAS THERE A TIME WHEN YOU WERE NOT ABLE TO PAY THE MORTGAGE OR RENT ON TIME?: NO

## 2024-09-11 SDOH — HEALTH STABILITY: PHYSICAL HEALTH: ON AVERAGE, HOW MANY DAYS PER WEEK DO YOU ENGAGE IN MODERATE TO STRENUOUS EXERCISE (LIKE A BRISK WALK)?: 0 DAYS

## 2024-09-11 SDOH — ECONOMIC STABILITY: TRANSPORTATION INSECURITY
IN THE PAST 12 MONTHS, HAS LACK OF TRANSPORTATION KEPT YOU FROM MEETINGS, WORK, OR FROM GETTING THINGS NEEDED FOR DAILY LIVING?: NO

## 2024-09-11 SDOH — ECONOMIC STABILITY: TRANSPORTATION INSECURITY
IN THE PAST 12 MONTHS, HAS THE LACK OF TRANSPORTATION KEPT YOU FROM MEDICAL APPOINTMENTS OR FROM GETTING MEDICATIONS?: NO

## 2024-09-11 SDOH — SOCIAL STABILITY: SOCIAL NETWORK
IN A TYPICAL WEEK, HOW MANY TIMES DO YOU TALK ON THE PHONE WITH FAMILY, FRIENDS, OR NEIGHBORS?: MORE THAN THREE TIMES A WEEK

## 2024-09-11 SDOH — HEALTH STABILITY: MENTAL HEALTH: HOW MANY STANDARD DRINKS CONTAINING ALCOHOL DO YOU HAVE ON A TYPICAL DAY?: PATIENT DOES NOT DRINK

## 2024-09-12 ENCOUNTER — TELEPHONE (OUTPATIENT)
Dept: ONCOLOGY | Age: 44
End: 2024-09-12

## 2024-09-12 ENCOUNTER — HOSPITAL ENCOUNTER (OUTPATIENT)
Dept: INFUSION THERAPY | Age: 44
Discharge: HOME | End: 2024-09-12
Payer: COMMERCIAL

## 2024-09-12 VITALS
OXYGEN SATURATION: 100 % | HEART RATE: 107 BPM | TEMPERATURE: 97.2 F | DIASTOLIC BLOOD PRESSURE: 79 MMHG | RESPIRATION RATE: 16 BRPM | WEIGHT: 240.4 LBS | SYSTOLIC BLOOD PRESSURE: 136 MMHG | HEIGHT: 67 IN | BODY MASS INDEX: 37.73 KG/M2

## 2024-09-12 DIAGNOSIS — C53.8 MALIGNANT NEOPLASM OF OVERLAPPING SITES OF CERVIX (HCC): ICD-10-CM

## 2024-09-12 DIAGNOSIS — C53.9 PRIMARY CERVICAL SQUAMOUS CELL CARCINOMA (HCC): Primary | ICD-10-CM

## 2024-09-12 LAB
ALBUMIN SERPL-MCNC: 3.7 G/DL (ref 3.5–5.2)
ALBUMIN/GLOB SERPL: 1.1 {RATIO} (ref 1–2.5)
ALP SERPL-CCNC: 129 U/L (ref 35–104)
ALT SERPL-CCNC: 7 U/L (ref 5–33)
ANION GAP SERPL CALCULATED.3IONS-SCNC: 9 MMOL/L (ref 9–17)
AST SERPL-CCNC: 15 U/L
BASOPHILS # BLD: <0.03 K/UL (ref 0–0.2)
BASOPHILS NFR BLD: 1 % (ref 0–2)
BILIRUB SERPL-MCNC: 0.3 MG/DL (ref 0.3–1.2)
BILIRUB UR QL STRIP: NEGATIVE
BUN SERPL-MCNC: 15 MG/DL (ref 6–20)
BUN/CREAT SERPL: 15 (ref 9–20)
CALCIUM SERPL-MCNC: 8.9 MG/DL (ref 8.6–10.4)
CHLORIDE SERPL-SCNC: 105 MMOL/L (ref 98–107)
CLARITY UR: CLEAR
CO2 SERPL-SCNC: 26 MMOL/L (ref 20–31)
COLOR UR: YELLOW
CREAT SERPL-MCNC: 1 MG/DL (ref 0.5–0.9)
EOSINOPHIL # BLD: 0.05 K/UL (ref 0–0.44)
EOSINOPHILS RELATIVE PERCENT: 1 % (ref 1–4)
ERYTHROCYTE [DISTWIDTH] IN BLOOD BY AUTOMATED COUNT: 24.7 % (ref 11.8–14.4)
GFR, ESTIMATED: 71 ML/MIN/1.73M2
GLUCOSE SERPL-MCNC: 100 MG/DL (ref 70–99)
GLUCOSE UR STRIP-MCNC: NEGATIVE MG/DL
HCG UR QL: NEGATIVE
HCT VFR BLD AUTO: 29.1 % (ref 36.3–47.1)
HGB BLD-MCNC: 9.4 G/DL (ref 11.9–15.1)
HGB UR QL STRIP.AUTO: ABNORMAL
IMM GRANULOCYTES # BLD AUTO: 0.03 K/UL (ref 0–0.3)
IMM GRANULOCYTES NFR BLD: 1 %
KETONES UR STRIP-MCNC: NEGATIVE MG/DL
LEUKOCYTE ESTERASE UR QL STRIP: ABNORMAL
LYMPHOCYTES NFR BLD: 1.03 K/UL (ref 1.1–3.7)
LYMPHOCYTES RELATIVE PERCENT: 24 % (ref 24–43)
MCH RBC QN AUTO: 26.8 PG (ref 25.2–33.5)
MCHC RBC AUTO-ENTMCNC: 32.3 G/DL (ref 25.2–33.5)
MCV RBC AUTO: 82.9 FL (ref 82.6–102.9)
MONOCYTES NFR BLD: 0.35 K/UL (ref 0.1–1.2)
MONOCYTES NFR BLD: 8 % (ref 3–12)
NEUTROPHILS NFR BLD: 65 % (ref 36–65)
NEUTS SEG NFR BLD: 2.79 K/UL (ref 1.5–8.1)
NITRITE UR QL STRIP: POSITIVE
NRBC BLD-RTO: 0 PER 100 WBC
PH UR STRIP: 6 [PH] (ref 5–6)
PLATELET # BLD AUTO: 118 K/UL (ref 138–453)
PMV BLD AUTO: 8 FL (ref 8.1–13.5)
POTASSIUM SERPL-SCNC: 4.4 MMOL/L (ref 3.7–5.3)
PROT SERPL-MCNC: 7 G/DL (ref 6.4–8.3)
PROT UR STRIP-MCNC: NEGATIVE MG/DL
RBC # BLD AUTO: 3.51 M/UL (ref 3.95–5.11)
SODIUM SERPL-SCNC: 140 MMOL/L (ref 135–144)
SP GR UR STRIP: 1.01 (ref 1.01–1.02)
UROBILINOGEN UR STRIP-ACNC: NORMAL EU/DL (ref 0–1)
WBC OTHER # BLD: 4.3 K/UL (ref 3.5–11.3)

## 2024-09-12 PROCEDURE — 96415 CHEMO IV INFUSION ADDL HR: CPT

## 2024-09-12 PROCEDURE — 6360000002 HC RX W HCPCS: Performed by: INTERNAL MEDICINE

## 2024-09-12 PROCEDURE — 2500000003 HC RX 250 WO HCPCS: Performed by: INTERNAL MEDICINE

## 2024-09-12 PROCEDURE — 81003 URINALYSIS AUTO W/O SCOPE: CPT

## 2024-09-12 PROCEDURE — 80053 COMPREHEN METABOLIC PANEL: CPT

## 2024-09-12 PROCEDURE — 36591 DRAW BLOOD OFF VENOUS DEVICE: CPT

## 2024-09-12 PROCEDURE — 96413 CHEMO IV INFUSION 1 HR: CPT

## 2024-09-12 PROCEDURE — 96367 TX/PROPH/DG ADDL SEQ IV INF: CPT

## 2024-09-12 PROCEDURE — 96375 TX/PRO/DX INJ NEW DRUG ADDON: CPT

## 2024-09-12 PROCEDURE — 2580000003 HC RX 258: Performed by: INTERNAL MEDICINE

## 2024-09-12 PROCEDURE — 81025 URINE PREGNANCY TEST: CPT

## 2024-09-12 PROCEDURE — 85025 COMPLETE CBC W/AUTO DIFF WBC: CPT

## 2024-09-12 PROCEDURE — 96417 CHEMO IV INFUS EACH ADDL SEQ: CPT

## 2024-09-12 RX ORDER — DIPHENHYDRAMINE HYDROCHLORIDE 50 MG/ML
50 INJECTION INTRAMUSCULAR; INTRAVENOUS ONCE
Status: COMPLETED | OUTPATIENT
Start: 2024-09-12 | End: 2024-09-12

## 2024-09-12 RX ORDER — SODIUM CHLORIDE 9 MG/ML
5-250 INJECTION, SOLUTION INTRAVENOUS PRN
Status: DISCONTINUED | OUTPATIENT
Start: 2024-09-12 | End: 2024-09-13 | Stop reason: HOSPADM

## 2024-09-12 RX ORDER — HEPARIN 100 UNIT/ML
500 SYRINGE INTRAVENOUS PRN
Status: DISCONTINUED | OUTPATIENT
Start: 2024-09-12 | End: 2024-09-13 | Stop reason: HOSPADM

## 2024-09-12 RX ORDER — SODIUM CHLORIDE 0.9 % (FLUSH) 0.9 %
5-40 SYRINGE (ML) INJECTION PRN
Status: DISCONTINUED | OUTPATIENT
Start: 2024-09-12 | End: 2024-09-13 | Stop reason: HOSPADM

## 2024-09-12 RX ORDER — PALONOSETRON 0.05 MG/ML
0.25 INJECTION, SOLUTION INTRAVENOUS ONCE
Status: COMPLETED | OUTPATIENT
Start: 2024-09-12 | End: 2024-09-12

## 2024-09-12 RX ORDER — DEXAMETHASONE SODIUM PHOSPHATE 10 MG/ML
10 INJECTION, SOLUTION INTRAMUSCULAR; INTRAVENOUS ONCE
Status: COMPLETED | OUTPATIENT
Start: 2024-09-12 | End: 2024-09-12

## 2024-09-12 RX ADMIN — PALONOSETRON 0.25 MG: 0.05 INJECTION, SOLUTION INTRAVENOUS at 09:03

## 2024-09-12 RX ADMIN — SODIUM CHLORIDE, PRESERVATIVE FREE 20 MG: 5 INJECTION INTRAVENOUS at 09:02

## 2024-09-12 RX ADMIN — DEXAMETHASONE SODIUM PHOSPHATE 10 MG: 10 INJECTION, SOLUTION INTRAMUSCULAR; INTRAVENOUS at 09:02

## 2024-09-12 RX ADMIN — SODIUM CHLORIDE 150 MG: 9 INJECTION, SOLUTION INTRAVENOUS at 09:18

## 2024-09-12 RX ADMIN — PACLITAXEL 396 MG: 6 INJECTION, SOLUTION INTRAVENOUS at 09:49

## 2024-09-12 RX ADMIN — SODIUM CHLORIDE, PRESERVATIVE FREE 10 ML: 5 INJECTION INTRAVENOUS at 13:40

## 2024-09-12 RX ADMIN — SODIUM CHLORIDE 30 ML/HR: 9 INJECTION, SOLUTION INTRAVENOUS at 08:04

## 2024-09-12 RX ADMIN — CARBOPLATIN 575 MG: 10 INJECTION, SOLUTION INTRAVENOUS at 12:56

## 2024-09-12 RX ADMIN — DIPHENHYDRAMINE HYDROCHLORIDE 50 MG: 50 INJECTION INTRAMUSCULAR; INTRAVENOUS at 09:02

## 2024-09-12 RX ADMIN — HEPARIN 500 UNITS: 100 SYRINGE at 13:40

## 2024-09-16 ENCOUNTER — HOSPITAL ENCOUNTER (OUTPATIENT)
Dept: CT IMAGING | Age: 44
Discharge: HOME | End: 2024-09-18
Attending: INTERNAL MEDICINE
Payer: COMMERCIAL

## 2024-09-16 DIAGNOSIS — C53.9 PRIMARY CERVICAL SQUAMOUS CELL CARCINOMA (HCC): ICD-10-CM

## 2024-09-16 PROCEDURE — 2500000003 HC RX 250 WO HCPCS: Performed by: INTERNAL MEDICINE

## 2024-09-16 PROCEDURE — 6360000004 HC RX CONTRAST MEDICATION: Performed by: INTERNAL MEDICINE

## 2024-09-16 PROCEDURE — 71260 CT THORAX DX C+: CPT

## 2024-09-16 RX ORDER — IOPAMIDOL 755 MG/ML
100 INJECTION, SOLUTION INTRAVASCULAR
Status: COMPLETED | OUTPATIENT
Start: 2024-09-16 | End: 2024-09-16

## 2024-09-16 RX ADMIN — IOPAMIDOL 100 ML: 755 INJECTION, SOLUTION INTRAVENOUS at 10:21

## 2024-09-16 RX ADMIN — BARIUM SULFATE 450 ML: 20 SUSPENSION ORAL at 10:21

## 2024-09-18 ENCOUNTER — OFFICE VISIT (OUTPATIENT)
Dept: UROLOGY | Age: 44
End: 2024-09-18

## 2024-09-18 VITALS
SYSTOLIC BLOOD PRESSURE: 128 MMHG | BODY MASS INDEX: 37.61 KG/M2 | OXYGEN SATURATION: 99 % | WEIGHT: 239.6 LBS | RESPIRATION RATE: 18 BRPM | HEIGHT: 67 IN | HEART RATE: 127 BPM | DIASTOLIC BLOOD PRESSURE: 80 MMHG

## 2024-09-18 DIAGNOSIS — N13.30 BILATERAL HYDRONEPHROSIS: Primary | ICD-10-CM

## 2024-09-18 DIAGNOSIS — R19.00 PELVIC MASS IN FEMALE: ICD-10-CM

## 2024-09-19 ENCOUNTER — CARE COORDINATION (OUTPATIENT)
Dept: CARE COORDINATION | Age: 44
End: 2024-09-19

## 2024-09-26 ENCOUNTER — ANESTHESIA EVENT (OUTPATIENT)
Dept: INTERVENTIONAL RADIOLOGY/VASCULAR | Age: 44
End: 2024-09-26
Payer: COMMERCIAL

## 2024-09-26 ENCOUNTER — HOSPITAL ENCOUNTER (OUTPATIENT)
Dept: INTERVENTIONAL RADIOLOGY/VASCULAR | Age: 44
Discharge: HOME | End: 2024-09-28
Payer: COMMERCIAL

## 2024-09-26 ENCOUNTER — ANESTHESIA (OUTPATIENT)
Dept: INTERVENTIONAL RADIOLOGY/VASCULAR | Age: 44
End: 2024-09-26
Payer: COMMERCIAL

## 2024-09-26 ENCOUNTER — TELEPHONE (OUTPATIENT)
Dept: INTERNAL MEDICINE | Age: 44
End: 2024-09-26

## 2024-09-26 VITALS
RESPIRATION RATE: 16 BRPM | HEART RATE: 88 BPM | DIASTOLIC BLOOD PRESSURE: 95 MMHG | SYSTOLIC BLOOD PRESSURE: 148 MMHG | OXYGEN SATURATION: 100 % | TEMPERATURE: 96.8 F

## 2024-09-26 DIAGNOSIS — N13.30 BILATERAL HYDRONEPHROSIS: ICD-10-CM

## 2024-09-26 PROCEDURE — 2580000003 HC RX 258: Performed by: SPECIALIST

## 2024-09-26 PROCEDURE — 2500000003 HC RX 250 WO HCPCS: Performed by: SPECIALIST

## 2024-09-26 PROCEDURE — 6360000004 HC RX CONTRAST MEDICATION: Performed by: UROLOGY

## 2024-09-26 PROCEDURE — C1769 GUIDE WIRE: HCPCS

## 2024-09-26 PROCEDURE — 6360000002 HC RX W HCPCS: Performed by: SPECIALIST

## 2024-09-26 PROCEDURE — 50435 EXCHANGE NEPHROSTOMY CATH: CPT

## 2024-09-26 RX ORDER — SODIUM CHLORIDE 0.9 % (FLUSH) 0.9 %
5-40 SYRINGE (ML) INJECTION EVERY 12 HOURS SCHEDULED
Status: DISCONTINUED | OUTPATIENT
Start: 2024-09-26 | End: 2024-09-29 | Stop reason: HOSPADM

## 2024-09-26 RX ORDER — SODIUM CHLORIDE 0.9 % (FLUSH) 0.9 %
5-40 SYRINGE (ML) INJECTION PRN
Status: DISCONTINUED | OUTPATIENT
Start: 2024-09-26 | End: 2024-09-29 | Stop reason: HOSPADM

## 2024-09-26 RX ORDER — SODIUM CHLORIDE, SODIUM LACTATE, POTASSIUM CHLORIDE, CALCIUM CHLORIDE 600; 310; 30; 20 MG/100ML; MG/100ML; MG/100ML; MG/100ML
INJECTION, SOLUTION INTRAVENOUS
Status: DISCONTINUED | OUTPATIENT
Start: 2024-09-26 | End: 2024-09-26 | Stop reason: SDUPTHER

## 2024-09-26 RX ORDER — MIDAZOLAM HYDROCHLORIDE 1 MG/ML
INJECTION INTRAMUSCULAR; INTRAVENOUS
Status: DISCONTINUED | OUTPATIENT
Start: 2024-09-26 | End: 2024-09-26 | Stop reason: SDUPTHER

## 2024-09-26 RX ORDER — NALOXONE HYDROCHLORIDE 0.4 MG/ML
INJECTION, SOLUTION INTRAMUSCULAR; INTRAVENOUS; SUBCUTANEOUS PRN
Status: DISCONTINUED | OUTPATIENT
Start: 2024-09-26 | End: 2024-09-29 | Stop reason: HOSPADM

## 2024-09-26 RX ORDER — DIPHENHYDRAMINE HYDROCHLORIDE 50 MG/ML
12.5 INJECTION INTRAMUSCULAR; INTRAVENOUS
Status: ACTIVE | OUTPATIENT
Start: 2024-09-26 | End: 2024-09-27

## 2024-09-26 RX ORDER — SODIUM CHLORIDE 9 MG/ML
INJECTION, SOLUTION INTRAVENOUS PRN
Status: DISCONTINUED | OUTPATIENT
Start: 2024-09-26 | End: 2024-09-29 | Stop reason: HOSPADM

## 2024-09-26 RX ORDER — ONDANSETRON 2 MG/ML
INJECTION INTRAMUSCULAR; INTRAVENOUS
Status: DISCONTINUED | OUTPATIENT
Start: 2024-09-26 | End: 2024-09-26 | Stop reason: SDUPTHER

## 2024-09-26 RX ORDER — MEPERIDINE HYDROCHLORIDE 50 MG/ML
12.5 INJECTION INTRAMUSCULAR; INTRAVENOUS; SUBCUTANEOUS EVERY 5 MIN PRN
Status: DISCONTINUED | OUTPATIENT
Start: 2024-09-26 | End: 2024-09-29 | Stop reason: HOSPADM

## 2024-09-26 RX ORDER — FENTANYL CITRATE 50 UG/ML
INJECTION, SOLUTION INTRAMUSCULAR; INTRAVENOUS
Status: DISCONTINUED | OUTPATIENT
Start: 2024-09-26 | End: 2024-09-26 | Stop reason: SDUPTHER

## 2024-09-26 RX ORDER — METOCLOPRAMIDE HYDROCHLORIDE 5 MG/ML
10 INJECTION INTRAMUSCULAR; INTRAVENOUS
Status: ACTIVE | OUTPATIENT
Start: 2024-09-26 | End: 2024-09-27

## 2024-09-26 RX ORDER — HYDRALAZINE HYDROCHLORIDE 20 MG/ML
10 INJECTION INTRAMUSCULAR; INTRAVENOUS
Status: DISCONTINUED | OUTPATIENT
Start: 2024-09-26 | End: 2024-09-29 | Stop reason: HOSPADM

## 2024-09-26 RX ORDER — SODIUM CHLORIDE 9 MG/ML
INJECTION, SOLUTION INTRAVENOUS CONTINUOUS
Status: DISCONTINUED | OUTPATIENT
Start: 2024-09-26 | End: 2024-09-29 | Stop reason: HOSPADM

## 2024-09-26 RX ORDER — DROPERIDOL 2.5 MG/ML
0.62 INJECTION, SOLUTION INTRAMUSCULAR; INTRAVENOUS
Status: DISPENSED | OUTPATIENT
Start: 2024-09-26 | End: 2024-09-27

## 2024-09-26 RX ORDER — PROPOFOL 10 MG/ML
INJECTION, EMULSION INTRAVENOUS
Status: DISCONTINUED | OUTPATIENT
Start: 2024-09-26 | End: 2024-09-26 | Stop reason: SDUPTHER

## 2024-09-26 RX ADMIN — Medication 29 MG: at 13:32

## 2024-09-26 RX ADMIN — FENTANYL CITRATE 50 MCG: 50 INJECTION, SOLUTION INTRAMUSCULAR; INTRAVENOUS at 13:06

## 2024-09-26 RX ADMIN — MIDAZOLAM HYDROCHLORIDE 0.5 MG: 1 INJECTION INTRAMUSCULAR; INTRAVENOUS at 13:12

## 2024-09-26 RX ADMIN — FENTANYL CITRATE 25 MCG: 50 INJECTION, SOLUTION INTRAMUSCULAR; INTRAVENOUS at 13:22

## 2024-09-26 RX ADMIN — IOHEXOL 10 ML: 240 INJECTION, SOLUTION INTRATHECAL; INTRAVASCULAR; INTRAVENOUS; ORAL at 13:33

## 2024-09-26 RX ADMIN — MIDAZOLAM HYDROCHLORIDE 0.5 MG: 1 INJECTION INTRAMUSCULAR; INTRAVENOUS at 13:22

## 2024-09-26 RX ADMIN — MIDAZOLAM HYDROCHLORIDE 1 MG: 1 INJECTION INTRAMUSCULAR; INTRAVENOUS at 13:06

## 2024-09-26 RX ADMIN — FENTANYL CITRATE 25 MCG: 50 INJECTION, SOLUTION INTRAMUSCULAR; INTRAVENOUS at 13:12

## 2024-09-26 RX ADMIN — ONDANSETRON 4 MG: 2 INJECTION INTRAMUSCULAR; INTRAVENOUS at 13:36

## 2024-09-26 RX ADMIN — SODIUM CHLORIDE, SODIUM LACTATE, POTASSIUM CHLORIDE, CALCIUM CHLORIDE: 600; 310; 30; 20 INJECTION, SOLUTION INTRAVENOUS at 13:02

## 2024-09-26 RX ADMIN — PROPOFOL 120 MCG/KG/MIN: 10 INJECTION, EMULSION INTRAVENOUS at 13:06

## 2024-09-26 ASSESSMENT — PAIN SCALES - GENERAL: PAINLEVEL_OUTOF10: 0

## 2024-09-26 NOTE — ANESTHESIA PRE PROCEDURE
Department of Anesthesiology  Preprocedure Note       Name:  Evangelina King   Age:  44 y.o.  :  1980                                          MRN:  [id number]         Date:  2024      Surgeon: * No surgeons listed *    Procedure: * No procedures listed *    Medications prior to admission:   Prior to Admission medications    Medication Sig Start Date End Date Taking? Authorizing Provider   Psyllium (METAMUCIL PO) Take by mouth    Provider, MD Swathi   gabapentin (NEURONTIN) 300 MG capsule Take 1 capsule by mouth nightly as needed (pain) for up to 180 days. 8/15/24 2/11/25  Miranda Sofia MD   magnesium hydroxide (MILK OF MAGNESIA) 400 MG/5ML suspension Take 30 mLs by mouth daily as needed for Constipation  Patient not taking: Reported on 2024   Tera Lomeli MD   sertraline (ZOLOFT) 50 MG tablet Take 1 tablet by mouth daily 24   Miranda Sofia MD   ondansetron (ZOFRAN) 4 MG tablet Take 1 tablet by mouth every 8 hours as needed for Nausea or Vomiting  Patient not taking: Reported on 2024 7/3/24   Jeremy Vinson MD   acetaminophen (TYLENOL) 500 MG tablet Take 2 tablets by mouth every 6 hours as needed for Pain 24  Mercy Villatoro DO       Current medications:    Current Outpatient Medications   Medication Sig Dispense Refill   • Psyllium (METAMUCIL PO) Take by mouth     • gabapentin (NEURONTIN) 300 MG capsule Take 1 capsule by mouth nightly as needed (pain) for up to 180 days. 90 capsule 1   • magnesium hydroxide (MILK OF MAGNESIA) 400 MG/5ML suspension Take 30 mLs by mouth daily as needed for Constipation (Patient not taking: Reported on 2024) 354 mL 1   • sertraline (ZOLOFT) 50 MG tablet Take 1 tablet by mouth daily 90 tablet 1   • ondansetron (ZOFRAN) 4 MG tablet Take 1 tablet by mouth every 8 hours as needed for Nausea or Vomiting (Patient not taking: Reported on 2024) 90 tablet 0   • acetaminophen (TYLENOL) 500 MG tablet Take 2 tablets by mouth every 6 hours

## 2024-09-30 NOTE — ANESTHESIA POSTPROCEDURE EVALUATION
Department of Anesthesiology  Postprocedure Note    Patient: Evangelina King  MRN: [id number]  YOB: 1980  Date of evaluation: 9/30/2024    Procedure Summary       Date: 09/26/24 Room / Location: [facility] Procedures    Anesthesia Start: 1302 Anesthesia Stop: 1349    Procedure: IR TUBE CHANGE Diagnosis: Bilateral hydronephrosis    Scheduled Providers: Radiologist, [facility] Interventional Responsible Provider: Matty Nolasco MD    Anesthesia Type: general, MAC ASA Status: 2            Anesthesia Type: No value filed.    Analia Phase I:      Analia Phase II: Analia Score: 10    Anesthesia Post Evaluation    Patient location during evaluation: bedside  Patient participation: complete - patient participated  Level of consciousness: awake and alert  Airway patency: patent  Nausea & Vomiting: no nausea and no vomiting  Cardiovascular status: hemodynamically stable  Respiratory status: acceptable  Hydration status: stable  Comments: BP (!) 148/95   Pulse 88   Temp 96.8 °F (36 °C)   Resp 16   SpO2 100%     Pain management: adequate    No notable events documented.

## 2024-10-01 ENCOUNTER — OFFICE VISIT (OUTPATIENT)
Dept: ONCOLOGY | Age: 44
End: 2024-10-01
Payer: COMMERCIAL

## 2024-10-01 VITALS
TEMPERATURE: 97 F | BODY MASS INDEX: 36.88 KG/M2 | HEIGHT: 67 IN | SYSTOLIC BLOOD PRESSURE: 118 MMHG | DIASTOLIC BLOOD PRESSURE: 74 MMHG | OXYGEN SATURATION: 98 % | WEIGHT: 235 LBS | HEART RATE: 100 BPM

## 2024-10-01 DIAGNOSIS — Z51.11 CHEMOTHERAPY MANAGEMENT, ENCOUNTER FOR: ICD-10-CM

## 2024-10-01 DIAGNOSIS — C53.9 PRIMARY CERVICAL SQUAMOUS CELL CARCINOMA (HCC): Primary | ICD-10-CM

## 2024-10-01 PROCEDURE — 99215 OFFICE O/P EST HI 40 MIN: CPT | Performed by: INTERNAL MEDICINE

## 2024-10-01 RX ORDER — FAMOTIDINE 10 MG/ML
20 INJECTION, SOLUTION INTRAVENOUS
OUTPATIENT
Start: 2024-10-03

## 2024-10-01 RX ORDER — HEPARIN SODIUM (PORCINE) LOCK FLUSH IV SOLN 100 UNIT/ML 100 UNIT/ML
500 SOLUTION INTRAVENOUS PRN
Status: CANCELLED | OUTPATIENT
Start: 2024-10-03

## 2024-10-01 RX ORDER — SODIUM CHLORIDE 9 MG/ML
INJECTION, SOLUTION INTRAVENOUS CONTINUOUS
OUTPATIENT
Start: 2024-10-03

## 2024-10-01 RX ORDER — DIPHENHYDRAMINE HYDROCHLORIDE 50 MG/ML
50 INJECTION INTRAMUSCULAR; INTRAVENOUS
OUTPATIENT
Start: 2024-10-03

## 2024-10-01 RX ORDER — SODIUM CHLORIDE 9 MG/ML
5-250 INJECTION, SOLUTION INTRAVENOUS PRN
OUTPATIENT
Start: 2024-10-03

## 2024-10-01 RX ORDER — PALONOSETRON 0.05 MG/ML
0.25 INJECTION, SOLUTION INTRAVENOUS ONCE
Status: CANCELLED | OUTPATIENT
Start: 2024-10-03 | End: 2024-10-03

## 2024-10-01 RX ORDER — FAMOTIDINE 10 MG/ML
20 INJECTION, SOLUTION INTRAVENOUS ONCE
Status: CANCELLED | OUTPATIENT
Start: 2024-10-03 | End: 2024-10-03

## 2024-10-01 RX ORDER — DIPHENHYDRAMINE HYDROCHLORIDE 50 MG/ML
50 INJECTION INTRAMUSCULAR; INTRAVENOUS ONCE
Status: CANCELLED | OUTPATIENT
Start: 2024-10-03 | End: 2024-10-03

## 2024-10-01 RX ORDER — ONDANSETRON 2 MG/ML
8 INJECTION INTRAMUSCULAR; INTRAVENOUS
OUTPATIENT
Start: 2024-10-03

## 2024-10-01 RX ORDER — PROCHLORPERAZINE EDISYLATE 5 MG/ML
5 INJECTION INTRAMUSCULAR; INTRAVENOUS
OUTPATIENT
Start: 2024-10-03

## 2024-10-01 RX ORDER — EPINEPHRINE 1 MG/ML
0.3 INJECTION, SOLUTION, CONCENTRATE INTRAVENOUS PRN
OUTPATIENT
Start: 2024-10-03

## 2024-10-01 RX ORDER — SODIUM CHLORIDE 0.9 % (FLUSH) 0.9 %
5-40 SYRINGE (ML) INJECTION PRN
OUTPATIENT
Start: 2024-10-03

## 2024-10-01 RX ORDER — MEPERIDINE HYDROCHLORIDE 50 MG/ML
12.5 INJECTION INTRAMUSCULAR; INTRAVENOUS; SUBCUTANEOUS PRN
OUTPATIENT
Start: 2024-10-03

## 2024-10-01 RX ORDER — SODIUM CHLORIDE 9 MG/ML
5-250 INJECTION, SOLUTION INTRAVENOUS PRN
Status: CANCELLED | OUTPATIENT
Start: 2024-10-03

## 2024-10-01 RX ORDER — ALBUTEROL SULFATE 90 UG/1
4 INHALANT RESPIRATORY (INHALATION) PRN
OUTPATIENT
Start: 2024-10-03

## 2024-10-01 RX ORDER — ACETAMINOPHEN 325 MG/1
650 TABLET ORAL
OUTPATIENT
Start: 2024-10-03

## 2024-10-01 NOTE — PROGRESS NOTES
Writer l/m for urology    
Never     Marital Status:    Housing Stability: Low Risk  (9/11/2024)    Housing Stability Vital Sign     Unable to Pay for Housing in the Last Year: No     Number of Times Moved in the Last Year: 1     Homeless in the Last Year: No     Current Medications:  Current Outpatient Medications   Medication Sig Dispense Refill    Psyllium (METAMUCIL PO) Take by mouth      gabapentin (NEURONTIN) 300 MG capsule Take 1 capsule by mouth nightly as needed (pain) for up to 180 days. 90 capsule 1    sertraline (ZOLOFT) 50 MG tablet Take 1 tablet by mouth daily 90 tablet 1    ondansetron (ZOFRAN) 4 MG tablet Take 1 tablet by mouth every 8 hours as needed for Nausea or Vomiting 90 tablet 0    acetaminophen (TYLENOL) 500 MG tablet Take 2 tablets by mouth every 6 hours as needed for Pain 60 tablet 0    magnesium hydroxide (MILK OF MAGNESIA) 400 MG/5ML suspension Take 30 mLs by mouth daily as needed for Constipation (Patient not taking: Reported on 9/18/2024) 354 mL 1     No current facility-administered medications for this visit.       Allergies:   Gabapentin (once-daily) and Morphine    Review of Systems:    Constitutional: No fever or chills. No night sweats, no weight loss   Eyes: No eye discharge, double vision, or eye pain   HEENT: negative for sore mouth, sore throat, hoarseness and voice change   Respiratory: negative for cough , sputum, dyspnea, wheezing, hemoptysis, chest pain   Cardiovascular: negative for chest pain, dyspnea, palpitations, orthopnea, PND   Gastrointestinal: negative for nausea, vomiting, diarrhea, constipation, abdominal pain, Dysphagia, hematemesis and hematochezia   Genitourinary: negative for frequency, dysuria, nocturia, urinary incontinence, and hematuria.  Pelvic pain much improved  Integument: negative for rash, skin lesions, bruises.   Hematologic/Lymphatic: negative for easy bruising, bleeding, lymphadenopathy, or petechiae   Endocrine: negative for heat or cold intolerance,weight

## 2024-10-01 NOTE — PATIENT INSTRUCTIONS
Pt needs cytoscopy with urology needs clearnce to start bevcizumab   Tx as planned   Rv in 3 week

## 2024-10-02 ENCOUNTER — TELEPHONE (OUTPATIENT)
Dept: UROLOGY | Age: 44
End: 2024-10-02

## 2024-10-02 ENCOUNTER — CARE COORDINATION (OUTPATIENT)
Dept: CARE COORDINATION | Age: 44
End: 2024-10-02

## 2024-10-02 ENCOUNTER — TELEPHONE (OUTPATIENT)
Dept: INTERNAL MEDICINE | Age: 44
End: 2024-10-02

## 2024-10-02 DIAGNOSIS — Z93.6 NEPHROSTOMY STATUS (HCC): Primary | ICD-10-CM

## 2024-10-02 NOTE — TELEPHONE ENCOUNTER
Spoke with oncology would like patient scheduled for cystoscopy. Spoke with dr wu he would like to schedule a cystoscopy with possible bladder biopsy in OR under MAC.

## 2024-10-02 NOTE — CARE COORDINATION
Ambulatory Care Coordination Note     10/2/2024 2:11 PM     Patient Current Location:  [facility] contacted the patient by telephone.         ACM: Katerine Vogel, RN    Care Summary Note:     She has:          Cervical squamous cell carcinoma, anna nephrostomy tubes in place- on medications follows with radiation oncology, oncology,  urology and PCP     [facility]  care- nursing, PT/OT     Plan of Care : Continue assessments, education and support                          RPM- N/A    F/U on urology apt- Dr. Santana and cysto                            Spoke with Evangelina. She continues with  nurse, PT.   She saw oncologist and needs a cysto. She would like to use Dr. Santana in [place]. She has been in touch with Dr. Santana's office to see if she can get a sooner apt and schedule a cysto.   She denied pain being an issue at this time.   This writer will f/u with Patient Advocate on Patient's concerns and have her reach out to Patient. Evangelina aware.      PCP/Specialist follow up:   Future Appointments         Provider Specialty Dept Phone    10/3/2024  8:00 AM [facility] MED ONC CHAIR 06 Infusion Therapy [phone]    10/22/2024 11:45 AM Tera Lomeli MD Oncology [phone]    10/29/2024 2:15 PM Jeremy Vinson MD; SCHEDULE, [facility] RAD ONC NURSE Radiation Oncology [phone]    11/21/2024 12:30 PM Radiologist, [facility] Interventional; [facility] INTERVENT RN POOL; STV INTERVENT  1 Radiology Special Procedures [phone]    12/3/2024 11:00 AM Madison Valerio PA-C Gynecologic Oncology [phone]    12/10/2024 2:40 PM Miranda Sofia MD Internal Medicine [phone]    1/15/2025 9:20 AM Enmanuel Crowell MD Urology [phone]            Follow Up:   Plan for next [facility] outreach in approximately 2 weeks to complete:  - goal progression.   Patient  is agreeable to this plan.

## 2024-10-02 NOTE — TELEPHONE ENCOUNTER
Stephane from [facility] called stating that he is extending PT for Evangelina to 1 time a week for 4 weeks. He is also to be faxing [facility]

## 2024-10-02 NOTE — TELEPHONE ENCOUNTER
Spoke with patient, she would like to speak dr rodríguez office for his opinion. Patient is also getting a second opinion and will call when ready to schedule procedure.

## 2024-10-03 ENCOUNTER — HOSPITAL ENCOUNTER (OUTPATIENT)
Dept: INFUSION THERAPY | Age: 44
Discharge: HOME | End: 2024-10-03
Payer: COMMERCIAL

## 2024-10-03 ENCOUNTER — TELEPHONE (OUTPATIENT)
Dept: UROLOGY | Age: 44
End: 2024-10-03

## 2024-10-03 VITALS
SYSTOLIC BLOOD PRESSURE: 113 MMHG | WEIGHT: 233.4 LBS | OXYGEN SATURATION: 100 % | TEMPERATURE: 98 F | HEIGHT: 67 IN | BODY MASS INDEX: 36.63 KG/M2 | DIASTOLIC BLOOD PRESSURE: 75 MMHG | HEART RATE: 103 BPM | RESPIRATION RATE: 16 BRPM

## 2024-10-03 DIAGNOSIS — C53.9 PRIMARY CERVICAL SQUAMOUS CELL CARCINOMA (HCC): Primary | ICD-10-CM

## 2024-10-03 LAB
ALBUMIN SERPL-MCNC: 3.7 G/DL (ref 3.5–5.2)
ALBUMIN/GLOB SERPL: 1.1 {RATIO} (ref 1–2.5)
ALP SERPL-CCNC: 124 U/L (ref 35–104)
ALT SERPL-CCNC: 6 U/L (ref 5–33)
ANION GAP SERPL CALCULATED.3IONS-SCNC: 11 MMOL/L (ref 9–17)
AST SERPL-CCNC: 12 U/L
BASOPHILS # BLD: <0.03 K/UL (ref 0–0.2)
BASOPHILS NFR BLD: 0 % (ref 0–2)
BILIRUB SERPL-MCNC: 0.3 MG/DL (ref 0.3–1.2)
BUN SERPL-MCNC: 9 MG/DL (ref 6–20)
BUN/CREAT SERPL: 10 (ref 9–20)
CALCIUM SERPL-MCNC: 9.6 MG/DL (ref 8.6–10.4)
CHLORIDE SERPL-SCNC: 103 MMOL/L (ref 98–107)
CO2 SERPL-SCNC: 27 MMOL/L (ref 20–31)
CREAT SERPL-MCNC: 0.9 MG/DL (ref 0.5–0.9)
EOSINOPHIL # BLD: 0.03 K/UL (ref 0–0.44)
EOSINOPHILS RELATIVE PERCENT: 1 % (ref 1–4)
ERYTHROCYTE [DISTWIDTH] IN BLOOD BY AUTOMATED COUNT: 23.5 % (ref 11.8–14.4)
GFR, ESTIMATED: 80 ML/MIN/1.73M2
GLUCOSE SERPL-MCNC: 107 MG/DL (ref 70–99)
HCG UR QL: NEGATIVE
HCT VFR BLD AUTO: 24.2 % (ref 36.3–47.1)
HGB BLD-MCNC: 7.9 G/DL (ref 11.9–15.1)
IMM GRANULOCYTES # BLD AUTO: 0.03 K/UL (ref 0–0.3)
IMM GRANULOCYTES NFR BLD: 1 %
LYMPHOCYTES NFR BLD: 0.85 K/UL (ref 1.1–3.7)
LYMPHOCYTES RELATIVE PERCENT: 24 % (ref 24–43)
MCH RBC QN AUTO: 28.6 PG (ref 25.2–33.5)
MCHC RBC AUTO-ENTMCNC: 32.6 G/DL (ref 25.2–33.5)
MCV RBC AUTO: 87.7 FL (ref 82.6–102.9)
MONOCYTES NFR BLD: 0.32 K/UL (ref 0.1–1.2)
MONOCYTES NFR BLD: 9 % (ref 3–12)
NEUTROPHILS NFR BLD: 65 % (ref 36–65)
NEUTS SEG NFR BLD: 2.33 K/UL (ref 1.5–8.1)
NRBC BLD-RTO: 0 PER 100 WBC
PLATELET # BLD AUTO: 140 K/UL (ref 138–453)
PMV BLD AUTO: 8.9 FL (ref 8.1–13.5)
POTASSIUM SERPL-SCNC: 4 MMOL/L (ref 3.7–5.3)
PROT SERPL-MCNC: 7.2 G/DL (ref 6.4–8.3)
RBC # BLD AUTO: 2.76 M/UL (ref 3.95–5.11)
SODIUM SERPL-SCNC: 141 MMOL/L (ref 135–144)
WBC OTHER # BLD: 3.6 K/UL (ref 3.5–11.3)

## 2024-10-03 PROCEDURE — 96367 TX/PROPH/DG ADDL SEQ IV INF: CPT

## 2024-10-03 PROCEDURE — 2580000003 HC RX 258: Performed by: INTERNAL MEDICINE

## 2024-10-03 PROCEDURE — 2500000003 HC RX 250 WO HCPCS: Performed by: INTERNAL MEDICINE

## 2024-10-03 PROCEDURE — 96417 CHEMO IV INFUS EACH ADDL SEQ: CPT

## 2024-10-03 PROCEDURE — 6360000002 HC RX W HCPCS: Performed by: INTERNAL MEDICINE

## 2024-10-03 PROCEDURE — 96413 CHEMO IV INFUSION 1 HR: CPT

## 2024-10-03 PROCEDURE — 96361 HYDRATE IV INFUSION ADD-ON: CPT

## 2024-10-03 PROCEDURE — 96375 TX/PRO/DX INJ NEW DRUG ADDON: CPT

## 2024-10-03 PROCEDURE — 96415 CHEMO IV INFUSION ADDL HR: CPT

## 2024-10-03 PROCEDURE — 36591 DRAW BLOOD OFF VENOUS DEVICE: CPT

## 2024-10-03 PROCEDURE — 80053 COMPREHEN METABOLIC PANEL: CPT

## 2024-10-03 PROCEDURE — 81025 URINE PREGNANCY TEST: CPT

## 2024-10-03 PROCEDURE — 85025 COMPLETE CBC W/AUTO DIFF WBC: CPT

## 2024-10-03 RX ORDER — PALONOSETRON 0.05 MG/ML
0.25 INJECTION, SOLUTION INTRAVENOUS ONCE
Status: COMPLETED | OUTPATIENT
Start: 2024-10-03 | End: 2024-10-03

## 2024-10-03 RX ORDER — DEXAMETHASONE SODIUM PHOSPHATE 10 MG/ML
10 INJECTION, SOLUTION INTRAMUSCULAR; INTRAVENOUS ONCE
Status: COMPLETED | OUTPATIENT
Start: 2024-10-03 | End: 2024-10-03

## 2024-10-03 RX ORDER — HEPARIN 100 UNIT/ML
500 SYRINGE INTRAVENOUS PRN
Status: DISCONTINUED | OUTPATIENT
Start: 2024-10-03 | End: 2024-10-04 | Stop reason: HOSPADM

## 2024-10-03 RX ORDER — SODIUM CHLORIDE 9 MG/ML
5-250 INJECTION, SOLUTION INTRAVENOUS PRN
Status: DISCONTINUED | OUTPATIENT
Start: 2024-10-03 | End: 2024-10-04 | Stop reason: HOSPADM

## 2024-10-03 RX ORDER — DIPHENHYDRAMINE HYDROCHLORIDE 50 MG/ML
50 INJECTION INTRAMUSCULAR; INTRAVENOUS ONCE
Status: COMPLETED | OUTPATIENT
Start: 2024-10-03 | End: 2024-10-03

## 2024-10-03 RX ADMIN — FAMOTIDINE 20 MG: 10 INJECTION, SOLUTION INTRAVENOUS at 09:06

## 2024-10-03 RX ADMIN — SODIUM CHLORIDE 20 ML/HR: 9 INJECTION, SOLUTION INTRAVENOUS at 09:21

## 2024-10-03 RX ADMIN — CARBOPLATIN 620 MG: 10 INJECTION, SOLUTION INTRAVENOUS at 13:00

## 2024-10-03 RX ADMIN — DEXAMETHASONE SODIUM PHOSPHATE 10 MG: 10 INJECTION, SOLUTION INTRAMUSCULAR; INTRAVENOUS at 09:03

## 2024-10-03 RX ADMIN — HEPARIN 500 UNITS: 100 SYRINGE at 13:49

## 2024-10-03 RX ADMIN — PALONOSETRON 0.25 MG: 0.05 INJECTION, SOLUTION INTRAVENOUS at 09:15

## 2024-10-03 RX ADMIN — PACLITAXEL 396 MG: 6 INJECTION, SOLUTION INTRAVENOUS at 09:52

## 2024-10-03 RX ADMIN — SODIUM CHLORIDE 150 MG: 9 INJECTION, SOLUTION INTRAVENOUS at 09:20

## 2024-10-03 RX ADMIN — DIPHENHYDRAMINE HYDROCHLORIDE 50 MG: 50 INJECTION INTRAMUSCULAR; INTRAVENOUS at 09:14

## 2024-10-03 RX ADMIN — SODIUM CHLORIDE 25 ML/HR: 9 INJECTION, SOLUTION INTRAVENOUS at 08:07

## 2024-10-03 NOTE — PROGRESS NOTES
Infusions complete, tolerated well.  Port DC'd after flushed with Heparin.  Ambulatory with steady gait with friend.

## 2024-10-03 NOTE — TELEPHONE ENCOUNTER
Spoke with patient, reviewed instructions with patient, also sent in Strobet message. Patient will call with questions
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
## 2024-10-03 NOTE — PROGRESS NOTES
VAD accessed per protocol with 3/4 inch 20 gauge wiseman needle.  Flushed easily with saline 10 ml.  Good blood return.  Labs drawn. IV infusing normal saline.  Been feeling tired and weak since last week after exchange of nephrostomy tubes.

## 2024-10-03 NOTE — TELEPHONE ENCOUNTER
[facility]     Pre-Operative Evaluation/Consultation    Name:  Evangelina King                                         Age:  44 y.o.  MRN:  [id number]       :  1980   Date:  10/3/2024         Sex: female    Surgeon:  Dr. Crowell  Procedure (Planned):  Cystoscopy with possible bladder biopsy  Date Scheduled surgery: 10/16/24    Attending : No att. providers found    Primary Physician:   Cardiologist: None    Type of Anesthesia Requested: Monitored Anesthesia Care    Patient Medical history:  Allergies   Allergen Reactions    Gabapentin (Once-Daily)      Medication effect her mind ,     Morphine Other (See Comments)     Severe whole body shaking      Social History     Tobacco Use    Smoking status: Some Days     Current packs/day: 0.00     Types: Cigarettes     Last attempt to quit: 2024     Years since quittin.3    Smokeless tobacco: Never   Substance Use Topics    Alcohol use: Not Currently     Comment: Social    Drug use: Never         Additional ordered pre-operative testing:  []CBC    []ABG      [] BMP   []URINALYSIS   []CMP    []HCG   []COAGS PT/INR  []T&C  []LFTs   []TYPE AND SCREEN    [] EKG  [] Chest X-Ray  [] Other Radiology    [] Sent to Hospitalist None  [] Sent to Anesthesia for your review: None   [] Additional Orders: None     Comments:None   Requests: No Special requests    Signed: Carol Neil LPN 10/3/2024 9:09 AM

## 2024-10-16 ENCOUNTER — ANESTHESIA EVENT (OUTPATIENT)
Dept: OPERATING ROOM | Age: 44
End: 2024-10-16
Payer: COMMERCIAL

## 2024-10-16 ENCOUNTER — ANESTHESIA (OUTPATIENT)
Dept: OPERATING ROOM | Age: 44
End: 2024-10-16
Payer: COMMERCIAL

## 2024-10-16 ENCOUNTER — HOSPITAL ENCOUNTER (OUTPATIENT)
Age: 44
Setting detail: OUTPATIENT SURGERY
Discharge: HOME | End: 2024-10-16
Attending: UROLOGY | Admitting: UROLOGY
Payer: COMMERCIAL

## 2024-10-16 VITALS
OXYGEN SATURATION: 100 % | DIASTOLIC BLOOD PRESSURE: 55 MMHG | HEIGHT: 67 IN | SYSTOLIC BLOOD PRESSURE: 127 MMHG | BODY MASS INDEX: 36.79 KG/M2 | HEART RATE: 95 BPM | RESPIRATION RATE: 16 BRPM | WEIGHT: 234.4 LBS | TEMPERATURE: 97.7 F

## 2024-10-16 PROCEDURE — 3600000002 HC SURGERY LEVEL 2 BASE: Performed by: UROLOGY

## 2024-10-16 PROCEDURE — 6360000002 HC RX W HCPCS: Performed by: NURSE ANESTHETIST, CERTIFIED REGISTERED

## 2024-10-16 PROCEDURE — 2709999900 HC NON-CHARGEABLE SUPPLY: Performed by: UROLOGY

## 2024-10-16 PROCEDURE — 7100000010 HC PHASE II RECOVERY - FIRST 15 MIN: Performed by: UROLOGY

## 2024-10-16 PROCEDURE — 6360000002 HC RX W HCPCS: Performed by: UROLOGY

## 2024-10-16 PROCEDURE — 3700000001 HC ADD 15 MINUTES (ANESTHESIA): Performed by: UROLOGY

## 2024-10-16 PROCEDURE — 3600000012 HC SURGERY LEVEL 2 ADDTL 15MIN: Performed by: UROLOGY

## 2024-10-16 PROCEDURE — 3700000000 HC ANESTHESIA ATTENDED CARE: Performed by: UROLOGY

## 2024-10-16 PROCEDURE — 7100000011 HC PHASE II RECOVERY - ADDTL 15 MIN: Performed by: UROLOGY

## 2024-10-16 PROCEDURE — 2580000003 HC RX 258: Performed by: UROLOGY

## 2024-10-16 RX ORDER — SODIUM CHLORIDE 0.9 % (FLUSH) 0.9 %
5-40 SYRINGE (ML) INJECTION PRN
Status: DISCONTINUED | OUTPATIENT
Start: 2024-10-16 | End: 2024-10-16 | Stop reason: HOSPADM

## 2024-10-16 RX ORDER — SODIUM CHLORIDE, SODIUM LACTATE, POTASSIUM CHLORIDE, CALCIUM CHLORIDE 600; 310; 30; 20 MG/100ML; MG/100ML; MG/100ML; MG/100ML
INJECTION, SOLUTION INTRAVENOUS CONTINUOUS
Status: DISCONTINUED | OUTPATIENT
Start: 2024-10-16 | End: 2024-10-16 | Stop reason: HOSPADM

## 2024-10-16 RX ORDER — PROPOFOL 10 MG/ML
INJECTION, EMULSION INTRAVENOUS
Status: DISCONTINUED | OUTPATIENT
Start: 2024-10-16 | End: 2024-10-16 | Stop reason: SDUPTHER

## 2024-10-16 RX ORDER — SODIUM CHLORIDE 0.9 % (FLUSH) 0.9 %
5-40 SYRINGE (ML) INJECTION EVERY 12 HOURS SCHEDULED
Status: DISCONTINUED | OUTPATIENT
Start: 2024-10-16 | End: 2024-10-16 | Stop reason: HOSPADM

## 2024-10-16 RX ORDER — SODIUM CHLORIDE 9 MG/ML
INJECTION, SOLUTION INTRAVENOUS PRN
Status: DISCONTINUED | OUTPATIENT
Start: 2024-10-16 | End: 2024-10-16 | Stop reason: HOSPADM

## 2024-10-16 RX ADMIN — SODIUM CHLORIDE, PRESERVATIVE FREE 10 ML: 5 INJECTION INTRAVENOUS at 11:52

## 2024-10-16 RX ADMIN — PROPOFOL 150 MCG/KG/MIN: 10 INJECTION, EMULSION INTRAVENOUS at 12:27

## 2024-10-16 RX ADMIN — PROPOFOL 150 MG: 10 INJECTION, EMULSION INTRAVENOUS at 12:26

## 2024-10-16 RX ADMIN — Medication 2000 MG: at 12:26

## 2024-10-16 ASSESSMENT — PAIN SCALES - GENERAL: PAINLEVEL_OUTOF10: 0

## 2024-10-16 ASSESSMENT — PAIN - FUNCTIONAL ASSESSMENT
PAIN_FUNCTIONAL_ASSESSMENT: 0-10
PAIN_FUNCTIONAL_ASSESSMENT: NONE - DENIES PAIN
PAIN_FUNCTIONAL_ASSESSMENT: 0-10

## 2024-10-16 NOTE — ANESTHESIA POSTPROCEDURE EVALUATION
Department of Anesthesiology  Postprocedure Note    Patient: Evangelina King  MRN: [id number]  YOB: 1980  Date of evaluation: 10/16/2024    Procedure Summary       Date: 10/16/24 Room / Location: [address]    Anesthesia Start: 1225 Anesthesia Stop: 1240    Procedure: Cystoscopy Diagnosis:       Hematuria, unspecified type      (Hematuria, unspecified type [R31.9])    Surgeons: Enmanuel Crowell MD Responsible Provider: Deejay Jain II, APRN - CRNA    Anesthesia Type: General, TIVA ASA Status: 2            Anesthesia Type: General, TIVA    Analia Phase I: Analia Score: 10    Analia Phase II: Analia Score: 9    Anesthesia Post Evaluation    Patient location during evaluation: bedside  Patient participation: complete - patient participated  Level of consciousness: awake  Pain score: 1  Airway patency: patent  Nausea & Vomiting: no nausea and no vomiting  Cardiovascular status: hemodynamically stable  Respiratory status: spontaneous ventilation  Hydration status: stable  Pain management: satisfactory to patient        No notable events documented.

## 2024-10-16 NOTE — H&P
History and Physical    Patient:  Evangelina King  MRN: [id number]  YOB: 1980    CHIEF COMPLAINT:  Hematuria, unspecified type [R31.9]      HISTORY OF PRESENT ILLNESS:   The patient is a 44 y.o. female who presents with as above, here for surgery    Patient's old records, notes and chart reviewed and summarized above.     Past Medical History:    Past Medical History:   Diagnosis Date    Cancer (HCC)     14 years ago per patient    Cervical cancer (HCC)     Hydronephrosis     anna    Obstructive uropathy     Uterine mass        Past Surgical History:    Past Surgical History:   Procedure Laterality Date    CHOLECYSTECTOMY      CYSTOSCOPY N/A 05/20/2024    CYSTOSCOPY RETROGRADE PYELOGRAM, BLADDER BIOPSY performed by Ludin Barbosa MD at [facility] OR    CYSTOSCOPY W/ RETROGRADES  05/20/2024    CYSTOSCOPY RETROGRADE PYELOGRAM, BLADDER BIOPSY    DILATION AND CURETTAGE OF UTERUS  05/20/2024    DILATATION AND CURETTAGE HYSTEROSCOPY, VAGINAL / CERVIX. BIOPSY    DILATION AND CURETTAGE OF UTERUS N/A 05/20/2024    DILATATION AND CURETTAGE HYSTEROSCOPY, VAGINAL / CERVIX. BIOPSY performed by Jewell Clayton MD at [facility] OR    IR NEPHROSTOMY CATHETER PLACEMENT Bilateral 09/26/2024    IR Tube change    IR NEPHROSTOMY PERCUTANEOUS LEFT  05/21/2024    IR NEPHROSTOMY PERCUTANEOUS LEFT 5/21/2024 Daniel Gonsalez MD [facility] SPECIAL PROCEDURES    IR NEPHROSTOMY PERCUTANEOUS RIGHT  05/21/2024    IR NEPHROSTOMY PERCUTANEOUS RIGHT 5/21/2024 Daniel Gonsalez MD [facility] SPECIAL PROCEDURES    IR PORT PLACEMENT EQUAL OR GREATER THAN 5 YEARS  06/19/2024    IR PORT PLACEMENT EQUAL OR GREATER THAN 5 YEARS 6/19/2024 Ishan Butterfield MD [facility] SPECIAL PROCEDURES    US LYMPH NODE BIOPSY  06/19/2024    US LYMPH NODE BIOPSY 6/19/2024 [facility] ULTRASOUND     Medications Prior to Admission:    Prior to Admission medications    Medication Sig Start Date End Date Taking? Authorizing Provider   metoclopramide (REGLAN) 10 MG tablet Take 1

## 2024-10-16 NOTE — DISCHARGE INSTRUCTIONS
Call your doctor for the following:   Chills   Temperature greater than 101   Pain that is not tolerable despite taking pain medicine as ordered   Inability to urinate >12 hours/ or a non-draining lamas         No alcoholic beverages, no driving or operating machinery, no making important decisions for 24 hours.     Take your prescribed medications (if any) as instructed.    You may have a normal diet but should eat lightly on the day of surgery.    Drink plenty of fluids.    You may notice some burning or blood with urination, this is normal and should improve over next few days.    You may also take motrin/ibuprofen for pain control, you can alternate this with your other pain medications.    Be sure to take over the counter stool softeners if you notice constipation or hard stools.    Follow up with Dr. Crowell, office will arrange.       Post Operative UROLOGY Instructions:    It is very important to increase your fluid intake for the first few days - water is preferred.  You may experience slight discomfort with urination for 1-2 days following surgery.  Your urine may also be colored red.        Take medications as prescribed.  If you are experiencing only minor discomfort, you may take Tylenol or any non-prescription pain medication.    Activity  You have received sedation. Your judgement and coordination may be impaired.  -Do not drive or operate any machinery today.  -Do not make personal, medical, legal, or business decisions today.  -Do not consume alcohol, tranquilizers, sleeping or non-prescription medications today, unless indicated by your physician.  -You may resume normal activities after 24 hours and return to work unless otherwise stated by your doctor.    Diet  Unless otherwise instructed, begin with clear liquids and progress to your normal diet if you are not nauseated.    Medications  -resume your usual medications unless otherwise instructed.    Physician Follow up  -follow up with your

## 2024-10-16 NOTE — OP NOTE
Operative Note      Patient: Evangelina King  YOB: 1980  MRN: [id number]    Date of Procedure: 10/16/2024    Pre-Op Diagnosis Codes:      * Hematuria, unspecified type [R31.9]    Post-Op Diagnosis: Same       Procedure(s):  Cystoscopy    Surgeon(s):  Enmanuel Crowell MD    Assistant:   * No surgical staff found *    Anesthesia: Monitor Anesthesia Care    Estimated Blood Loss (mL): Minimal    Complications: None    Specimens:   * No specimens in log *    Implants:  * No implants in log *      Drains:   Nephrostomy Tube Left Flank 10 fr (Active)   Dressing Status New dressing applied;Clean, dry & intact 09/26/24 1346   Dressing Type Split gauze 09/26/24 1346   Collection Container Leg bag 09/26/24 1346   Status Patent 09/26/24 1346       Nephrostomy Tube Right Flank 10 fr (Active)   Dressing Status New dressing applied;Clean, dry & intact 09/26/24 1346   Dressing Type Split gauze 09/26/24 1346   Collection Container Leg bag 09/26/24 1346   Status Patent 09/26/24 1346       [REMOVED] Nephrostomy Tube Left Flank 10 fr (Removed)   Dressing Status Clean, dry & intact 09/26/24 1205   Dressing Type Split gauze 09/26/24 1205   Collection Container Standard 09/26/24 1205   Securement Method Other (Comment) 09/26/24 1205   Status Patent;Draining 09/26/24 1205   Urine Color Yellow 09/26/24 1205       [REMOVED] Nephrostomy Tube Right Flank 10 fr (Removed)   Dressing Status Clean, dry & intact 09/26/24 1205   Dressing Type Dry dressing;Split gauze 09/26/24 1205   Collection Container Standard 09/26/24 1205   Securement Method Other (Comment) 09/26/24 1205   Status Patent 09/26/24 1205   Urine Color Yellow 09/26/24 1205       Findings:  Infection Present At Time Of Surgery (PATOS) (choose all levels that have infection present):  No infection present  Other Findings: mild inflammatory changes within mucosa. No tumors noted. Small capcity bladder.     Detailed Description of Procedure:     Findings:   The patient was

## 2024-10-16 NOTE — ANESTHESIA PRE PROCEDURE
PROTIME 12.8 06/19/2024 09:13 AM    INR 1.0 06/19/2024 09:13 AM    APTT 34.2 06/19/2024 09:13 AM       HCG (If Applicable):   Lab Results   Component Value Date    PREGTESTUR NEGATIVE 10/03/2024    PREGSERUM NEGATIVE 06/11/2024        ABGs: No results found for: \"PHART\", \"PO2ART\", \"NKT8ZYO\", \"LTH9HMX\", \"BEART\", \"I1BKZXVJ\"     Type & Screen (If Applicable):  Lab Results   Component Value Date    ABORH A POSITIVE 05/21/2024    LABANTI NEGATIVE 05/21/2024       Drug/Infectious Status (If Applicable):  No results found for: \"HIV\", \"HEPCAB\"    COVID-19 Screening (If Applicable):   Lab Results   Component Value Date/Time    COVID19 Detected 12/13/2020 02:35 PM           Anesthesia Evaluation  Patient summary reviewed and Nursing notes reviewed  Airway: Mallampati: II  TM distance: >3 FB   Neck ROM: full  Mouth opening: > = 3 FB   Dental:      Comment: Multiple broken teeth throughout mouth     Pulmonary:Negative Pulmonary ROS and normal exam              Patient did not smoke on day of surgery.                 Cardiovascular:Negative CV ROS  Exercise tolerance: good (>4 METS)          Rhythm: regular  Rate: normal           Beta Blocker:  Not on Beta Blocker         Neuro/Psych:   (+) depression/anxiety             GI/Hepatic/Renal: Neg GI/Hepatic/Renal ROS            Endo/Other:    (+) malignancy/cancer (Uterine  & Cervical).                 Abdominal:             Vascular: negative vascular ROS.         Other Findings:        NPO > 0000           Anesthesia Plan      general and TIVA     ASA 2       Induction: intravenous.      Anesthetic plan and risks discussed with patient.    Use of blood products discussed with patient whom consented to blood products.    Plan discussed with surgical team.                    Abdon Augustin, APRN - CRNA   10/16/2024

## 2024-10-17 ENCOUNTER — CARE COORDINATION (OUTPATIENT)
Dept: CARE COORDINATION | Age: 44
End: 2024-10-17

## 2024-10-17 NOTE — CARE COORDINATION
Ambulatory Care Coordination Note     10/17/2024 10:42 AM     Patient Current Location:  Home: [address] [place], [place] [address] [place] [address]     ACM contacted the patient by telephone.        ACM: Katerine Vogel RN     Care Summary Note:        She has:          Cervical squamous cell carcinoma, anna nephrostomy tubes in place- on medications follows with radiation oncology, oncology,  urology and PCP     Interim  care- nursing, PT/OT     Plan of Care : Continue assessments, education and support                          RPM- N/A                          F/U oncology, nephrostomy tubes,  care    Spoke with Evangelina. She had cysto yesterday. No biopsies needed to be taken. She is scheduled for F/U with oncology to determine further treatment- deciding on third chemo treatment.   She is on new nausea medication that is working well.   She continues with nephrostomy tubes- she is able to urinate. She is awaiting Dr. Santana's office to schedule F/U.    care nurse continues to follow.     Assessments Completed:   General Assessment    Do you have any symptoms that are causing concern?: No          Medications Reviewed:   Patient denies any changes with medications and reports taking all medications as prescribed.    Advance Care Planning:   Reviewed during previous call      Care Planning:    Goals Addressed                      This Visit's Progress       Patient Stated      Patient Stated (pt-stated)   On track      She will keep scheduled appointments, reach out if with concerns.     Barriers: lack of support and lack of education  Plan for overcoming my barriers: Interim  care and Care Coordination Intervention  Confidence: 10/10  Anticipated Goal Completion Date: 11/28/2024               PCP/Specialist follow up:   Future Appointments         Provider Specialty Dept Phone    10/22/2024 11:45 AM Tera Lomeli MD Oncology [phone]    10/24/2024  8:00 AM MD MED ONC CHAIR 06 Infusion Therapy [phone]    10/29/2024

## 2024-10-22 ENCOUNTER — OFFICE VISIT (OUTPATIENT)
Dept: ONCOLOGY | Age: 44
End: 2024-10-22
Payer: COMMERCIAL

## 2024-10-22 VITALS
HEART RATE: 103 BPM | RESPIRATION RATE: 20 BRPM | WEIGHT: 237 LBS | SYSTOLIC BLOOD PRESSURE: 124 MMHG | TEMPERATURE: 97.2 F | OXYGEN SATURATION: 98 % | DIASTOLIC BLOOD PRESSURE: 86 MMHG | BODY MASS INDEX: 37.2 KG/M2 | HEIGHT: 67 IN

## 2024-10-22 DIAGNOSIS — R11.0 CHEMOTHERAPY-INDUCED NAUSEA: ICD-10-CM

## 2024-10-22 DIAGNOSIS — Z51.11 CHEMOTHERAPY MANAGEMENT, ENCOUNTER FOR: ICD-10-CM

## 2024-10-22 DIAGNOSIS — E61.1 IRON DEFICIENCY: ICD-10-CM

## 2024-10-22 DIAGNOSIS — C53.9 PRIMARY CERVICAL SQUAMOUS CELL CARCINOMA (HCC): Primary | ICD-10-CM

## 2024-10-22 DIAGNOSIS — T45.1X5A CHEMOTHERAPY-INDUCED NAUSEA: ICD-10-CM

## 2024-10-22 PROCEDURE — 99215 OFFICE O/P EST HI 40 MIN: CPT | Performed by: INTERNAL MEDICINE

## 2024-10-22 RX ORDER — ONDANSETRON 2 MG/ML
8 INJECTION INTRAMUSCULAR; INTRAVENOUS
OUTPATIENT
Start: 2024-10-24

## 2024-10-22 RX ORDER — ALBUTEROL SULFATE 90 UG/1
4 INHALANT RESPIRATORY (INHALATION) PRN
OUTPATIENT
Start: 2024-10-24

## 2024-10-22 RX ORDER — SODIUM CHLORIDE 9 MG/ML
INJECTION, SOLUTION INTRAVENOUS CONTINUOUS
OUTPATIENT
Start: 2024-10-24

## 2024-10-22 RX ORDER — PROCHLORPERAZINE EDISYLATE 5 MG/ML
5 INJECTION INTRAMUSCULAR; INTRAVENOUS
OUTPATIENT
Start: 2024-10-24

## 2024-10-22 RX ORDER — DIPHENHYDRAMINE HYDROCHLORIDE 50 MG/ML
50 INJECTION INTRAMUSCULAR; INTRAVENOUS ONCE
Status: CANCELLED | OUTPATIENT
Start: 2024-10-24 | End: 2024-10-24

## 2024-10-22 RX ORDER — PROCHLORPERAZINE MALEATE 10 MG
10 TABLET ORAL EVERY 8 HOURS PRN
Qty: 40 TABLET | Refills: 1 | Status: SHIPPED | OUTPATIENT
Start: 2024-10-22

## 2024-10-22 RX ORDER — MEPERIDINE HYDROCHLORIDE 50 MG/ML
12.5 INJECTION INTRAMUSCULAR; INTRAVENOUS; SUBCUTANEOUS PRN
OUTPATIENT
Start: 2024-10-24

## 2024-10-22 RX ORDER — ACETAMINOPHEN 325 MG/1
650 TABLET ORAL
OUTPATIENT
Start: 2024-10-24

## 2024-10-22 RX ORDER — HEPARIN SODIUM (PORCINE) LOCK FLUSH IV SOLN 100 UNIT/ML 100 UNIT/ML
500 SOLUTION INTRAVENOUS PRN
Status: CANCELLED | OUTPATIENT
Start: 2024-10-24

## 2024-10-22 RX ORDER — EPINEPHRINE 1 MG/ML
0.3 INJECTION, SOLUTION, CONCENTRATE INTRAVENOUS PRN
OUTPATIENT
Start: 2024-10-24

## 2024-10-22 RX ORDER — DIPHENHYDRAMINE HYDROCHLORIDE 50 MG/ML
50 INJECTION INTRAMUSCULAR; INTRAVENOUS
OUTPATIENT
Start: 2024-10-24

## 2024-10-22 RX ORDER — PALONOSETRON 0.05 MG/ML
0.25 INJECTION, SOLUTION INTRAVENOUS ONCE
Status: CANCELLED | OUTPATIENT
Start: 2024-10-24 | End: 2024-10-24

## 2024-10-22 RX ORDER — SODIUM CHLORIDE 0.9 % (FLUSH) 0.9 %
5-40 SYRINGE (ML) INJECTION PRN
Status: CANCELLED | OUTPATIENT
Start: 2024-10-24

## 2024-10-22 RX ORDER — FAMOTIDINE 10 MG/ML
20 INJECTION, SOLUTION INTRAVENOUS ONCE
Status: CANCELLED | OUTPATIENT
Start: 2024-10-24 | End: 2024-10-24

## 2024-10-22 RX ORDER — FAMOTIDINE 10 MG/ML
20 INJECTION, SOLUTION INTRAVENOUS
OUTPATIENT
Start: 2024-10-24

## 2024-10-22 RX ORDER — SODIUM CHLORIDE 9 MG/ML
5-250 INJECTION, SOLUTION INTRAVENOUS PRN
OUTPATIENT
Start: 2024-10-24

## 2024-10-22 RX ORDER — SODIUM CHLORIDE 9 MG/ML
5-250 INJECTION, SOLUTION INTRAVENOUS PRN
Status: CANCELLED | OUTPATIENT
Start: 2024-10-24

## 2024-10-22 NOTE — PROGRESS NOTES
Evangelina King                                                                                                                  10/22/2024  MRN:   [id number]  YOB: 1980  PCP:                           Miranda Sofia MD  Referring Physician: No ref. provider found  Treating Physician Name: HUGO FRANCOIS MD      Reason for visit:  Chief Complaint   Patient presents with    endometrial Cancer     3 week follow up   Toxicity check.  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  She presented to an [facility] for evaluation of abnormal uterine bleeding for 2 years with associated foul-smelling, black vaginal discharge for 6 weeks and associated urinary frequency.  She was also noted to have fevers at this time.  Imaging at [facility] showed a thickened endometrium and hydronephrosis.  She was transferred to [place] for GYN evaluation for concern for malignancy with thickened endometrium and hydronephrosis.     She had an EUA, vaginoscopy, endometrial/cervical biopsies, cystoscopy, bladder biopsy on 5/20/2024  IR has been consulted for bilateral PERC neph tubes  case discussed with GYN-ONC , they suspect endometrial versus cervical cancer. Not a surgical candidate   Pt just back from [facility], she is in a lot of pain, hard to get much information     Interim History:  Patient presents to the clinic for a follow-up visit and to discuss treatment plan.  Underwent cystoscopy which was unremarkable.  Continues

## 2024-10-24 ENCOUNTER — HOSPITAL ENCOUNTER (OUTPATIENT)
Dept: INFUSION THERAPY | Age: 44
Discharge: HOME | End: 2024-10-24
Payer: COMMERCIAL

## 2024-10-24 VITALS
WEIGHT: 236.2 LBS | OXYGEN SATURATION: 100 % | SYSTOLIC BLOOD PRESSURE: 121 MMHG | TEMPERATURE: 97.6 F | DIASTOLIC BLOOD PRESSURE: 79 MMHG | HEIGHT: 67 IN | RESPIRATION RATE: 18 BRPM | HEART RATE: 102 BPM | BODY MASS INDEX: 37.07 KG/M2

## 2024-10-24 DIAGNOSIS — C53.9 PRIMARY CERVICAL SQUAMOUS CELL CARCINOMA (HCC): Primary | ICD-10-CM

## 2024-10-24 DIAGNOSIS — C53.8 MALIGNANT NEOPLASM OF OVERLAPPING SITES OF CERVIX (HCC): ICD-10-CM

## 2024-10-24 LAB
ALBUMIN SERPL-MCNC: 3.9 G/DL (ref 3.5–5.2)
ALBUMIN/GLOB SERPL: 1.1 {RATIO} (ref 1–2.5)
ALP SERPL-CCNC: 131 U/L (ref 35–104)
ALT SERPL-CCNC: 6 U/L (ref 5–33)
ANION GAP SERPL CALCULATED.3IONS-SCNC: 12 MMOL/L (ref 9–17)
AST SERPL-CCNC: 12 U/L
BASOPHILS # BLD: 0.03 K/UL (ref 0–0.2)
BASOPHILS NFR BLD: 0 % (ref 0–2)
BILIRUB SERPL-MCNC: 0.3 MG/DL (ref 0.3–1.2)
BUN SERPL-MCNC: 14 MG/DL (ref 6–20)
BUN/CREAT SERPL: 16 (ref 9–20)
CALCIUM SERPL-MCNC: 9.6 MG/DL (ref 8.6–10.4)
CHLORIDE SERPL-SCNC: 100 MMOL/L (ref 98–107)
CO2 SERPL-SCNC: 25 MMOL/L (ref 20–31)
CREAT SERPL-MCNC: 0.9 MG/DL (ref 0.5–0.9)
EOSINOPHIL # BLD: 0.05 K/UL (ref 0–0.44)
EOSINOPHILS RELATIVE PERCENT: 1 % (ref 1–4)
ERYTHROCYTE [DISTWIDTH] IN BLOOD BY AUTOMATED COUNT: 23.2 % (ref 11.8–14.4)
GFR, ESTIMATED: 80 ML/MIN/1.73M2
GLUCOSE SERPL-MCNC: 108 MG/DL (ref 70–99)
HCG UR QL: NEGATIVE
HCT VFR BLD AUTO: 24.9 % (ref 36.3–47.1)
HGB BLD-MCNC: 8.3 G/DL (ref 11.9–15.1)
IMM GRANULOCYTES # BLD AUTO: 0.03 K/UL (ref 0–0.3)
IMM GRANULOCYTES NFR BLD: 0 %
LYMPHOCYTES NFR BLD: 1.04 K/UL (ref 1.1–3.7)
LYMPHOCYTES RELATIVE PERCENT: 15 % (ref 24–43)
MCH RBC QN AUTO: 31.9 PG (ref 25.2–33.5)
MCHC RBC AUTO-ENTMCNC: 33.3 G/DL (ref 25.2–33.5)
MCV RBC AUTO: 95.8 FL (ref 82.6–102.9)
MONOCYTES NFR BLD: 0.65 K/UL (ref 0.1–1.2)
MONOCYTES NFR BLD: 9 % (ref 3–12)
NEUTROPHILS NFR BLD: 75 % (ref 36–65)
NEUTS SEG NFR BLD: 5.3 K/UL (ref 1.5–8.1)
NRBC BLD-RTO: 0 PER 100 WBC
PLATELET # BLD AUTO: 117 K/UL (ref 138–453)
PMV BLD AUTO: 9 FL (ref 8.1–13.5)
POTASSIUM SERPL-SCNC: 4.3 MMOL/L (ref 3.7–5.3)
PROT SERPL-MCNC: 7.4 G/DL (ref 6.4–8.3)
RBC # BLD AUTO: 2.6 M/UL (ref 3.95–5.11)
SODIUM SERPL-SCNC: 137 MMOL/L (ref 135–144)
WBC OTHER # BLD: 7.1 K/UL (ref 3.5–11.3)

## 2024-10-24 PROCEDURE — 80053 COMPREHEN METABOLIC PANEL: CPT

## 2024-10-24 PROCEDURE — 85025 COMPLETE CBC W/AUTO DIFF WBC: CPT

## 2024-10-24 PROCEDURE — 96413 CHEMO IV INFUSION 1 HR: CPT

## 2024-10-24 PROCEDURE — 6360000002 HC RX W HCPCS: Performed by: INTERNAL MEDICINE

## 2024-10-24 PROCEDURE — 81025 URINE PREGNANCY TEST: CPT

## 2024-10-24 PROCEDURE — 96415 CHEMO IV INFUSION ADDL HR: CPT

## 2024-10-24 PROCEDURE — 96417 CHEMO IV INFUS EACH ADDL SEQ: CPT

## 2024-10-24 PROCEDURE — 2500000003 HC RX 250 WO HCPCS: Performed by: INTERNAL MEDICINE

## 2024-10-24 PROCEDURE — 2580000003 HC RX 258: Performed by: INTERNAL MEDICINE

## 2024-10-24 PROCEDURE — 96367 TX/PROPH/DG ADDL SEQ IV INF: CPT

## 2024-10-24 PROCEDURE — 96375 TX/PRO/DX INJ NEW DRUG ADDON: CPT

## 2024-10-24 PROCEDURE — 36591 DRAW BLOOD OFF VENOUS DEVICE: CPT

## 2024-10-24 RX ORDER — HEPARIN 100 UNIT/ML
500 SYRINGE INTRAVENOUS PRN
Status: DISCONTINUED | OUTPATIENT
Start: 2024-10-24 | End: 2024-10-25 | Stop reason: HOSPADM

## 2024-10-24 RX ORDER — DEXAMETHASONE SODIUM PHOSPHATE 10 MG/ML
10 INJECTION, SOLUTION INTRAMUSCULAR; INTRAVENOUS ONCE
Status: COMPLETED | OUTPATIENT
Start: 2024-10-24 | End: 2024-10-24

## 2024-10-24 RX ORDER — SODIUM CHLORIDE 9 MG/ML
5-250 INJECTION, SOLUTION INTRAVENOUS PRN
Status: DISCONTINUED | OUTPATIENT
Start: 2024-10-24 | End: 2024-10-25 | Stop reason: HOSPADM

## 2024-10-24 RX ORDER — DIPHENHYDRAMINE HYDROCHLORIDE 50 MG/ML
50 INJECTION INTRAMUSCULAR; INTRAVENOUS ONCE
Status: COMPLETED | OUTPATIENT
Start: 2024-10-24 | End: 2024-10-24

## 2024-10-24 RX ORDER — PALONOSETRON 0.05 MG/ML
0.25 INJECTION, SOLUTION INTRAVENOUS ONCE
Status: COMPLETED | OUTPATIENT
Start: 2024-10-24 | End: 2024-10-24

## 2024-10-24 RX ORDER — SODIUM CHLORIDE 0.9 % (FLUSH) 0.9 %
5-40 SYRINGE (ML) INJECTION PRN
Status: DISCONTINUED | OUTPATIENT
Start: 2024-10-24 | End: 2024-10-25 | Stop reason: HOSPADM

## 2024-10-24 RX ADMIN — SODIUM CHLORIDE, PRESERVATIVE FREE 20 MG: 5 INJECTION INTRAVENOUS at 08:52

## 2024-10-24 RX ADMIN — PACLITAXEL 396 MG: 6 INJECTION, SOLUTION INTRAVENOUS at 09:37

## 2024-10-24 RX ADMIN — CARBOPLATIN 620 MG: 10 INJECTION INTRAVENOUS at 12:36

## 2024-10-24 RX ADMIN — PALONOSETRON 0.25 MG: 0.05 INJECTION, SOLUTION INTRAVENOUS at 08:52

## 2024-10-24 RX ADMIN — SODIUM CHLORIDE 20 ML/HR: 9 INJECTION, SOLUTION INTRAVENOUS at 08:37

## 2024-10-24 RX ADMIN — SODIUM CHLORIDE 150 MG: 9 INJECTION, SOLUTION INTRAVENOUS at 09:07

## 2024-10-24 RX ADMIN — DIPHENHYDRAMINE HYDROCHLORIDE 50 MG: 50 INJECTION INTRAMUSCULAR; INTRAVENOUS at 08:52

## 2024-10-24 RX ADMIN — DEXAMETHASONE SODIUM PHOSPHATE 10 MG: 10 INJECTION, SOLUTION INTRAMUSCULAR; INTRAVENOUS at 08:52

## 2024-10-24 NOTE — FLOWSHEET NOTE
Spiritual Health History and Assessment/Progress Note  [facility]    Spiritual/Emotional Needs,  ,  ,      Name: Evangelina King MRN: [id number]    Age: 44 y.o.     Sex: female   Language: English   Moravian: None   <principal problem not specified>     Date: 10/24/2024            Total Time Calculated: 97 min              Spiritual Assessment continued in [facility]  MED ONC        Referral/Consult From: Rounding   Encounter Overview/Reason: Spiritual/Emotional Needs  Service Provided For: Patient and family together    Patient reported feeling well and showing unexpectedly positive progress with her treatments, but shared about her frustration concerning her insurance denying further treatments and her intent to appeal and seek additional help.  Patient was accompanied by her mom and her mother-in-law, has an extensive support system of family and friends, and is looking forward to her upcoming vacation.    Johanne, Belief, Meaning:   Patient identifies as spiritual and has beliefs or practices that help with coping during difficult times  Family/Friends identify as spiritual and have beliefs or practices that help with coping during difficult times      Importance and Influence:  Patient has spiritual/personal beliefs that influence decisions regarding their health  Family/Friends have spiritual/personal beliefs that influence decisions regarding the patient's health    Community:  Patient feels well-supported.  Support system includes: Spouse, Parents, Children, Friends, and Extended Family.  Family/Friends feel well-supported. Support system includes: Spouse/Partner, Children, Friends, and Extended family    Assessment and Plan of Care:     Patient Interventions include: Facilitated expression of thoughts and feelings, Affirmed coping skills/support systems, and Provided sacramental/Mormonism ritual  Family/Friends Interventions include: Facilitated expression of thoughts and feelings and Affirmed coping  skills/support systems    Patient Plan of Care: Spiritual Care available upon further referral  Family/Friends Plan of Care: Spiritual Care available upon further referral    Electronically signed by Chaplain Hugo on 10/24/2024 at 3:32 PM

## 2024-10-29 ENCOUNTER — TELEPHONE (OUTPATIENT)
Dept: ONCOLOGY | Age: 44
End: 2024-10-29

## 2024-10-29 ENCOUNTER — HOSPITAL ENCOUNTER (OUTPATIENT)
Dept: RADIATION ONCOLOGY | Age: 44
Discharge: HOME | End: 2024-10-29
Payer: COMMERCIAL

## 2024-10-29 VITALS
HEART RATE: 118 BPM | DIASTOLIC BLOOD PRESSURE: 87 MMHG | BODY MASS INDEX: 37.49 KG/M2 | TEMPERATURE: 97.1 F | RESPIRATION RATE: 16 BRPM | SYSTOLIC BLOOD PRESSURE: 136 MMHG | WEIGHT: 239.4 LBS

## 2024-10-29 PROCEDURE — 99212 OFFICE O/P EST SF 10 MIN: CPT | Performed by: RADIOLOGY

## 2024-10-29 NOTE — PROGRESS NOTES
Evangelina King  10/29/2024  2:11 PM      Vitals:    10/29/24 1400   BP: 136/87   Pulse: (!) 118   Resp: 16   Temp: 97.1 °F (36.2 °C)    :                Wt Readings from Last 1 Encounters:   10/29/24 108.6 kg (239 lb 6.4 oz)                Current Outpatient Medications:     prochlorperazine (COMPAZINE) 10 MG tablet, Take 1 tablet by mouth every 8 hours as needed (nausea), Disp: 40 tablet, Rfl: 1    metoclopramide (REGLAN) 10 MG tablet, Take 1 tablet by mouth 3 times daily (with meals), Disp: 45 tablet, Rfl: 0    Psyllium (METAMUCIL PO), Take by mouth, Disp: , Rfl:     gabapentin (NEURONTIN) 300 MG capsule, Take 1 capsule by mouth nightly as needed (pain) for up to 180 days., Disp: 90 capsule, Rfl: 1    magnesium hydroxide (MILK OF MAGNESIA) 400 MG/5ML suspension, Take 30 mLs by mouth daily as needed for Constipation, Disp: 354 mL, Rfl: 1    sertraline (ZOLOFT) 50 MG tablet, Take 1 tablet by mouth daily, Disp: 90 tablet, Rfl: 1    acetaminophen (TYLENOL) 500 MG tablet, Take 2 tablets by mouth every 6 hours as needed for Pain, Disp: 60 tablet, Rfl: 0                     PLAN: Patient is seen today in follow up.  She has bilateral nephrostomy tubes and is also urinating on her own.  She denies vaginal bleeding.  She has bone pain after chemotherapy.  She continues on systemic treatments and follows with Gyn Onc as well.  She is anticipating a PET scan in December next.  Dr. Vinson reviewing recent CT scans with patient and states pt can follow up here as needed.           Karina Hearn RN

## 2024-10-29 NOTE — TELEPHONE ENCOUNTER
Name: Evangelina King  : 1980  MRN: [id number]    Oncology Navigation Follow-Up Note    Contact Type:  Radiation Oncology    Notes: Pt @ [facility] for Dr. Vinson f/u.  Met w/pt & pt's mother in RO exam room, Dr. Vinson present.  Pt denied questions/concerns for writer.  Encouraged to contact writer prn.  Will continue to follow.      Electronically signed by Jessa Skaggs RN on 10/29/2024 at 2:38 PM

## 2024-10-31 ENCOUNTER — CARE COORDINATION (OUTPATIENT)
Dept: CARE COORDINATION | Age: 44
End: 2024-10-31

## 2024-10-31 RX ORDER — FLUCONAZOLE 150 MG/1
150 TABLET ORAL ONCE
Qty: 1 TABLET | Refills: 0 | Status: SHIPPED | OUTPATIENT
Start: 2024-10-31 | End: 2024-10-31

## 2024-10-31 NOTE — PROGRESS NOTES
[facility]            Radiation Oncology          [address] [place] [address]        O: [phone]        F: [phone]       [email or web address]           Date of Service: 10/29/2024     Location:  [facility],   [address] [place] [id number]   [phone]       RADIATION ONCOLOGY FOLLOW UP NOTE    Patient ID:   Evangelina King  : 1980   MRN: [id number]    DIAGNOSIS:  Cancer Staging   Primary cervical squamous cell carcinoma (HCC)  Staging form: Cervix Uteri, AJCC Version 9  - Clinical stage from 2024: FIGO Stage SLIME (cT4, cN2a, cM0) - Signed by Jeremy Vinson MD on 2024    -s/p Pelvic RT 30Gy 24  -on carbo/taxol x5    INTERVAL HISTORY:   Evangelina King is a 44 y.o.. female with a history of abnormal menstrual bleeding for 2 years and recent complaint of flank pain presented to the ED for evaluation. Patient had gone to urgent care thinking that she may have had a kidney stone and was referred to her local ER. Patient had a CT of the abdomen pelvis on May 17, 2024 which showed enlarged uterus with a thickened endometrium measuring 3.9 cm in thickness. There is presence of gas and possible hydronephrosis extending up bilaterally. Patient did have some pelvic adenopathy and retroperitoneal adenopathy present. Patient had a cystoscopy which showed bladder trigone involvement. Patient had bilateral nephrostomy tubes placed while in the hospital. She had an MRI of the pelvis on May 18, 2024 which showed suspicious FIGO stage Slime endometrial cancer with myometrial cervical stromal and bladder mucosal invasion with numerous iliac obturator mesorectal lymph nodes. There isPatient was recommended to have a pelvic ultrasound and had D&C with biopsy on May 20, 2024 which confirmed a invasive poorly differentiated squamous cell carcinoma p16 positive involving the cervix as well as the urinary bladder.  Patient

## 2024-10-31 NOTE — CARE COORDINATION
Ambulatory Care Coordination Note     10/31/2024 10:27 AM     Patient outreach attempt by this AC today to perform care management follow up . ACM was unable to reach the patient by telephone today; left voice message requesting a return phone call to this ACM.     ACM: Katerine Vogel, RN     Care Summary Note:     She has:          Cervical squamous cell carcinoma, anna nephrostomy tubes in place- on medications follows with radiation oncology, oncology,  urology and PCP     Interim  care- nursing, PT/OT     Plan of Care : Continue assessments, education and support                          RPM- N/A                          F/U oncology, nephrostomy tubes,  care     10/31/2024- 10:28 am  Left HIPAA compliant voice message requesting return call @ [phone] re: ACM F/U call.       PCP/Specialist follow up:   Future Appointments         Provider Specialty Dept Phone    11/12/2024 12:45 PM Tera Lomeli MD Oncology [phone]    11/14/2024 8:00 AM [facility] MED ONC CHAIR 06 Infusion Therapy [phone]    11/21/2024 12:30 PM Radiologist, Stv Interventional; STV INTERVENT RN POOL; STV INTERVENT RM 1 Radiology Special Procedures [phone]    12/3/2024 11:00 AM Madison Valerio PA-C Gynecologic Oncology [phone]    12/10/2024 2:40 PM Miranda Sofia MD Internal Medicine [phone]    1/15/2025 9:20 AM Enmanuel Crowell MD Urology [phone]            Follow Up:   Plan for next ACM outreach in approximately 1 week to complete:  - goal progression.

## 2024-11-01 ENCOUNTER — OFFICE VISIT (OUTPATIENT)
Dept: PRIMARY CARE CLINIC | Age: 44
End: 2024-11-01

## 2024-11-01 VITALS
WEIGHT: 242 LBS | HEIGHT: 67 IN | BODY MASS INDEX: 37.98 KG/M2 | DIASTOLIC BLOOD PRESSURE: 68 MMHG | SYSTOLIC BLOOD PRESSURE: 124 MMHG

## 2024-11-01 DIAGNOSIS — N89.8 VAGINAL IRRITATION: Primary | ICD-10-CM

## 2024-11-01 RX ORDER — FLUCONAZOLE 150 MG/1
150 TABLET ORAL ONCE
Qty: 1 TABLET | Refills: 0 | Status: SHIPPED | OUTPATIENT
Start: 2024-11-01 | End: 2024-11-01

## 2024-11-01 RX ORDER — TRIAMCINOLONE ACETONIDE 1 MG/G
CREAM TOPICAL
Qty: 80 G | Refills: 3 | Status: SHIPPED | OUTPATIENT
Start: 2024-11-01

## 2024-11-01 RX ORDER — MUPIROCIN 20 MG/G
OINTMENT TOPICAL
Qty: 1 EACH | Refills: 3 | Status: SHIPPED | OUTPATIENT
Start: 2024-11-01

## 2024-11-01 RX ORDER — NYSTATIN 100000 [USP'U]/G
POWDER TOPICAL
Qty: 30 G | Refills: 2 | Status: SHIPPED | OUTPATIENT
Start: 2024-11-01

## 2024-11-01 RX ORDER — TRIAMCINOLONE ACETONIDE 0.25 MG/G
OINTMENT TOPICAL
Qty: 15 G | Refills: 3 | Status: SHIPPED | OUTPATIENT
Start: 2024-11-01 | End: 2024-11-01

## 2024-11-01 ASSESSMENT — PATIENT HEALTH QUESTIONNAIRE - PHQ9: DEPRESSION UNABLE TO ASSESS: PT REFUSES

## 2024-11-01 NOTE — PATIENT INSTRUCTIONS
Apply steroid cream ( Triamolone) 3 times a day, apply thin layer   Wait half hour to hour and apply mupirocin cream 3 times a day and after voiding  May apply powder to vaginal area for itching after using cream and waiting 1 hour  Increase fluids  Leave area open to air as much as possible  Wear cotton underwear  Call gynecology and schedule an appointment  If symptoms worsen follow up PCP  Patient verbalized understanding and agrees with plan of care

## 2024-11-01 NOTE — PROGRESS NOTES
performed by Jewell Clayton MD at [facility] OR    IR NEPHROSTOMY CATHETER PLACEMENT Bilateral 2024    IR Tube change    IR NEPHROSTOMY PERCUTANEOUS LEFT  2024    IR NEPHROSTOMY PERCUTANEOUS LEFT 2024 Daniel Gonsalez MD [facility] SPECIAL PROCEDURES    IR NEPHROSTOMY PERCUTANEOUS RIGHT  2024    IR NEPHROSTOMY PERCUTANEOUS RIGHT 2024 Daniel Gonsalez MD [facility] SPECIAL PROCEDURES    IR PORT PLACEMENT EQUAL OR GREATER THAN 5 YEARS  2024    IR PORT PLACEMENT EQUAL OR GREATER THAN 5 YEARS 2024 Ishan Butterfield MD [facility] SPECIAL PROCEDURES    US LYMPH NODE BIOPSY  2024    US LYMPH NODE BIOPSY 2024 [facility] ULTRASOUND       Family History   Adopted: Yes   Problem Relation Age of Onset    Lung Cancer Maternal Grandfather     Breast Cancer Other     Cancer Maternal Uncle        Social History     Tobacco Use    Smoking status: Some Days     Current packs/day: 0.00     Types: Cigarettes     Last attempt to quit: 2024     Years since quittin.4    Smokeless tobacco: Never   Substance Use Topics    Alcohol use: Not Currently     Comment: Social      Prior to Visit Medications    Medication Sig Taking? Authorizing Provider   prochlorperazine (COMPAZINE) 10 MG tablet Take 1 tablet by mouth every 8 hours as needed (nausea)  Tera Lomeli MD   metoclopramide (REGLAN) 10 MG tablet Take 1 tablet by mouth 3 times daily (with meals)  Patient not taking: Reported on 10/29/2024  Miranda Sofia MD   Psyllium (METAMUCIL PO) Take by mouth  ProviderSwathi MD   gabapentin (NEURONTIN) 300 MG capsule Take 1 capsule by mouth nightly as needed (pain) for up to 180 days.  Miranda Sofia MD   magnesium hydroxide (MILK OF MAGNESIA) 400 MG/5ML suspension Take 30 mLs by mouth daily as needed for Constipation  Tera Lomeli MD   sertraline (ZOLOFT) 50 MG tablet Take 1 tablet by mouth daily  Miarnda Sofia MD   acetaminophen (TYLENOL) 500 MG tablet Take 2 tablets by mouth every 6 hours as needed

## 2024-11-04 ENCOUNTER — TELEPHONE (OUTPATIENT)
Dept: GYNECOLOGIC ONCOLOGY | Age: 44
End: 2024-11-04

## 2024-11-04 ENCOUNTER — HOSPITAL ENCOUNTER (OUTPATIENT)
Age: 44
Setting detail: SPECIMEN
Discharge: HOME | End: 2024-11-04

## 2024-11-04 ENCOUNTER — OFFICE VISIT (OUTPATIENT)
Dept: GYNECOLOGIC ONCOLOGY | Age: 44
End: 2024-11-04
Payer: COMMERCIAL

## 2024-11-04 VITALS
OXYGEN SATURATION: 98 % | SYSTOLIC BLOOD PRESSURE: 112 MMHG | DIASTOLIC BLOOD PRESSURE: 74 MMHG | WEIGHT: 241 LBS | HEART RATE: 119 BPM | TEMPERATURE: 97.6 F | BODY MASS INDEX: 37.75 KG/M2

## 2024-11-04 DIAGNOSIS — N90.89 VULVAR LESION: ICD-10-CM

## 2024-11-04 DIAGNOSIS — C53.9 PRIMARY CERVICAL SQUAMOUS CELL CARCINOMA (HCC): Primary | ICD-10-CM

## 2024-11-04 DIAGNOSIS — K12.30 MUCOSITIS: ICD-10-CM

## 2024-11-04 PROCEDURE — 99214 OFFICE O/P EST MOD 30 MIN: CPT | Performed by: PHYSICIAN ASSISTANT

## 2024-11-04 RX ORDER — SILVER SULFADIAZINE 10 MG/G
CREAM TOPICAL
Qty: 400 G | Refills: 0 | Status: SHIPPED | OUTPATIENT
Start: 2024-11-04

## 2024-11-04 RX ORDER — VALACYCLOVIR HYDROCHLORIDE 1 G/1
1000 TABLET, FILM COATED ORAL 2 TIMES DAILY
Qty: 20 TABLET | Refills: 0 | Status: SHIPPED | OUTPATIENT
Start: 2024-11-04 | End: 2024-11-14

## 2024-11-04 ASSESSMENT — ENCOUNTER SYMPTOMS
WHEEZING: 0
ABDOMINAL PAIN: 0
ABDOMINAL DISTENTION: 0
HEMOPTYSIS: 0
TROUBLE SWALLOWING: 0
SHORTNESS OF BREATH: 0
DIARRHEA: 0
CHEST TIGHTNESS: 0
COUGH: 0
EYE PROBLEMS: 0
SCLERAL ICTERUS: 0
SORE THROAT: 0
NAUSEA: 0
VOICE CHANGE: 0
CONSTIPATION: 0
BACK PAIN: 0
RECTAL PAIN: 0
BLOOD IN STOOL: 0
VOMITING: 0

## 2024-11-04 NOTE — PROGRESS NOTES
Review of Systems   Constitutional:  Negative for appetite change, chills, diaphoresis, fatigue, fever and unexpected weight change.   HENT:   Negative for hearing loss, lump/mass, mouth sores, nosebleeds, sore throat, tinnitus, trouble swallowing and voice change.    Eyes:  Negative for eye problems and icterus.   Respiratory:  Negative for chest tightness, cough, hemoptysis, shortness of breath and wheezing.    Cardiovascular:  Negative for chest pain, leg swelling and palpitations.   Gastrointestinal:  Negative for abdominal distention, abdominal pain, blood in stool, constipation, diarrhea, nausea, rectal pain and vomiting.   Endocrine: Negative for hot flashes.   Genitourinary:  Negative for bladder incontinence, difficulty urinating, dyspareunia, dysuria, frequency, hematuria, menstrual problem, nocturia, pelvic pain, vaginal bleeding and vaginal discharge.         Lesions burn upon urination   Musculoskeletal:  Negative for arthralgias, back pain, flank pain, gait problem, myalgias, neck pain and neck stiffness.   Skin:  Positive for itching, rash and wound.        To vulva   Neurological:  Positive for headaches (treatment related). Negative for dizziness, extremity weakness, gait problem, light-headedness, numbness, seizures and speech difficulty.   Hematological:  Negative for adenopathy. Does not bruise/bleed easily.   Psychiatric/Behavioral:  Negative for confusion, decreased concentration, depression, sleep disturbance and suicidal ideas. The patient is nervous/anxious.        
CYSTOSCOPY N/A 10/16/2024    Cystoscopy performed by Enmanuel Crowell MD at [facility] OR    CYSTOSCOPY W/ RETROGRADES  05/20/2024    CYSTOSCOPY RETROGRADE PYELOGRAM, BLADDER BIOPSY    DILATION AND CURETTAGE OF UTERUS  05/20/2024    DILATATION AND CURETTAGE HYSTEROSCOPY, VAGINAL / CERVIX. BIOPSY    DILATION AND CURETTAGE OF UTERUS N/A 05/20/2024    DILATATION AND CURETTAGE HYSTEROSCOPY, VAGINAL / CERVIX. BIOPSY performed by Jewell Clayton MD at [facility] OR    IR NEPHROSTOMY CATHETER PLACEMENT Bilateral 09/26/2024    IR Tube change    IR NEPHROSTOMY PERCUTANEOUS LEFT  05/21/2024    IR NEPHROSTOMY PERCUTANEOUS LEFT 5/21/2024 Daniel Gonsalez MD [facility] SPECIAL PROCEDURES    IR NEPHROSTOMY PERCUTANEOUS RIGHT  05/21/2024    IR NEPHROSTOMY PERCUTANEOUS RIGHT 5/21/2024 Daniel Gonsalez MD [facility] SPECIAL PROCEDURES    IR PORT PLACEMENT EQUAL OR GREATER THAN 5 YEARS  06/19/2024    IR PORT PLACEMENT EQUAL OR GREATER THAN 5 YEARS 6/19/2024 Ishan Butterfield MD [facility] SPECIAL PROCEDURES    US LYMPH NODE BIOPSY  06/19/2024    US LYMPH NODE BIOPSY 6/19/2024 [facility] ULTRASOUND       MEDICATIONS:  Current Outpatient Medications   Medication Sig Dispense Refill    silver sulfADIAZINE (SILVADENE) 1 % cream Apply 1g to vulvar sores topically twice daily and as needed after toileting. 400 g 0    valACYclovir (VALTREX) 1 g tablet Take 1 tablet by mouth 2 times daily for 10 days 20 tablet 0    nystatin (MYCOSTATIN) 581142 UNIT/GM powder Apply 3 times daily. 30 g 2    mupirocin (BACTROBAN) 2 % ointment Apply topically 3 times daily. 1 each 3    triamcinolone (KENALOG) 0.1 % cream Apply topically 2 times daily. 80 g 3    prochlorperazine (COMPAZINE) 10 MG tablet Take 1 tablet by mouth every 8 hours as needed (nausea) 40 tablet 1    Psyllium (METAMUCIL PO) Take by mouth      gabapentin (NEURONTIN) 300 MG capsule Take 1 capsule by mouth nightly as needed (pain) for up to 180 days. 90 capsule 1    magnesium hydroxide (MILK OF MAGNESIA) 400

## 2024-11-04 NOTE — TELEPHONE ENCOUNTER
Patient called into office and would like to know if she should be seen. Can come today if needed. Went to [facility] yesterday, and states she is really sore and there are labial sores. Prescribed nystatin powder and took second Diflucan 11/3/2024.

## 2024-11-05 LAB
HSV1 DNA SPEC QL NAA+PROBE: NEGATIVE
HSV2 DNA SPEC QL NAA+PROBE: POSITIVE
SPECIMEN DESCRIPTION: ABNORMAL

## 2024-11-05 NOTE — RESULT ENCOUNTER NOTE
Spoke with patient regarding results, + HSV 2  She has taken three doses of valtrex thus far  Called in topical lidocaine to aid in symptoms  Discussed may need suppressive management after completion of acute treatment while patient is immunosuppressed  Patient asked appropriate questions regarding future sexual health  Reviewed recommendations to avoid skin to skin contact if prodromal symptoms or lesions present.  She voiced understanding and appreciation

## 2024-11-07 LAB
MICROORGANISM SPEC CULT: NORMAL
SERVICE CMNT-IMP: NORMAL
SPECIMEN DESCRIPTION: NORMAL

## 2024-11-08 ENCOUNTER — CARE COORDINATION (OUTPATIENT)
Dept: CARE COORDINATION | Age: 44
End: 2024-11-08

## 2024-11-08 NOTE — CARE COORDINATION
Ambulatory Care Coordination Note     11/8/2024 1:41 PM     Patient outreach attempt by this [facility] today to perform care management follow up . ACM was unable to reach the patient by telephone today; left voice message requesting a return phone call to this ACM.     ACM: Katerine Vogel RN     Care Summary Note:     She has:          Cervical squamous cell carcinoma, anna nephrostomy tubes in place- on medications follows with radiation oncology, oncology,  urology and PCP     Interim  care- nursing, PT/OT     Plan of Care : Continue assessments, education and support                          RPM- N/A                          F/U oncology, nephrostomy tubes,  care     10/31/2024- 10:28 am  Left HIPAA compliant voice message requesting return call @ [phone] re: ACM F/U call.   11/8/2024- 1:42 pm  Left HIPAA compliant voice message requesting return call @ [phone] re: ACM F/U call.       PCP/Specialist follow up:   Future Appointments         Provider Specialty Dept Phone    11/12/2024 12:45 PM Tera Lomeli MD Oncology [phone]    11/13/2024 10:30 AM Madison Valerio PA-C Gynecologic Oncology [phone]    11/14/2024  8:00 AM [facility] MED ONC CHAIR 06 Infusion Therapy [phone]    11/21/2024 12:30 PM Radiologist, Stv Interventional; STV INTERVENT RN POOL; STV INTERVENT RM 1 Radiology Special Procedures [phone]    12/10/2024 2:40 PM Miranda Sofia MD Internal Medicine [phone]    12/12/2024 2:30 PM Jewell Clayton MD Gynecologic Oncology [phone]    1/15/2025 9:20 AM Enmanuel Crowell MD Urology [phone]            Follow Up:   Plan for next [facility] outreach in approximately 1 week to complete:  - goal progression  - education .             mammogram

## 2024-11-11 ENCOUNTER — TELEPHONE (OUTPATIENT)
Dept: GYNECOLOGIC ONCOLOGY | Age: 44
End: 2024-11-11

## 2024-11-11 RX ORDER — VALACYCLOVIR HYDROCHLORIDE 1 G/1
1000 TABLET, FILM COATED ORAL DAILY
Qty: 5 TABLET | Refills: 0 | Status: SHIPPED | OUTPATIENT
Start: 2024-11-15 | End: 2024-11-20

## 2024-11-11 NOTE — TELEPHONE ENCOUNTER
Spoke with patient, she is doing very well. Her vulvar lesions have \"99% resolved\". She has had one episode of \"intense vaginal itching\" She has three days left of Valtrex treatment. She is unable to make appt this week, we will see her in the clinic on 11/19  She will complete current treatment and will begin taking suppressive therapy of Valtrex to pharmacy until patient is seen in clinic  She will stop using silvadene at this time and lidocaine PRN

## 2024-11-11 NOTE — TELEPHONE ENCOUNTER
Pt unable to come in on Wednesday d/t other personal appts.Provider notified. Appt rescheduled for 11/19/24 at 1pm.

## 2024-11-12 ENCOUNTER — OFFICE VISIT (OUTPATIENT)
Dept: ONCOLOGY | Age: 44
End: 2024-11-12
Payer: COMMERCIAL

## 2024-11-12 VITALS
DIASTOLIC BLOOD PRESSURE: 74 MMHG | HEIGHT: 67 IN | SYSTOLIC BLOOD PRESSURE: 120 MMHG | TEMPERATURE: 98.1 F | BODY MASS INDEX: 37.76 KG/M2 | OXYGEN SATURATION: 98 % | WEIGHT: 240.6 LBS | HEART RATE: 115 BPM | RESPIRATION RATE: 16 BRPM

## 2024-11-12 DIAGNOSIS — C53.9 PRIMARY CERVICAL SQUAMOUS CELL CARCINOMA (HCC): Primary | ICD-10-CM

## 2024-11-12 DIAGNOSIS — Z51.11 CHEMOTHERAPY MANAGEMENT, ENCOUNTER FOR: ICD-10-CM

## 2024-11-12 DIAGNOSIS — D69.6 THROMBOCYTOPENIA, UNSPECIFIED (HCC): ICD-10-CM

## 2024-11-12 PROCEDURE — 99215 OFFICE O/P EST HI 40 MIN: CPT | Performed by: INTERNAL MEDICINE

## 2024-11-12 RX ORDER — DIPHENHYDRAMINE HYDROCHLORIDE 50 MG/ML
50 INJECTION INTRAMUSCULAR; INTRAVENOUS ONCE
Status: CANCELLED | OUTPATIENT
Start: 2024-11-14 | End: 2024-11-14

## 2024-11-12 RX ORDER — PALONOSETRON 0.05 MG/ML
0.25 INJECTION, SOLUTION INTRAVENOUS ONCE
Status: CANCELLED | OUTPATIENT
Start: 2024-11-14 | End: 2024-11-14

## 2024-11-12 RX ORDER — MEPERIDINE HYDROCHLORIDE 50 MG/ML
12.5 INJECTION INTRAMUSCULAR; INTRAVENOUS; SUBCUTANEOUS PRN
OUTPATIENT
Start: 2024-11-14

## 2024-11-12 RX ORDER — HEPARIN SODIUM (PORCINE) LOCK FLUSH IV SOLN 100 UNIT/ML 100 UNIT/ML
500 SOLUTION INTRAVENOUS PRN
Status: CANCELLED | OUTPATIENT
Start: 2024-11-14

## 2024-11-12 RX ORDER — SODIUM CHLORIDE 9 MG/ML
INJECTION, SOLUTION INTRAVENOUS CONTINUOUS
OUTPATIENT
Start: 2024-11-14

## 2024-11-12 RX ORDER — DIPHENHYDRAMINE HYDROCHLORIDE 50 MG/ML
50 INJECTION INTRAMUSCULAR; INTRAVENOUS
OUTPATIENT
Start: 2024-11-14

## 2024-11-12 RX ORDER — SODIUM CHLORIDE 9 MG/ML
5-250 INJECTION, SOLUTION INTRAVENOUS PRN
OUTPATIENT
Start: 2024-11-14

## 2024-11-12 RX ORDER — EPINEPHRINE 1 MG/ML
0.3 INJECTION, SOLUTION, CONCENTRATE INTRAVENOUS PRN
OUTPATIENT
Start: 2024-11-14

## 2024-11-12 RX ORDER — FAMOTIDINE 10 MG/ML
20 INJECTION, SOLUTION INTRAVENOUS
OUTPATIENT
Start: 2024-11-14

## 2024-11-12 RX ORDER — ACETAMINOPHEN 325 MG/1
650 TABLET ORAL
OUTPATIENT
Start: 2024-11-14

## 2024-11-12 RX ORDER — PROCHLORPERAZINE EDISYLATE 5 MG/ML
5 INJECTION INTRAMUSCULAR; INTRAVENOUS
OUTPATIENT
Start: 2024-11-14

## 2024-11-12 RX ORDER — SODIUM CHLORIDE 0.9 % (FLUSH) 0.9 %
5-40 SYRINGE (ML) INJECTION PRN
Status: CANCELLED | OUTPATIENT
Start: 2024-11-14

## 2024-11-12 RX ORDER — ONDANSETRON 2 MG/ML
8 INJECTION INTRAMUSCULAR; INTRAVENOUS
OUTPATIENT
Start: 2024-11-14

## 2024-11-12 RX ORDER — FAMOTIDINE 10 MG/ML
20 INJECTION, SOLUTION INTRAVENOUS ONCE
Status: CANCELLED | OUTPATIENT
Start: 2024-11-14 | End: 2024-11-14

## 2024-11-12 RX ORDER — SODIUM CHLORIDE 9 MG/ML
5-250 INJECTION, SOLUTION INTRAVENOUS PRN
Status: CANCELLED | OUTPATIENT
Start: 2024-11-14

## 2024-11-12 RX ORDER — ALBUTEROL SULFATE 90 UG/1
4 INHALANT RESPIRATORY (INHALATION) PRN
OUTPATIENT
Start: 2024-11-14

## 2024-11-12 NOTE — PROGRESS NOTES
side effects.  Was recently treated for genital herpes infection now doing better.  Scheduled for cycle #6 later this week.    During this visit patient's allergy, social, medical, surgical history and medications were reviewed and updated.    Past Medical History:   Past Medical History:   Diagnosis Date    Cancer (HCC)     14 years ago per patient    Cervical cancer (HCC)     Hydronephrosis     anna    Obstructive uropathy     Uterine mass      Past Surgical History:  Past Surgical History:   Procedure Laterality Date    CHOLECYSTECTOMY      CYSTOSCOPY N/A 05/20/2024    CYSTOSCOPY RETROGRADE PYELOGRAM, BLADDER BIOPSY performed by Ludin Barbosa MD at [facility] OR    CYSTOSCOPY N/A 10/16/2024    Cystoscopy performed by Enmanuel Crowell MD at [facility] OR    CYSTOSCOPY W/ RETROGRADES  05/20/2024    CYSTOSCOPY RETROGRADE PYELOGRAM, BLADDER BIOPSY    DILATION AND CURETTAGE OF UTERUS  05/20/2024    DILATATION AND CURETTAGE HYSTEROSCOPY, VAGINAL / CERVIX. BIOPSY    DILATION AND CURETTAGE OF UTERUS N/A 05/20/2024    DILATATION AND CURETTAGE HYSTEROSCOPY, VAGINAL / CERVIX. BIOPSY performed by Jewell Clayton MD at [facility] OR    IR NEPHROSTOMY CATHETER PLACEMENT Bilateral 09/26/2024    IR Tube change    IR NEPHROSTOMY PERCUTANEOUS LEFT  05/21/2024    IR NEPHROSTOMY PERCUTANEOUS LEFT 5/21/2024 Daniel Gonsalez MD [facility] SPECIAL PROCEDURES    IR NEPHROSTOMY PERCUTANEOUS RIGHT  05/21/2024    IR NEPHROSTOMY PERCUTANEOUS RIGHT 5/21/2024 Daniel Gonsalez MD [facility] SPECIAL PROCEDURES    IR PORT PLACEMENT EQUAL OR GREATER THAN 5 YEARS  06/19/2024    IR PORT PLACEMENT EQUAL OR GREATER THAN 5 YEARS 6/19/2024 Ishan Butterfield MD [facility] SPECIAL PROCEDURES    US LYMPH NODE BIOPSY  06/19/2024    US LYMPH NODE BIOPSY 6/19/2024 [facility] ULTRASOUND       Patient Family Social History:  Family History   Adopted: Yes   Problem Relation Age of Onset    Lung Cancer Maternal Grandfather     Breast Cancer Other     Cancer Maternal Uncle      Social

## 2024-11-14 ENCOUNTER — HOSPITAL ENCOUNTER (OUTPATIENT)
Dept: INFUSION THERAPY | Age: 44
Discharge: HOME | End: 2024-11-14
Payer: COMMERCIAL

## 2024-11-14 VITALS
SYSTOLIC BLOOD PRESSURE: 134 MMHG | DIASTOLIC BLOOD PRESSURE: 81 MMHG | HEIGHT: 67 IN | WEIGHT: 239.8 LBS | RESPIRATION RATE: 16 BRPM | HEART RATE: 98 BPM | TEMPERATURE: 97.6 F | OXYGEN SATURATION: 98 % | BODY MASS INDEX: 37.64 KG/M2

## 2024-11-14 DIAGNOSIS — C53.9 PRIMARY CERVICAL SQUAMOUS CELL CARCINOMA (HCC): Primary | ICD-10-CM

## 2024-11-14 LAB
ALBUMIN SERPL-MCNC: 3.7 G/DL (ref 3.5–5.2)
ALBUMIN/GLOB SERPL: 1 {RATIO} (ref 1–2.5)
ALP SERPL-CCNC: 122 U/L (ref 35–104)
ALT SERPL-CCNC: <5 U/L (ref 5–33)
ANION GAP SERPL CALCULATED.3IONS-SCNC: 9 MMOL/L (ref 9–17)
AST SERPL-CCNC: 11 U/L
BASOPHILS # BLD: 0 K/UL (ref 0–0.2)
BASOPHILS NFR BLD: 0 % (ref 0–2)
BILIRUB SERPL-MCNC: 0.3 MG/DL (ref 0.3–1.2)
BUN SERPL-MCNC: 13 MG/DL (ref 6–20)
BUN/CREAT SERPL: 14 (ref 9–20)
CALCIUM SERPL-MCNC: 9.1 MG/DL (ref 8.6–10.4)
CHLORIDE SERPL-SCNC: 104 MMOL/L (ref 98–107)
CO2 SERPL-SCNC: 25 MMOL/L (ref 20–31)
CREAT SERPL-MCNC: 0.9 MG/DL (ref 0.5–0.9)
EOSINOPHIL # BLD: 0.05 K/UL (ref 0–0.44)
EOSINOPHILS RELATIVE PERCENT: 1 % (ref 1–4)
ERYTHROCYTE [DISTWIDTH] IN BLOOD BY AUTOMATED COUNT: 22.5 % (ref 11.8–14.4)
GFR, ESTIMATED: 80 ML/MIN/1.73M2
GLUCOSE SERPL-MCNC: 126 MG/DL (ref 70–99)
HCG SERPL QL: NEGATIVE
HCT VFR BLD AUTO: 22.1 % (ref 36.3–47.1)
HGB BLD-MCNC: 7.2 G/DL (ref 11.9–15.1)
IMM GRANULOCYTES # BLD AUTO: 0 K/UL (ref 0–0.3)
IMM GRANULOCYTES NFR BLD: 0 %
LYMPHOCYTES NFR BLD: 0.97 K/UL (ref 1.1–3.7)
LYMPHOCYTES RELATIVE PERCENT: 21 % (ref 24–43)
MCH RBC QN AUTO: 33 PG (ref 25.2–33.5)
MCHC RBC AUTO-ENTMCNC: 32.6 G/DL (ref 25.2–33.5)
MCV RBC AUTO: 101.4 FL (ref 82.6–102.9)
MONOCYTES NFR BLD: 0.32 K/UL (ref 0.1–1.2)
MONOCYTES NFR BLD: 7 % (ref 3–12)
MORPHOLOGY: ABNORMAL
NEUTROPHILS NFR BLD: 71 % (ref 36–65)
NEUTS SEG NFR BLD: 3.26 K/UL (ref 1.5–8.1)
NRBC BLD-RTO: 0 PER 100 WBC
PLATELET # BLD AUTO: 107 K/UL (ref 138–453)
PMV BLD AUTO: 10.7 FL (ref 8.1–13.5)
POTASSIUM SERPL-SCNC: 4.3 MMOL/L (ref 3.7–5.3)
PROT SERPL-MCNC: 7.4 G/DL (ref 6.4–8.3)
RBC # BLD AUTO: 2.18 M/UL (ref 3.95–5.11)
SODIUM SERPL-SCNC: 138 MMOL/L (ref 135–144)
WBC OTHER # BLD: 4.6 K/UL (ref 3.5–11.3)

## 2024-11-14 PROCEDURE — 86850 RBC ANTIBODY SCREEN: CPT

## 2024-11-14 PROCEDURE — 96417 CHEMO IV INFUS EACH ADDL SEQ: CPT

## 2024-11-14 PROCEDURE — 86900 BLOOD TYPING SEROLOGIC ABO: CPT

## 2024-11-14 PROCEDURE — 85025 COMPLETE CBC W/AUTO DIFF WBC: CPT

## 2024-11-14 PROCEDURE — 80053 COMPREHEN METABOLIC PANEL: CPT

## 2024-11-14 PROCEDURE — 2580000003 HC RX 258: Performed by: INTERNAL MEDICINE

## 2024-11-14 PROCEDURE — 2500000003 HC RX 250 WO HCPCS: Performed by: INTERNAL MEDICINE

## 2024-11-14 PROCEDURE — 96367 TX/PROPH/DG ADDL SEQ IV INF: CPT

## 2024-11-14 PROCEDURE — 96375 TX/PRO/DX INJ NEW DRUG ADDON: CPT

## 2024-11-14 PROCEDURE — 36430 TRANSFUSION BLD/BLD COMPNT: CPT

## 2024-11-14 PROCEDURE — 96413 CHEMO IV INFUSION 1 HR: CPT

## 2024-11-14 PROCEDURE — 96415 CHEMO IV INFUSION ADDL HR: CPT

## 2024-11-14 PROCEDURE — 84703 CHORIONIC GONADOTROPIN ASSAY: CPT

## 2024-11-14 PROCEDURE — 6360000002 HC RX W HCPCS: Performed by: INTERNAL MEDICINE

## 2024-11-14 PROCEDURE — P9016 RBC LEUKOCYTES REDUCED: HCPCS

## 2024-11-14 PROCEDURE — 86920 COMPATIBILITY TEST SPIN: CPT

## 2024-11-14 PROCEDURE — 86901 BLOOD TYPING SEROLOGIC RH(D): CPT

## 2024-11-14 RX ORDER — HEPARIN 100 UNIT/ML
500 SYRINGE INTRAVENOUS PRN
Status: DISCONTINUED | OUTPATIENT
Start: 2024-11-14 | End: 2024-11-15 | Stop reason: HOSPADM

## 2024-11-14 RX ORDER — DIPHENHYDRAMINE HYDROCHLORIDE 50 MG/ML
50 INJECTION INTRAMUSCULAR; INTRAVENOUS ONCE
Status: COMPLETED | OUTPATIENT
Start: 2024-11-14 | End: 2024-11-14

## 2024-11-14 RX ORDER — PALONOSETRON 0.05 MG/ML
0.25 INJECTION, SOLUTION INTRAVENOUS ONCE
Status: COMPLETED | OUTPATIENT
Start: 2024-11-14 | End: 2024-11-14

## 2024-11-14 RX ORDER — SODIUM CHLORIDE 0.9 % (FLUSH) 0.9 %
5-40 SYRINGE (ML) INJECTION PRN
Status: DISCONTINUED | OUTPATIENT
Start: 2024-11-14 | End: 2024-11-15 | Stop reason: HOSPADM

## 2024-11-14 RX ORDER — SODIUM CHLORIDE 9 MG/ML
INJECTION, SOLUTION INTRAVENOUS PRN
Status: DISCONTINUED | OUTPATIENT
Start: 2024-11-14 | End: 2024-11-15 | Stop reason: HOSPADM

## 2024-11-14 RX ORDER — SODIUM CHLORIDE 9 MG/ML
5-250 INJECTION, SOLUTION INTRAVENOUS PRN
Status: DISCONTINUED | OUTPATIENT
Start: 2024-11-14 | End: 2024-11-15 | Stop reason: HOSPADM

## 2024-11-14 RX ORDER — DEXAMETHASONE SODIUM PHOSPHATE 10 MG/ML
10 INJECTION, SOLUTION INTRAMUSCULAR; INTRAVENOUS ONCE
Status: COMPLETED | OUTPATIENT
Start: 2024-11-14 | End: 2024-11-14

## 2024-11-14 RX ADMIN — HEPARIN 500 UNITS: 100 SYRINGE at 15:38

## 2024-11-14 RX ADMIN — FAMOTIDINE 20 MG: 10 INJECTION, SOLUTION INTRAVENOUS at 09:03

## 2024-11-14 RX ADMIN — CARBOPLATIN 625 MG: 10 INJECTION INTRAVENOUS at 12:53

## 2024-11-14 RX ADMIN — SODIUM CHLORIDE 20 ML/HR: 9 INJECTION, SOLUTION INTRAVENOUS at 09:49

## 2024-11-14 RX ADMIN — PACLITAXEL 396 MG: 6 INJECTION, SOLUTION INTRAVENOUS at 09:48

## 2024-11-14 RX ADMIN — DIPHENHYDRAMINE HYDROCHLORIDE 50 MG: 50 INJECTION INTRAMUSCULAR; INTRAVENOUS at 09:03

## 2024-11-14 RX ADMIN — PALONOSETRON HYDROCHLORIDE 0.25 MG: 0.25 INJECTION, SOLUTION INTRAVENOUS at 09:03

## 2024-11-14 RX ADMIN — SODIUM CHLORIDE 200 ML/HR: 9 INJECTION, SOLUTION INTRAVENOUS at 09:02

## 2024-11-14 RX ADMIN — SODIUM CHLORIDE 150 MG: 9 INJECTION, SOLUTION INTRAVENOUS at 09:18

## 2024-11-14 RX ADMIN — DEXAMETHASONE SODIUM PHOSPHATE 10 MG: 10 INJECTION, SOLUTION INTRAMUSCULAR; INTRAVENOUS at 09:03

## 2024-11-14 NOTE — PROGRESS NOTES
Patient arrived for Carbo and Taxol infusion.  VSS as charted.  Port access without complication.  Lab draw off of port access.  Patient also had a blood transfusion of one unit.  Patient tolerated infusion well.  Patient left infusion center with all belongings and steady gate.  New appt set for 3 weeks.

## 2024-11-14 NOTE — FLOWSHEET NOTE
Spiritual Health History and Assessment/Progress Note  [facility]    Spiritual/Emotional Needs,  ,  ,      Name: Evangelina King MRN: [id number]    Age: 44 y.o.     Sex: female   Language: English   Church: None   <principal problem not specified>     Date: 11/14/2024            Total Time Calculated: 156 min              Spiritual Assessment began in [facility]  MED ONC        Referral/Consult From: Rounding   Encounter Overview/Reason: Spiritual/Emotional Needs  Service Provided For: Patient    Patient had recently returned from her family vacation, was excited about the good results of her most recent doctor's report, and happy that this was her last chemotherapy appointment.  Patient is looking forward to her next PET scan and doctor's appointment with hopes of scheduling a hysterectomy.  Patient shared about her love of the [place] season, her family, memories, vacation, and potential plans for the future.  Patient requested the  pray for her at another time, and specifically on the dates of her upcoming appointments.    Johanne, Belief, Meaning:   Patient has beliefs or practices that help with coping during difficult times  Family/Friends No family/friends present      Importance and Influence:  Patient has spiritual/personal beliefs that influence decisions regarding their health  Family/Friends No family/friends present    Community:  Patient feels well-supported. Support system includes: Spouse/Partner, Parent/s, Children, Friends, and Extended family  Family/Friends No family/friends present    Assessment and Plan of Care:     Patient Interventions include: Facilitated expression of thoughts and feelings, Affirmed coping skills/support systems, and Provided sacramental/Denominational ritual  Family/Friends Interventions include: No family/friends present    Patient Plan of Care: Spiritual Care available upon further referral  Family/Friends Plan of Care: No family/friends  present    Electronically signed by Chaplain Hugo on 11/14/2024 at 3:37 PM

## 2024-11-14 NOTE — PROGRESS NOTES
Message to Dr. Lomeli regarding Hgb of 7.2.  Patient denies SOB or tiredness.  Dr Lomeli states to Proceeded with treatment also transfuse one unit red cell.

## 2024-11-14 NOTE — PROGRESS NOTES
VAD de-accessed after 10ml NS and 5ml of heparin.  Band aid applied to site.  No sign of reaction to infusion at this time.  Pt ambulated out infusion center door without difficulty.     Winlevi Pregnancy And Lactation Text: This medication is considered safe during pregnancy and breastfeeding.

## 2024-11-15 ENCOUNTER — CARE COORDINATION (OUTPATIENT)
Dept: CARE COORDINATION | Age: 44
End: 2024-11-15

## 2024-11-15 LAB
ABO/RH: NORMAL
ANTIBODY SCREEN: NEGATIVE
ARM BAND NUMBER: NORMAL
BLOOD BANK BLOOD PRODUCT EXPIRATION DATE: NORMAL
BLOOD BANK DISPENSE STATUS: NORMAL
BLOOD BANK ISBT PRODUCT BLOOD TYPE: 600
BLOOD BANK PRODUCT CODE: NORMAL
BLOOD BANK SAMPLE EXPIRATION: NORMAL
BLOOD BANK UNIT TYPE AND RH: NORMAL
BPU ID: NORMAL
COMPONENT: NORMAL
CROSSMATCH RESULT: NORMAL
TRANSFUSION STATUS: NORMAL
UNIT DIVISION: 0
UNIT ISSUE DATE/TIME: NORMAL

## 2024-11-15 NOTE — CARE COORDINATION
Ambulatory Care Coordination Note     11/15/2024 10:50 AM     Patient Current Location:  Home: [address] [place], [place] [address] [place] [address]     ACM contacted the patient by telephone.     ACM: Katerine Vogel RN     Care Summary Note:     She has:          Cervical squamous cell carcinoma, anna nephrostomy tubes in place- on medications follows with radiation oncology, oncology,  urology and PCP     Interim  care- nursing, PT/OT     Plan of Care : Continue assessments, education and support                          RPM- N/A                          F/U oncology, nephrostomy tubes,  care     Spoke with Evangelina. She continues with nephrostomy tubes.   She has completed chemo 11/14/2024- to have PET scan and then oncology f/u to see on next step.   She had to have blood transfusion yesterday.   She reports she is doing well.   She has  care that continues to follow.   She is working with [employer] for financial assistance.   She has a oncology nurse navigator.   Education completed.  She is scheduled to discuss a hysterectomy.      Assessments Completed:   General Assessment    Do you have any symptoms that are causing concern?: No        PCP/Specialist follow up:   Future Appointments         Provider Specialty Dept Phone    11/19/2024 1:00 PM Madison Valerio PA-C Gynecologic Oncology [phone]    11/21/2024 12:30 PM Radiologist, Stv Interventional; STV INTERVENT RN POOL; STV INTERVENT RM 1 Radiology Special Procedures [phone]    11/25/2024 10:30 AM (Arrive by 10:00 AM) [place] INJECTION ROOM Radiology [phone]    11/25/2024 11:30 AM (Arrive by 11:00 AM) [place] [facility] RM 2; [facility] PET/CT ROOM Radiology [phone]    12/3/2024 11:00 AM Tera Lomeli MD Oncology [phone]    12/4/2024 8:00 AM Enmanuel Crowell MD Urology [phone]    12/10/2024 2:40 PM Miranda Sofia MD Internal Medicine [phone]    12/12/2024 2:30 PM Jewell Clayton MD Gynecologic Oncology [phone]    12/19/2024 2:00 PM [facility]

## 2024-11-18 ENCOUNTER — TELEPHONE (OUTPATIENT)
Dept: INTERNAL MEDICINE | Age: 44
End: 2024-11-18

## 2024-11-18 NOTE — TELEPHONE ENCOUNTER
INTERNAL MEDICINE RESIDENT ADMISSION NOTE     Patient: Brian Ortiz Date: 10/13/2024   YOB: 1961 Admission Date: 10/13/2024   MRN: [id number] Attending: Miryam Rachel MD     Team: Fredis    Chief Complaint: numbness  Chief Complaint   Patient presents with    Neurologic Problem        HISTORY OF PRESENT ILLNESS     Brian Ortiz is a 63 Patient reports that he first noticed numbness across the left side of his body beginning two days ago. Numbness most pronounced on left side of face and left hand. Also describes decreased strength in his left arm and leg. Utilizes a cane at baseline to ambulate due to severe right hip osteoarthritis and noticed difficulty in gripping his cane with his left hand the last few days. Denies any changes in bowel / bladder function. No headaches, dizziness, shortness of breath, chest pain, abdominal pain, dysuria or LE swelling. Does endorse a cough at this time; has a chronic cough from smoking but feeling he has been coughing more the last few days.     In the ED, EKG unremarkable. Trop negative. Exam notable for diminished sensation in left face/arm/leg. CT head with subtle superimposed areas of acute / subacute ischemia; no evidence of hemorrhage. Labs notable for cr 1.9, otherwise unremarkable. Patient was admitted to [facility] for further care.     Code Status: full     Social History: prior cocaine use, 3-5 cigarettes per week    PCP: Nata Mosquera MD    REVIEW OF SYSTEMS     Review of Systems   Constitutional:  Negative for chills and fever.   Eyes:  Negative for blurred vision.   Respiratory:  Positive for cough.    Cardiovascular:  Negative for chest pain and leg swelling.   Gastrointestinal:  Negative for abdominal pain, diarrhea, nausea and  Stephane with Physical therapy called and states that they are extending her physical therapy to one time a week for 8 weeks.    PTA amlodipine and losartan-hctz  -PTA ASA and clopidogrel    #JACINTO  Mellisa Gifford MD (Allie)  Transitional  Year Intern  Pager [phone]  [phone]  [place]: , [phone].    [facility]: , [phone]      

## 2024-11-19 ENCOUNTER — OFFICE VISIT (OUTPATIENT)
Dept: GYNECOLOGIC ONCOLOGY | Age: 44
End: 2024-11-19
Payer: COMMERCIAL

## 2024-11-19 VITALS
HEART RATE: 113 BPM | DIASTOLIC BLOOD PRESSURE: 87 MMHG | OXYGEN SATURATION: 100 % | WEIGHT: 239 LBS | HEIGHT: 67 IN | SYSTOLIC BLOOD PRESSURE: 141 MMHG | BODY MASS INDEX: 37.51 KG/M2

## 2024-11-19 DIAGNOSIS — C53.9 PRIMARY CERVICAL SQUAMOUS CELL CARCINOMA (HCC): ICD-10-CM

## 2024-11-19 DIAGNOSIS — B00.9 HERPES SIMPLEX DISEASE: Primary | ICD-10-CM

## 2024-11-19 PROCEDURE — 99214 OFFICE O/P EST MOD 30 MIN: CPT | Performed by: PHYSICIAN ASSISTANT

## 2024-11-19 RX ORDER — VALACYCLOVIR HYDROCHLORIDE 500 MG/1
500 TABLET, FILM COATED ORAL DAILY
Qty: 90 TABLET | Refills: 3 | Status: SHIPPED | OUTPATIENT
Start: 2024-11-19

## 2024-11-19 ASSESSMENT — ENCOUNTER SYMPTOMS
NAUSEA: 1
RESPIRATORY NEGATIVE: 1
EYES NEGATIVE: 1

## 2024-11-19 NOTE — PROGRESS NOTES
Review of Systems   Constitutional:  Positive for fatigue (4-5 days after chemo).   HENT:  Negative.     Eyes: Negative.    Respiratory: Negative.     Cardiovascular: Negative.    Gastrointestinal:  Positive for nausea (compazine helps).   Endocrine: Positive for hot flashes.   Genitourinary: Negative.     Musculoskeletal:  Positive for myalgias (after chemo).   Skin: Negative.    Neurological:  Positive for light-headedness.   Hematological:  Bruises/bleeds easily.

## 2024-11-19 NOTE — PROGRESS NOTES
[facility]  [address], [address]  [place] [address]    Acute Care Visit      Evangelina King  2024                       Primary Care Physician: Miranda Sofia MD    CC:   Chief Complaint   Patient presents with    Follow-up     Patient is being seen for follow-up from results.         HPI: Evangelina King is a  No LMP recorded. (Menstrual status: Not having periods).    Patient is a 44-year-old premenopausal female who is known to [facility] for diagnosis of invasive squamous cell carcinoma based on cervical, bladder and inguinal LN biopsies (May/2024)    She initially presented to local emergency department in May 2024 with concerns of flank pain.  CT abdomen pelvis revealed enlarged uterus with thickened endometrium, bilateral hydronephrosis and hydroureter and pelvic adenopathy.    She completed PET CT scan in 2024 with FDG activity and retroperitoneal, iliac and inguinal lymphadenopathy, markedly enlarged uterus with heterogeneous FDG uptake within the endometrial walls.    Recommend to complete neoadjuvant chemo radiation therapy. Patient completed 3000cGy palliative IMRT pelvis on 2024 and began systemic chemotherapy carboplatin Taxol on 2024. Bevacuzimab on hold secondary to tumor involvement within bladder and concern for fistulization.     She continues to follow with urology for bilateral percutaneous nephrostomy management.    Most recent cystoscopy 2024 with mild inflammatory changes within mucosa no tumors noted.    Next tube exchanged scheduled 2024.     Last chest abdomen pelvis 2024 revealed positive response to therapy with interval decrease size of enlarged pelvic lymph nodes and overall uterine size.    She was seen in clinic on 2024 with concerns of vulvar lesions and pain present for several weeks. Cultures positive for herpes infection. Treated with Valtrex.    Recently completed cycle 6

## 2024-11-20 NOTE — PROGRESS NOTES
11/20/24 Pt states she needs to change date of IR appt which is scheduled for tomorrow. Dr. Crowell's office ([place]) notified.

## 2024-11-22 ENCOUNTER — OFFICE VISIT (OUTPATIENT)
Dept: PRIMARY CARE CLINIC | Age: 44
End: 2024-11-22
Payer: COMMERCIAL

## 2024-11-22 VITALS
OXYGEN SATURATION: 99 % | TEMPERATURE: 98.9 F | WEIGHT: 235 LBS | RESPIRATION RATE: 18 BRPM | HEIGHT: 67 IN | BODY MASS INDEX: 36.88 KG/M2 | SYSTOLIC BLOOD PRESSURE: 118 MMHG | DIASTOLIC BLOOD PRESSURE: 80 MMHG | HEART RATE: 114 BPM

## 2024-11-22 DIAGNOSIS — J01.00 ACUTE NON-RECURRENT MAXILLARY SINUSITIS: Primary | ICD-10-CM

## 2024-11-22 PROCEDURE — 99213 OFFICE O/P EST LOW 20 MIN: CPT | Performed by: NURSE PRACTITIONER

## 2024-11-22 RX ORDER — AZITHROMYCIN 250 MG/1
TABLET, FILM COATED ORAL
Qty: 1 PACKET | Refills: 0 | Status: SHIPPED | OUTPATIENT
Start: 2024-11-22 | End: 2024-11-27

## 2024-11-22 RX ORDER — BENZONATATE 100 MG/1
100 CAPSULE ORAL 3 TIMES DAILY PRN
Qty: 30 CAPSULE | Refills: 0 | Status: SHIPPED | OUTPATIENT
Start: 2024-11-22 | End: 2024-12-02

## 2024-11-22 ASSESSMENT — ENCOUNTER SYMPTOMS
SORE THROAT: 0
VOMITING: 0
COUGH: 1
RHINORRHEA: 1
DIARRHEA: 0
SHORTNESS OF BREATH: 0
WHEEZING: 0
SINUS PRESSURE: 1
NAUSEA: 0

## 2024-11-22 NOTE — PROGRESS NOTES
GFR test (Diabetes, CKD 3-4, OR last GFR 15-59)  Discontinued       Subjective:     Review of Systems   Constitutional:  Positive for fatigue. Negative for activity change and appetite change.   HENT:  Positive for congestion, postnasal drip, rhinorrhea and sinus pressure. Negative for sore throat.    Respiratory:  Positive for cough. Negative for shortness of breath and wheezing.    Cardiovascular:  Negative for chest pain and palpitations.   Gastrointestinal:  Negative for diarrhea, nausea and vomiting.       Objective:     Physical Exam  Vitals and nursing note reviewed.   Constitutional:       Appearance: Normal appearance.   HENT:      Head: Normocephalic and atraumatic.      Salivary Glands: Right salivary gland is tender.      Right Ear: Tympanic membrane, ear canal and external ear normal.      Left Ear: Tympanic membrane, ear canal and external ear normal.      Nose: Congestion present.      Right Sinus: Maxillary sinus tenderness present.      Left Sinus: Maxillary sinus tenderness present.      Mouth/Throat:      Mouth: Mucous membranes are moist.      Pharynx: Oropharynx is clear.   Cardiovascular:      Rate and Rhythm: Normal rate and regular rhythm.      Heart sounds: Normal heart sounds.   Pulmonary:      Effort: Pulmonary effort is normal.      Breath sounds: Normal breath sounds.   Skin:     Capillary Refill: Capillary refill takes less than 2 seconds.   Neurological:      General: No focal deficit present.      Mental Status: She is alert and oriented to person, place, and time.     /80 (Site: Right Upper Arm, Position: Sitting, Cuff Size: Large Adult)   Pulse (!) 114   Temp 98.9 °F (37.2 °C) (Tympanic)   Resp 18   Ht 1.702 m (5' 7\")   Wt 106.6 kg (235 lb)   SpO2 99%   BMI 36.81 kg/m²     Assessment:       Diagnosis Orders   1. Acute non-recurrent maxillary sinusitis                Results    Plan:   Assessment & Plan   Assessment & Plan  1. Sinus infection.  She presents with sinus

## 2024-11-25 ENCOUNTER — HOSPITAL ENCOUNTER (OUTPATIENT)
Dept: NUCLEAR MEDICINE | Age: 44
Discharge: HOME | End: 2024-11-27
Attending: INTERNAL MEDICINE
Payer: COMMERCIAL

## 2024-11-25 DIAGNOSIS — D69.6 THROMBOCYTOPENIA, UNSPECIFIED (HCC): ICD-10-CM

## 2024-11-25 DIAGNOSIS — C53.9 PRIMARY CERVICAL SQUAMOUS CELL CARCINOMA (HCC): ICD-10-CM

## 2024-11-25 DIAGNOSIS — Z51.11 CHEMOTHERAPY MANAGEMENT, ENCOUNTER FOR: ICD-10-CM

## 2024-11-25 LAB — GLUCOSE BLD-MCNC: 97 MG/DL (ref 65–105)

## 2024-11-25 PROCEDURE — 78815 PET IMAGE W/CT SKULL-THIGH: CPT

## 2024-11-25 PROCEDURE — 3430000000 HC RX DIAGNOSTIC RADIOPHARMACEUTICAL: Performed by: INTERNAL MEDICINE

## 2024-11-25 PROCEDURE — 2580000003 HC RX 258: Performed by: INTERNAL MEDICINE

## 2024-11-25 PROCEDURE — 82947 ASSAY GLUCOSE BLOOD QUANT: CPT

## 2024-11-25 PROCEDURE — A9609 HC RX DIAGNOSTIC RADIOPHARMACEUTICAL: HCPCS | Performed by: INTERNAL MEDICINE

## 2024-11-25 RX ORDER — SODIUM CHLORIDE 0.9 % (FLUSH) 0.9 %
10 SYRINGE (ML) INJECTION ONCE
Status: COMPLETED | OUTPATIENT
Start: 2024-11-25 | End: 2024-11-25

## 2024-11-25 RX ORDER — FLUDEOXYGLUCOSE F 18 200 MCI/ML
10 INJECTION, SOLUTION INTRAVENOUS
Status: COMPLETED | OUTPATIENT
Start: 2024-11-25 | End: 2024-11-25

## 2024-11-25 RX ADMIN — SODIUM CHLORIDE, PRESERVATIVE FREE 10 ML: 5 INJECTION INTRAVENOUS at 10:23

## 2024-11-25 RX ADMIN — FLUDEOXYGLUCOSE F 18 12.1 MILLICURIE: 200 INJECTION, SOLUTION INTRAVENOUS at 10:23

## 2024-11-28 ENCOUNTER — HOSPITAL ENCOUNTER (INPATIENT)
Age: 44
LOS: 1 days | Discharge: HOME HEALTH | DRG: 698 | End: 2024-11-29
Attending: EMERGENCY MEDICINE | Admitting: FAMILY MEDICINE
Payer: COMMERCIAL

## 2024-11-28 DIAGNOSIS — N39.0 URINARY TRACT INFECTION WITHOUT HEMATURIA, SITE UNSPECIFIED: ICD-10-CM

## 2024-11-28 DIAGNOSIS — D64.81 ANEMIA DUE TO CHEMOTHERAPY: ICD-10-CM

## 2024-11-28 DIAGNOSIS — R10.9 LEFT FLANK PAIN: Primary | ICD-10-CM

## 2024-11-28 DIAGNOSIS — D84.821 IMMUNOSUPPRESSED DUE TO CHEMOTHERAPY (HCC): ICD-10-CM

## 2024-11-28 DIAGNOSIS — Z79.899 IMMUNOSUPPRESSED DUE TO CHEMOTHERAPY (HCC): ICD-10-CM

## 2024-11-28 DIAGNOSIS — T45.1X5A IMMUNOSUPPRESSED DUE TO CHEMOTHERAPY (HCC): ICD-10-CM

## 2024-11-28 DIAGNOSIS — T45.1X5A ANEMIA DUE TO CHEMOTHERAPY: ICD-10-CM

## 2024-11-28 PROBLEM — D64.9 SYMPTOMATIC ANEMIA: Status: ACTIVE | Noted: 2024-11-28

## 2024-11-28 PROBLEM — T83.512A URINARY TRACT INFECTION ASSOCIATED WITH NEPHROSTOMY CATHETER, INITIAL ENCOUNTER (HCC): Status: ACTIVE | Noted: 2024-11-28

## 2024-11-28 PROBLEM — D70.9 NEUTROPENIA (HCC): Status: ACTIVE | Noted: 2024-11-28

## 2024-11-28 PROBLEM — T83.512A URINARY TRACT INFECTION ASSOCIATED WITH NEPHROSTOMY CATHETER (HCC): Status: ACTIVE | Noted: 2024-11-28

## 2024-11-28 LAB
ANION GAP SERPL CALCULATED.3IONS-SCNC: 8 MMOL/L (ref 9–17)
BACTERIA URNS QL MICRO: ABNORMAL
BASOPHILS # BLD: <0.03 K/UL (ref 0–0.2)
BASOPHILS NFR BLD: 1 % (ref 0–2)
BILIRUB UR QL STRIP: NEGATIVE
BUN SERPL-MCNC: 19 MG/DL (ref 6–20)
BUN/CREAT SERPL: 17 (ref 9–20)
CALCIUM SERPL-MCNC: 8.9 MG/DL (ref 8.6–10.4)
CHARACTER UR: ABNORMAL
CHLORIDE SERPL-SCNC: 101 MMOL/L (ref 98–107)
CLARITY UR: CLEAR
CO2 SERPL-SCNC: 27 MMOL/L (ref 20–31)
COLOR UR: YELLOW
CREAT SERPL-MCNC: 1.1 MG/DL (ref 0.5–0.9)
EOSINOPHIL # BLD: <0.03 K/UL (ref 0–0.44)
EOSINOPHILS RELATIVE PERCENT: 1 % (ref 1–4)
EPI CELLS #/AREA URNS HPF: ABNORMAL /HPF (ref 0–5)
ERYTHROCYTE [DISTWIDTH] IN BLOOD BY AUTOMATED COUNT: 20.6 % (ref 11.8–14.4)
GFR, ESTIMATED: 63 ML/MIN/1.73M2
GLUCOSE SERPL-MCNC: 104 MG/DL (ref 70–99)
GLUCOSE UR STRIP-MCNC: NEGATIVE MG/DL
HCG UR QL: NEGATIVE
HCT VFR BLD AUTO: 18.2 % (ref 36.3–47.1)
HGB BLD-MCNC: 6.1 G/DL (ref 11.9–15.1)
HGB UR QL STRIP.AUTO: ABNORMAL
IMM GRANULOCYTES # BLD AUTO: <0.03 K/UL (ref 0–0.3)
IMM GRANULOCYTES NFR BLD: 1 %
KETONES UR STRIP-MCNC: NEGATIVE MG/DL
LACTATE BLDV-SCNC: 0.9 MMOL/L (ref 0.5–2.2)
LEUKOCYTE ESTERASE UR QL STRIP: ABNORMAL
LYMPHOCYTES NFR BLD: 0.52 K/UL (ref 1.1–3.7)
LYMPHOCYTES RELATIVE PERCENT: 31 % (ref 24–43)
MCH RBC QN AUTO: 33 PG (ref 25.2–33.5)
MCHC RBC AUTO-ENTMCNC: 33.5 G/DL (ref 25.2–33.5)
MCV RBC AUTO: 98.4 FL (ref 82.6–102.9)
MONOCYTES NFR BLD: 0.45 K/UL (ref 0.1–1.2)
MONOCYTES NFR BLD: 27 % (ref 3–12)
NEUTROPHILS NFR BLD: 40 % (ref 36–65)
NEUTS SEG NFR BLD: 0.66 K/UL (ref 1.5–8.1)
NITRITE UR QL STRIP: POSITIVE
NRBC BLD-RTO: 0 PER 100 WBC
PH UR STRIP: 6 [PH] (ref 5–6)
PLATELET # BLD AUTO: 104 K/UL (ref 138–453)
PMV BLD AUTO: 9.2 FL (ref 8.1–13.5)
POTASSIUM SERPL-SCNC: 4 MMOL/L (ref 3.7–5.3)
PROT UR STRIP-MCNC: ABNORMAL MG/DL
RBC # BLD AUTO: 1.85 M/UL (ref 3.95–5.11)
RBC #/AREA URNS HPF: ABNORMAL /HPF (ref 0–4)
SODIUM SERPL-SCNC: 136 MMOL/L (ref 135–144)
SP GR UR STRIP: 1.02 (ref 1.01–1.02)
UROBILINOGEN UR STRIP-ACNC: NORMAL EU/DL (ref 0–1)
WBC #/AREA URNS HPF: ABNORMAL /HPF (ref 0–4)
WBC OTHER # BLD: 1.7 K/UL (ref 3.5–11.3)

## 2024-11-28 PROCEDURE — 87077 CULTURE AEROBIC IDENTIFY: CPT

## 2024-11-28 PROCEDURE — 86920 COMPATIBILITY TEST SPIN: CPT

## 2024-11-28 PROCEDURE — 87040 BLOOD CULTURE FOR BACTERIA: CPT

## 2024-11-28 PROCEDURE — 86901 BLOOD TYPING SEROLOGIC RH(D): CPT

## 2024-11-28 PROCEDURE — 36415 COLL VENOUS BLD VENIPUNCTURE: CPT

## 2024-11-28 PROCEDURE — 6370000000 HC RX 637 (ALT 250 FOR IP): Performed by: EMERGENCY MEDICINE

## 2024-11-28 PROCEDURE — 87086 URINE CULTURE/COLONY COUNT: CPT

## 2024-11-28 PROCEDURE — 96374 THER/PROPH/DIAG INJ IV PUSH: CPT

## 2024-11-28 PROCEDURE — 83605 ASSAY OF LACTIC ACID: CPT

## 2024-11-28 PROCEDURE — 85025 COMPLETE CBC W/AUTO DIFF WBC: CPT

## 2024-11-28 PROCEDURE — 99285 EMERGENCY DEPT VISIT HI MDM: CPT

## 2024-11-28 PROCEDURE — 2060000000 HC ICU INTERMEDIATE R&B

## 2024-11-28 PROCEDURE — 80048 BASIC METABOLIC PNL TOTAL CA: CPT

## 2024-11-28 PROCEDURE — 81025 URINE PREGNANCY TEST: CPT

## 2024-11-28 PROCEDURE — 99222 1ST HOSP IP/OBS MODERATE 55: CPT

## 2024-11-28 PROCEDURE — 86850 RBC ANTIBODY SCREEN: CPT

## 2024-11-28 PROCEDURE — 86900 BLOOD TYPING SEROLOGIC ABO: CPT

## 2024-11-28 PROCEDURE — 81001 URINALYSIS AUTO W/SCOPE: CPT

## 2024-11-28 PROCEDURE — 6360000002 HC RX W HCPCS: Performed by: EMERGENCY MEDICINE

## 2024-11-28 RX ORDER — LEVOFLOXACIN 5 MG/ML
750 INJECTION, SOLUTION INTRAVENOUS EVERY 24 HOURS
Status: DISCONTINUED | OUTPATIENT
Start: 2024-11-28 | End: 2024-11-29 | Stop reason: HOSPADM

## 2024-11-28 RX ORDER — TRAMADOL HYDROCHLORIDE 50 MG/1
50 TABLET ORAL ONCE
Status: COMPLETED | OUTPATIENT
Start: 2024-11-28 | End: 2024-11-28

## 2024-11-28 RX ADMIN — TRAMADOL HYDROCHLORIDE 50 MG: 50 TABLET ORAL at 22:48

## 2024-11-28 RX ADMIN — LEVOFLOXACIN 750 MG: 5 INJECTION, SOLUTION INTRAVENOUS at 23:34

## 2024-11-28 ASSESSMENT — PAIN - FUNCTIONAL ASSESSMENT
PAIN_FUNCTIONAL_ASSESSMENT: ACTIVITIES ARE NOT PREVENTED
PAIN_FUNCTIONAL_ASSESSMENT: ACTIVITIES ARE NOT PREVENTED
PAIN_FUNCTIONAL_ASSESSMENT: 0-10

## 2024-11-28 ASSESSMENT — LIFESTYLE VARIABLES
HOW OFTEN DO YOU HAVE A DRINK CONTAINING ALCOHOL: NEVER
HOW MANY STANDARD DRINKS CONTAINING ALCOHOL DO YOU HAVE ON A TYPICAL DAY: PATIENT DOES NOT DRINK

## 2024-11-28 ASSESSMENT — PAIN DESCRIPTION - PAIN TYPE: TYPE: ACUTE PAIN

## 2024-11-28 ASSESSMENT — PAIN SCALES - GENERAL
PAINLEVEL_OUTOF10: 6
PAINLEVEL_OUTOF10: 7

## 2024-11-28 ASSESSMENT — PAIN DESCRIPTION - DESCRIPTORS
DESCRIPTORS: ACHING
DESCRIPTORS: ACHING

## 2024-11-28 ASSESSMENT — PAIN DESCRIPTION - ORIENTATION
ORIENTATION: LEFT
ORIENTATION: LEFT

## 2024-11-28 ASSESSMENT — PAIN DESCRIPTION - LOCATION
LOCATION: FLANK
LOCATION: FLANK

## 2024-11-29 VITALS
TEMPERATURE: 97.3 F | OXYGEN SATURATION: 97 % | SYSTOLIC BLOOD PRESSURE: 135 MMHG | DIASTOLIC BLOOD PRESSURE: 74 MMHG | WEIGHT: 246.8 LBS | RESPIRATION RATE: 18 BRPM | HEART RATE: 87 BPM | BODY MASS INDEX: 38.74 KG/M2 | HEIGHT: 67 IN

## 2024-11-29 PROBLEM — R10.9 LEFT FLANK PAIN: Status: ACTIVE | Noted: 2024-11-29

## 2024-11-29 LAB
ANION GAP SERPL CALCULATED.3IONS-SCNC: 8 MMOL/L (ref 9–17)
BASOPHILS # BLD: 0 K/UL (ref 0–0.2)
BASOPHILS NFR BLD: 0 % (ref 0–1)
BUN SERPL-MCNC: 16 MG/DL (ref 6–20)
BUN/CREAT SERPL: 16 (ref 9–20)
CALCIUM SERPL-MCNC: 8.9 MG/DL (ref 8.6–10.4)
CHLORIDE SERPL-SCNC: 105 MMOL/L (ref 98–107)
CO2 SERPL-SCNC: 26 MMOL/L (ref 20–31)
CREAT SERPL-MCNC: 1 MG/DL (ref 0.5–0.9)
EOSINOPHIL # BLD: 0 K/UL (ref 0–0.4)
EOSINOPHILS RELATIVE PERCENT: 0 % (ref 1–7)
ERYTHROCYTE [DISTWIDTH] IN BLOOD BY AUTOMATED COUNT: 20.6 % (ref 11.8–14.4)
GFR, ESTIMATED: 71 ML/MIN/1.73M2
GLUCOSE SERPL-MCNC: 96 MG/DL (ref 70–99)
HCT VFR BLD AUTO: 19.9 % (ref 36.3–47.1)
HCT VFR BLD AUTO: 21.4 % (ref 36.3–47.1)
HCT VFR BLD AUTO: 24.5 % (ref 36.3–47.1)
HGB BLD-MCNC: 6.6 G/DL (ref 11.9–15.1)
HGB BLD-MCNC: 7.5 G/DL (ref 11.9–15.1)
HGB BLD-MCNC: 8.4 G/DL (ref 11.9–15.1)
IMM GRANULOCYTES # BLD AUTO: 0 K/UL (ref 0–0.3)
IMM GRANULOCYTES NFR BLD: 0 %
LYMPHOCYTES NFR BLD: 0.7 K/UL (ref 1–4.8)
LYMPHOCYTES RELATIVE PERCENT: 44 % (ref 16–46)
MCH RBC QN AUTO: 31.6 PG (ref 25.2–33.5)
MCHC RBC AUTO-ENTMCNC: 33.2 G/DL (ref 25.2–33.5)
MCV RBC AUTO: 95.2 FL (ref 82.6–102.9)
MONOCYTES NFR BLD: 0.26 K/UL (ref 0.1–1.2)
MONOCYTES NFR BLD: 16 % (ref 4–11)
MORPHOLOGY: ABNORMAL
NEUTROPHILS NFR BLD: 40 % (ref 43–77)
NEUTS SEG NFR BLD: 0.64 K/UL (ref 1.5–8.1)
NRBC BLD-RTO: 0 PER 100 WBC
PLATELET # BLD AUTO: 83 K/UL (ref 138–453)
PMV BLD AUTO: 9.1 FL (ref 8.1–13.5)
POTASSIUM SERPL-SCNC: 3.7 MMOL/L (ref 3.7–5.3)
RBC # BLD AUTO: 2.09 M/UL (ref 3.95–5.11)
SODIUM SERPL-SCNC: 139 MMOL/L (ref 135–144)
WBC OTHER # BLD: 1.6 K/UL (ref 3.5–11.3)

## 2024-11-29 PROCEDURE — P9016 RBC LEUKOCYTES REDUCED: HCPCS

## 2024-11-29 PROCEDURE — 94760 N-INVAS EAR/PLS OXIMETRY 1: CPT

## 2024-11-29 PROCEDURE — 99238 HOSP IP/OBS DSCHRG MGMT 30/<: CPT | Performed by: FAMILY MEDICINE

## 2024-11-29 PROCEDURE — 80048 BASIC METABOLIC PNL TOTAL CA: CPT

## 2024-11-29 PROCEDURE — 99233 SBSQ HOSP IP/OBS HIGH 50: CPT | Performed by: FAMILY MEDICINE

## 2024-11-29 PROCEDURE — 36415 COLL VENOUS BLD VENIPUNCTURE: CPT

## 2024-11-29 PROCEDURE — 6370000000 HC RX 637 (ALT 250 FOR IP): Performed by: FAMILY MEDICINE

## 2024-11-29 PROCEDURE — 6370000000 HC RX 637 (ALT 250 FOR IP)

## 2024-11-29 PROCEDURE — 36430 TRANSFUSION BLD/BLD COMPNT: CPT

## 2024-11-29 PROCEDURE — 85018 HEMOGLOBIN: CPT

## 2024-11-29 PROCEDURE — 85025 COMPLETE CBC W/AUTO DIFF WBC: CPT

## 2024-11-29 PROCEDURE — 85014 HEMATOCRIT: CPT

## 2024-11-29 RX ORDER — NICOTINE 21 MG/24HR
1 PATCH, TRANSDERMAL 24 HOURS TRANSDERMAL DAILY PRN
Status: DISCONTINUED | OUTPATIENT
Start: 2024-11-29 | End: 2024-11-29 | Stop reason: HOSPADM

## 2024-11-29 RX ORDER — GABAPENTIN 300 MG/1
300 CAPSULE ORAL
Status: DISCONTINUED | OUTPATIENT
Start: 2024-11-29 | End: 2024-11-29 | Stop reason: HOSPADM

## 2024-11-29 RX ORDER — ACETAMINOPHEN 325 MG/1
650 TABLET ORAL EVERY 6 HOURS PRN
Status: DISCONTINUED | OUTPATIENT
Start: 2024-11-29 | End: 2024-11-29 | Stop reason: HOSPADM

## 2024-11-29 RX ORDER — TRAMADOL HYDROCHLORIDE 50 MG/1
50 TABLET ORAL EVERY 6 HOURS PRN
Status: DISCONTINUED | OUTPATIENT
Start: 2024-11-29 | End: 2024-11-29 | Stop reason: HOSPADM

## 2024-11-29 RX ORDER — SODIUM CHLORIDE 0.9 % (FLUSH) 0.9 %
5-40 SYRINGE (ML) INJECTION EVERY 12 HOURS SCHEDULED
Status: DISCONTINUED | OUTPATIENT
Start: 2024-11-29 | End: 2024-11-29 | Stop reason: HOSPADM

## 2024-11-29 RX ORDER — SODIUM CHLORIDE 0.9 % (FLUSH) 0.9 %
5-40 SYRINGE (ML) INJECTION PRN
Status: DISCONTINUED | OUTPATIENT
Start: 2024-11-29 | End: 2024-11-29 | Stop reason: HOSPADM

## 2024-11-29 RX ORDER — SODIUM CHLORIDE 9 MG/ML
INJECTION, SOLUTION INTRAVENOUS PRN
Status: DISCONTINUED | OUTPATIENT
Start: 2024-11-29 | End: 2024-11-29 | Stop reason: HOSPADM

## 2024-11-29 RX ORDER — ACETAMINOPHEN 650 MG/1
650 SUPPOSITORY RECTAL EVERY 6 HOURS PRN
Status: DISCONTINUED | OUTPATIENT
Start: 2024-11-29 | End: 2024-11-29 | Stop reason: HOSPADM

## 2024-11-29 RX ORDER — VALACYCLOVIR HYDROCHLORIDE 500 MG/1
500 TABLET, FILM COATED ORAL ONCE
Status: COMPLETED | OUTPATIENT
Start: 2024-11-29 | End: 2024-11-29

## 2024-11-29 RX ORDER — TRAMADOL HYDROCHLORIDE 50 MG/1
100 TABLET ORAL EVERY 6 HOURS PRN
Status: DISCONTINUED | OUTPATIENT
Start: 2024-11-29 | End: 2024-11-29 | Stop reason: HOSPADM

## 2024-11-29 RX ORDER — CALCIUM CARBONATE 500 MG/1
500 TABLET, CHEWABLE ORAL 3 TIMES DAILY PRN
Status: DISCONTINUED | OUTPATIENT
Start: 2024-11-29 | End: 2024-11-29 | Stop reason: HOSPADM

## 2024-11-29 RX ORDER — POLYETHYLENE GLYCOL 3350 17 G/17G
17 POWDER, FOR SOLUTION ORAL DAILY PRN
Status: DISCONTINUED | OUTPATIENT
Start: 2024-11-29 | End: 2024-11-29 | Stop reason: HOSPADM

## 2024-11-29 RX ORDER — VALACYCLOVIR HYDROCHLORIDE 500 MG/1
500 TABLET, FILM COATED ORAL DAILY
Status: DISCONTINUED | OUTPATIENT
Start: 2024-11-29 | End: 2024-11-29 | Stop reason: HOSPADM

## 2024-11-29 RX ORDER — PROCHLORPERAZINE MALEATE 5 MG/1
10 TABLET ORAL EVERY 8 HOURS PRN
Status: DISCONTINUED | OUTPATIENT
Start: 2024-11-29 | End: 2024-11-29 | Stop reason: HOSPADM

## 2024-11-29 RX ORDER — PANTOPRAZOLE SODIUM 40 MG/1
40 TABLET, DELAYED RELEASE ORAL
Status: DISCONTINUED | OUTPATIENT
Start: 2024-11-29 | End: 2024-11-29 | Stop reason: HOSPADM

## 2024-11-29 RX ORDER — ONDANSETRON 2 MG/ML
4 INJECTION INTRAMUSCULAR; INTRAVENOUS EVERY 6 HOURS PRN
Status: DISCONTINUED | OUTPATIENT
Start: 2024-11-29 | End: 2024-11-29 | Stop reason: HOSPADM

## 2024-11-29 RX ORDER — LEVOFLOXACIN 500 MG/1
500 TABLET, FILM COATED ORAL DAILY
Qty: 7 TABLET | Refills: 0 | Status: SHIPPED | OUTPATIENT
Start: 2024-11-29 | End: 2024-12-06

## 2024-11-29 RX ORDER — BENZONATATE 100 MG/1
100 CAPSULE ORAL 3 TIMES DAILY PRN
Status: DISCONTINUED | OUTPATIENT
Start: 2024-11-29 | End: 2024-11-29 | Stop reason: HOSPADM

## 2024-11-29 RX ADMIN — PANTOPRAZOLE SODIUM 40 MG: 40 TABLET, DELAYED RELEASE ORAL at 10:40

## 2024-11-29 RX ADMIN — TRAMADOL HYDROCHLORIDE 100 MG: 50 TABLET ORAL at 05:46

## 2024-11-29 RX ADMIN — VALACYCLOVIR HYDROCHLORIDE 500 MG: 500 TABLET, FILM COATED ORAL at 13:51

## 2024-11-29 RX ADMIN — SERTRALINE 50 MG: 50 TABLET, FILM COATED ORAL at 02:13

## 2024-11-29 RX ADMIN — PROCHLORPERAZINE MALEATE 10 MG: 5 TABLET ORAL at 05:37

## 2024-11-29 RX ADMIN — VALACYCLOVIR HYDROCHLORIDE 500 MG: 500 TABLET, FILM COATED ORAL at 02:13

## 2024-11-29 RX ADMIN — BENZONATATE 100 MG: 100 CAPSULE ORAL at 02:13

## 2024-11-29 RX ADMIN — ANTACID TABLETS 500 MG: 500 TABLET, CHEWABLE ORAL at 10:40

## 2024-11-29 RX ADMIN — SERTRALINE 50 MG: 50 TABLET, FILM COATED ORAL at 09:18

## 2024-11-29 ASSESSMENT — PAIN DESCRIPTION - LOCATION: LOCATION: ABDOMEN;FLANK

## 2024-11-29 ASSESSMENT — PAIN SCALES - GENERAL
PAINLEVEL_OUTOF10: 7
PAINLEVEL_OUTOF10: 5
PAINLEVEL_OUTOF10: 5

## 2024-11-29 ASSESSMENT — PAIN DESCRIPTION - DESCRIPTORS: DESCRIPTORS: THROBBING;DISCOMFORT

## 2024-11-29 ASSESSMENT — ENCOUNTER SYMPTOMS
VOMITING: 0
SHORTNESS OF BREATH: 0
NAUSEA: 1

## 2024-11-29 ASSESSMENT — PAIN DESCRIPTION - ORIENTATION: ORIENTATION: LEFT

## 2024-11-29 ASSESSMENT — PAIN - FUNCTIONAL ASSESSMENT: PAIN_FUNCTIONAL_ASSESSMENT: ACTIVITIES ARE NOT PREVENTED

## 2024-11-29 NOTE — H&P
HOSPITALIST ADMISSION H&P      REASON FOR ADMISSION:  Nephrostomy associated UTI, Symptomatic anemia, Neutropenia.  ESTIMATED LENGTH OF STAY:>2 midnights, 3-4 days    ATTENDING/ADMITTING PHYSICIAN: Mayco Ely MD  PCP: Miranda Sofia MD    HISTORY OF PRESENT ILLNESS:      The patient is a 44 y.o. female patient of Miranda Sofia MD who presents from ER with c/o left kidney pain. Patient reports that last evening she developed left side kidney pain that worsened today to the point that she \"had trouble sitting\" due to the pain. Patient denies nausea, vomiting, diarrhea, constipation, chest pain, or shortness of breath. Does report she had some discomfort with urinating this am, as she does urinate a little in the morning due to left sided nephrostomy bag is usually full in the morning.      Wounds and LDAs present prior to admission: Bilateral nephrostomy tubes, Rt subclavian port.     See below for additional PMH.    Patient toep-omsxyjpjqf-axbugkxd-available records reviewed, including, but not limited to ER records, imaging results, lab results, office records, personal records, and [place]AR[place] --  7 Prescriptions, 3 Private pay, 4 Prescribers, 3 Pharmacies in 2 years, Opioid risk score 26, increased 20 points in last 6 months .     Past Medical History:   Diagnosis Date    Cancer (HCC)     14 years ago per patient    Cervical cancer (HCC)     Hydronephrosis     anna    Obstructive uropathy     Uterine mass            Past Surgical History:   Procedure Laterality Date    CHOLECYSTECTOMY      CYSTOSCOPY N/A 05/20/2024    CYSTOSCOPY RETROGRADE PYELOGRAM, BLADDER BIOPSY performed by Ludin Barbosa MD at [facility] OR    CYSTOSCOPY N/A 10/16/2024    Cystoscopy performed by Enmanuel Crowell MD at [facility] OR    CYSTOSCOPY W/ RETROGRADES  05/20/2024    CYSTOSCOPY RETROGRADE PYELOGRAM, BLADDER BIOPSY    DILATION AND CURETTAGE OF UTERUS N/A 05/20/2024    DILATATION AND CURETTAGE HYSTEROSCOPY, VAGINAL / CERVIX. BIOPSY performed by  MODERATE 11/28/2024 09:10 PM    LEUKOCYTESUR 2+ 11/28/2024 09:10 PM    UROBILINOGEN Normal 11/28/2024 09:10 PM    BILIRUBINUR NEGATIVE 11/28/2024 09:10 PM    GLUCOSEU NEGATIVE 11/28/2024 09:10 PM    AMORPHOUS NOT REPORTED 06/13/2019 11:37 PM     Lactic acid 0.9, Blood cultures pending x2, Urine culture pending.       ASSESSMENT:      Patient Active Problem List   Diagnosis    Pelvic pain    Uterine mass    Thickened endometrium    Primary cervical squamous cell carcinoma (HCC)    Abnormal uterine bleeding    Sepsis due to urinary tract infection (HCC)    Lesion of cervix    Lymphadenopathy    Enlarged uterus    Hydroureter, right    Hydroureter on left    Uterine abscess    Obstructive uropathy    Uterine fistula    Bacteremia due to Clostridium species    Bandemia    SIRS (systemic inflammatory response syndrome) (HCC)    Other hydronephrosis    LESLIE (acute kidney injury) ([facility])    Major depressive disorder, recurrent, mild    Major depressive disorder, recurrent, moderate    Major depressive disorder, recurrent, unspecified    Iron deficiency    Malfunction of nephrostomy tube (HCC)    Pyelonephritis of left kidney    Thrombocytopenia, unspecified (HCC)    Urinary tract infection associated with nephrostomy catheter (HCC)    Symptomatic anemia    Neutropenia (HCC)       PLAN:    1. UTI: Levaquin pending C&S, I&O, Nephrostomy care per protocol, Monitor labs.  2. Symptomatic anemia: PRBC to keep Hgb>/=8, Monitor labs, No anticoagulants.   3. Neutropenia: Neutropenic precautions, Monitor labs, Hem/Onc consult as needed.  Daily weight, VS per unit protocol, EPC's.  Home medications reviewed  See orders     Note that over 55 minutes was spent in evaluation of the patient, review of the chart and pertinent records, discussion with family/staff, etc    FERNANDO Amezquita NP    11:31 PM  11/28/2024

## 2024-11-29 NOTE — CONSENT
Informed Consent for Blood Component Transfusion Note    I have discussed with the patient the rationale for blood component transfusion; its benefits in treating or preventing fatigue, organ damage, or death; and its risk which includes mild transfusion reactions, rare risk of blood borne infection, or more serious but rare reactions. I have discussed the alternatives to transfusion, including the risk and consequences of not receiving transfusion. The patient had an opportunity to ask questions and had agreed to proceed with transfusion of blood components.    Electronically signed by Danika Holliday MD on 11/28/24 at 11:50 PM EST

## 2024-11-29 NOTE — PLAN OF CARE
Problem: Discharge Planning  Goal: Discharge to home or other facility with appropriate resources  11/29/2024 1004 by Keira Boone RN  Outcome: Progressing  11/29/2024 0048 by Екатерина Cook RN  Outcome: Progressing     Problem: Pain  Goal: Verbalizes/displays adequate comfort level or baseline comfort level  11/29/2024 1004 by Keira Boone RN  Outcome: Progressing  11/29/2024 0048 by Екатерина Cook RN  Outcome: Progressing     Problem: ABCDS Injury Assessment  Goal: Absence of physical injury  11/29/2024 1004 by Keira Boone RN  Outcome: Progressing  11/29/2024 0048 by Екатерина Cook RN  Outcome: Progressing     Problem: Safety - Adult  Goal: Free from fall injury  11/29/2024 1004 by Keira Boone RN  Outcome: Progressing  11/29/2024 0048 by Екатерина Cook RN  Outcome: Progressing     Problem: Hematologic - Adult  Goal: Maintains hematologic stability  11/29/2024 1004 by Keira Boone RN  Outcome: Progressing  11/29/2024 0048 by Екатерина Cook RN  Reactivated

## 2024-11-29 NOTE — ED NOTES
ED Report    Presents to ED from: Home    Chief Complaint   Patient presents with    Flank Pain     No diagnosis found.  Present Condition: Stable  Pertinent Event(s): Pt arrives to ED with c/o \"left kidney pain\" that has been ongoing for the past couple days.  Pt has bilateral nephrostomy tubes and states in the past when she has had pain like this she typically has a UTI.  Pt denies any abdominal pain, nausea or vomiting.     LOC:  A+O x 4  Code Status: FULL    Vital Signs   Vitals:    11/28/24 2101 11/28/24 2145   BP: (!) 143/74    Pulse: (!) 110 88   Resp: 20 20   Temp: 98.5 °F (36.9 °C)    TempSrc: Tympanic    SpO2: 100% 99%   Weight: 106.6 kg (235 lb)    Height: 1.702 m (5' 7\")      Oxygen Baseline: Room Air       Current Oxygen Needs: Room Air  Mobility: Independent       Mobility Aide: Independent    LDAs:   Implantable Port 06/19/24 Right Subclavian (Active)       Peripheral IV 11/28/24 Right Antecubital (Active)   Site Assessment Clean, dry & intact 11/28/24 2240   Line Status Blood return noted;Normal saline locked;Specimen collected 11/28/24 2240   Phlebitis Assessment No symptoms 11/28/24 2240   Infiltration Assessment 0 11/28/24 2240   Dressing Status New dressing applied;Clean, dry & intact 11/28/24 2240   Dressing Type Transparent 11/28/24 2240   Dressing Intervention New 11/28/24 2240       Nephrostomy Tube Left Flank 10 fr (Active)       Nephrostomy Tube Right Flank 10 fr (Active)     Abnormal ED Labs:    Labs Reviewed   CBC WITH AUTO DIFFERENTIAL - Abnormal; Notable for the following components:       Result Value    WBC 1.7 (*)     RBC 1.85 (*)     Hemoglobin 6.1 (*)     Hematocrit 18.2 (*)     RDW 20.6 (*)     Platelets 104 (*)     Monocytes % 27 (*)     Immature Granulocytes % 1 (*)     Neutrophils Absolute 0.66 (*)     Lymphocytes Absolute 0.52 (*)     All other components within normal limits   BASIC METABOLIC PANEL - Abnormal; Notable for the following components:    Anion Gap 8 (*)

## 2024-11-29 NOTE — CARE COORDINATION
Case Management Assessment  Initial Evaluation    Date/Time of Evaluation: 11/29/2024 9:27 AM  Assessment Completed by: Danny Braun RN    If patient is discharged prior to next notation, then this note serves as note for discharge by case management.    Date / Time: 11/28/2024  8:59 PM  Patient Name: Evangelina Marquis                     YOB: 1980  Diagnosis: Urinary tract infection associated with nephrostomy catheter, initial encounter ([facility]) [T83.512A, N39.0]                         Patient Admission Status: Inpatient   Readmission Risk (Low < 19, Mod (19-27), High > 27): Readmission Risk Score: 19.5    Current PCP: Miranda Sofia MD  Last Visit:      Chart Reviewed: Yes      History Provided by: Patient  Patient Orientation: Alert and Oriented    Patient Cognition: Alert    Hospitalization in the last 30 days (Readmission):  No           Advance Directives:      Code Status: Full Code   Patient's Primary Decision Maker is:      Primary Decision Maker: cary marquis - Spouse - [phone]    Discharge Planning:    Patient lives with: Spouse/Significant Other, Children   Type of Home: House  Primary Care Giver: Self ( and home care)  Patient Support Systems include: Spouse/Significant Other, Children, Home Care Staff   Current Financial resources: None  Current community resources: ECF/Home Care  Current services prior to admission: Home Care (interim) for nursing services and PT            Current DME: Rollator (transport wheelchair)            Type of Home Care services:  Nursing Services    ADLS  Prior functional level: Independent in ADLs/IADLs  Current functional level: Independent in ADLs/IADLs    Family can provide assistance at DC: Yes  Would you like Case Management to discuss the discharge plan with any other family members/significant others, and if so, who? No ( at bedside)  Plans to Return to Present Housing: Yes  Other Identified Issues/Barriers to RETURNING to current

## 2024-11-29 NOTE — DISCHARGE SUMMARY
Discharge Summary    Evangelina King Patient Status:  Inpatient    1980 MRN [id number]   Location [facility]  PROGRESSIVE CARE Attending Mayco Ely MD   Hosp Day # 1 PCP Miranda Sofia MD     Date of Admission: 2024    Date of Discharge: 2024    Admitting Diagnosis: Urinary tract infection associated with nephrostomy catheter, initial encounter ([facility]) [T83.512A, N39.0]    Discharge Diagnosis:   Symptomatic anemia.  UTI with bilateral nephrostomy in place  Cervical cancer with obstructive uropathy      Reason for Admission/HPI: Pain left back area with h/o uterine cancer with obstructive uropathy with bilateral nephrostomy in place.Has had symptomatic anemia requiring transfusion.    Hospital Course: Patient was on IV levaquin for UTI.Received 3 units for PRBC for symptomatic anemia to keep Hgb above 8.0 per oncology recommendations.Patient was feeling clinically improved and wanted to be discharged home her hgb was up to 8.4.Has remained afebrile prilminary culture negative so far.Urine culture was pending patient will be notified if needed any change in antibiotics based on C & S.Advised follow up with oncology and PCP .    Consultations: None    Procedures: Blood transfusion    Complications: None    Disposition: Home    Discharge Condition: Good    Discharge Medications:      Medication List        START taking these medications      levoFLOXacin 500 MG tablet  Commonly known as: LEVAQUIN  Take 1 tablet by mouth daily for 7 days     omeprazole 20 MG delayed release capsule  Commonly known as: PRILOSEC  Take 1 capsule by mouth every morning (before breakfast)            CONTINUE taking these medications      acetaminophen 500 MG tablet  Commonly known as: TYLENOL  Take 2 tablets by mouth every 6 hours as needed for Pain     benzonatate 100 MG capsule  Commonly known as: TESSALON  Take 1 capsule by mouth 3 times daily as needed for Cough     gabapentin 300 MG capsule  Commonly known as:

## 2024-11-29 NOTE — ED PROVIDER NOTES
[facility]  [facility]  [address] [address]  Phone: [phone]  eMERGENCY dEPARTMENT eNCOUnter      Pt Name: Evangelina King  MRN: [id number]  Birthdate 1980  Date of evaluation: 11/29/24      CHIEF COMPLAINT     Chief Complaint   Patient presents with    Flank Pain       HISTORY OF PRESENT ILLNESS    Evangelina King is a 44 y.o. female who presents today for evaluation of left flank pain.  Patient has a history of invasive squamous cell cervical cancer and obstructive uropathy.  She has indwelling bilateral nephrostomy tubes.  Patient has completed previous radiation and just finished 6 rounds of chemotherapy.  She indicates that she does not have any associated chest pain or shortness of breath.  She developed the flank pain that is progressed today.  She has her normal amount of nausea no active vomiting.  She had noticed some sediment in her nephrostomy tube a few days ago and has been monitoring this closely.  She was traveling so feels that she drink less fluid than she normally does today.  She has good output in both urostomy bags which are similar to her baseline.  She indicates that she recently required a blood transfusion with her chemotherapy treatment due to anemia.  She indicates that she has had similar symptoms before when she had a kidney infection.  Her urostomy tube dressings are changed frequently and she has noticed any associated rashes problem around the area.  Patient is immunosuppressed given chemotherapy status.    REVIEW OF SYSTEMS       Review of Systems   Constitutional:  Negative for fever.   Respiratory:  Negative for shortness of breath.    Cardiovascular:  Negative for chest pain.   Gastrointestinal:  Positive for nausea. Negative for vomiting.   Genitourinary:  Positive for flank pain.   Skin:  Negative for rash.   Neurological:  Negative for syncope.        PAST MEDICAL HISTORY    has a past medical history of Cancer (HCC), Cervical cancer (HCC),

## 2024-11-29 NOTE — PLAN OF CARE
Problem: Hematologic - Adult  Goal: Maintains hematologic stability  Reactivated     Problem: Safety - Adult  Goal: Free from fall injury  Outcome: Progressing     Problem: ABCDS Injury Assessment  Goal: Absence of physical injury  Outcome: Progressing     Problem: Pain  Goal: Verbalizes/displays adequate comfort level or baseline comfort level  Outcome: Progressing     Problem: Discharge Planning  Goal: Discharge to home or other facility with appropriate resources  Outcome: Progressing

## 2024-11-30 ENCOUNTER — FOLLOWUP TELEPHONE ENCOUNTER (OUTPATIENT)
Dept: INPATIENT UNIT | Age: 44
End: 2024-11-30

## 2024-11-30 LAB
ABO/RH: NORMAL
ANTIBODY SCREEN: NEGATIVE
ARM BAND NUMBER: NORMAL
BLOOD BANK BLOOD PRODUCT EXPIRATION DATE: NORMAL
BLOOD BANK COMMENT: NORMAL
BLOOD BANK DISPENSE STATUS: NORMAL
BLOOD BANK ISBT PRODUCT BLOOD TYPE: 600
BLOOD BANK PRODUCT CODE: NORMAL
BLOOD BANK SAMPLE EXPIRATION: NORMAL
BLOOD BANK UNIT TYPE AND RH: NORMAL
BPU ID: NORMAL
COMPONENT: NORMAL
CROSSMATCH RESULT: NORMAL
TRANSFUSION STATUS: NORMAL
UNIT DIVISION: 0
UNIT ISSUE DATE/TIME: NORMAL

## 2024-12-01 LAB
MICROORGANISM SPEC CULT: NORMAL
SERVICE CMNT-IMP: NORMAL
SERVICE CMNT-IMP: NORMAL
SPECIMEN DESCRIPTION: NORMAL

## 2024-12-02 ENCOUNTER — CARE COORDINATION (OUTPATIENT)
Dept: CARE COORDINATION | Age: 44
End: 2024-12-02

## 2024-12-02 ENCOUNTER — TELEPHONE (OUTPATIENT)
Dept: INTERNAL MEDICINE | Age: 44
End: 2024-12-02

## 2024-12-02 NOTE — TELEPHONE ENCOUNTER
Care Transitions Initial Follow Up Call    Outreach made within 2 business days of discharge: Yes    Patient: Evangelina King Patient : 1980   MRN: [id number]  Reason for Admission: UTI  Discharge Date: 24       Spoke with: patient     Discharge department/facility: [facility]     TCM Interactive Patient Contact:  Was patient able to fill all prescriptions: Yes  Was patient instructed to bring all medications to the follow-up visit: Yes  Is patient taking all medications as directed in the discharge summary? Yes  Does patient understand their discharge instructions: Yes  Does patient have questions or concerns that need addressed prior to 7-14 day follow up office visit: no    Additional needs identified to be addressed with provider  No needs identified             Scheduled appointment with PCP within 7-14 days    Follow Up  Future Appointments   Date Time Provider Department Center   12/3/2024 11:00 AM Tera Lomeli MD [facility]   2024  8:00 AM Enmanuel Crowell MD [facility]   2024  1:00 PM STV INTERVENT RM 1 STV[facility] SPECIAL STV Radiolog   12/10/2024  2:40 PM Miranda Sofia MD [facility] ECC DEP   2024  2:30 PM Jewell Clayton MD GYN Oncology TOP   2024  2:00 PM MD MED ONC FAST TRACK CHAIR 1 [facility] MED ONC [place]   1/15/2025  9:20 AM Enmanuel Crowell MD [facility]       Sue Paulino MA

## 2024-12-02 NOTE — CARE COORDINATION
Ambulatory Care Coordination Note     12/2/2024 10:20 AM     Patient outreach attempt by this AC today for hospital F/U call and to perform care management follow up . ACM was unable to reach the patient by telephone today;   left voice message requesting a return phone call to this ACM.     ACM: Katerine Vogel RN     Care Summary Note:     She has:          Cervical squamous cell carcinoma, anna nephrostomy tubes in place- on medications follows with radiation oncology, oncology,  urology and PCP    [facility] IP 11/28-29/2024- UTI. Anemia      Interim  care- nursing, PT/OT     Plan of Care : Continue assessments, education and support                          RPM- N/A                          F/U oncology, nephrostomy tubes, HH care    Complete Hospital F/U call    12/2/2024- 10:22 am  Left HIPAA compliant voice message requesting return call @ [phone].  Included apt details for 12/3 and 12/4/2024         PCP/Specialist follow up:   Future Appointments         Provider Specialty Dept Phone    12/3/2024 11:00 AM Tera Lomeli MD Oncology [phone]    12/4/2024 8:00 AM Enmanuel Crowell MD Urology [phone]    12/6/2024  1:00 PM (Arrive by 11:30 AM) Radiologist, Stv Interventional; STV INTERVENT RN POOL; STV INTERVENT  1 Radiology Special Procedures [phone]    12/10/2024 2:40 PM Miranda Sofia MD Internal Medicine [phone]    12/12/2024 2:30 PM Jewell Clayton MD Gynecologic Oncology [phone]    12/19/2024 2:00 PM [facility] MED ONC FAST TRACK CHAIR 1 Infusion Therapy [phone]    1/15/2025 9:20 AM Enmanuel Crowell MD Urology [phone]            Follow Up:   Plan for next [facility] outreach in approximately 1-2 days  to complete:  - education   - hospital follow up.             done

## 2024-12-03 ENCOUNTER — CARE COORDINATION (OUTPATIENT)
Dept: CARE COORDINATION | Age: 44
End: 2024-12-03

## 2024-12-03 ENCOUNTER — TELEMEDICINE (OUTPATIENT)
Dept: ONCOLOGY | Age: 44
End: 2024-12-03

## 2024-12-03 DIAGNOSIS — Z51.11 CHEMOTHERAPY MANAGEMENT, ENCOUNTER FOR: ICD-10-CM

## 2024-12-03 DIAGNOSIS — C53.9 PRIMARY CERVICAL SQUAMOUS CELL CARCINOMA (HCC): Primary | ICD-10-CM

## 2024-12-03 NOTE — CARE COORDINATION
Ambulatory Care Coordination Note     12/3/2024 9:07 AM     Patient Current Location:  Home: [address] [place] [address] [place] [address]     ACM contacted the patient by telephone.       ACM: Katerine Vogel, RN     Care Summary Note:     She has:          Cervical squamous cell carcinoma, anna nephrostomy tubes in place- on medications follows with radiation oncology, oncology,  urology and PCP     [facility] IP 11/28-29/2024- UTI. Anemia      Interim  care- nursing, PT/OT     Plan of Care : Continue assessments, education and support                          RPM- N/A                          F/U oncology, nephrostomy tubes, HH care                          Complete Hospital F/U call     12/2/2024- 10:22 am  Left HIPAA compliant voice message requesting return call @ [phone].  Included apt details for 12/3 and 12/4/2024  12/3/2024- per chart review- office staff completed hospital F/U call.    9:08 am called and spoke with Evangelina.  nurse in home. She has apts scheduled. She stated she would return call.     PCP/Specialist follow up:   Future Appointments         Provider Specialty Dept Phone    12/3/2024 11:00 AM Tera Lomeli MD Oncology [phone]    12/4/2024 8:00 AM Enmanuel Crowell MD Urology [phone]    12/6/2024  1:00 PM (Arrive by 11:30 AM) Radiologist, Stv Interventional; STV INTERVENT RN POOL; STV INTERVENT RM 1 Radiology Special Procedures [phone]    12/10/2024 1:40 PM Miranda Sofia MD Internal Medicine [phone]    12/12/2024 2:30 PM Jewell Clayton MD Gynecologic Oncology [phone]    12/19/2024 2:00 PM [facility] MED ONC FAST TRACK CHAIR 1 Infusion Therapy [phone]    1/15/2025 9:20 AM Enmanuel Crowell MD Urology [phone]            Follow Up:   Plan for next ACM outreach in approximately 1 week to complete:  - goal progression.

## 2024-12-03 NOTE — PATIENT INSTRUCTIONS
Follow-up and further chemo depending upon evaluation by gynecological oncology and urology evaluation coming up later this month

## 2024-12-03 NOTE — PROGRESS NOTES
Evangelina King                                                                                                                  12/3/2024  MRN:   [id number]  YOB: 1980  PCP:                           Miranda Sofia MD  Referring Physician: No ref. 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  She presented to an [facility] for evaluation of abnormal uterine bleeding for 2 years with associated foul-smelling, black vaginal discharge for 6 weeks and associated urinary frequency.  She was also noted to have fevers at this time.  Imaging at [facility] showed a thickened endometrium and hydronephrosis.  She was transferred to [place] for GYN evaluation for concern for malignancy with thickened endometrium and hydronephrosis.     She had an EUA, vaginoscopy, endometrial/cervical biopsies, cystoscopy, bladder biopsy on 5/20/2024  IR has been consulted for bilateral PERC neph tubes  case discussed with GYN-ONC , they suspect endometrial versus cervical cancer. Not a surgical candidate   Pt just back from [facility], she is in a lot of pain, hard to get much information     Interim History:  Patient seen in clinic via virtual visit.  Patient status post 6 cycles.  CT PET overall shows

## 2024-12-04 ENCOUNTER — OFFICE VISIT (OUTPATIENT)
Dept: UROLOGY | Age: 44
End: 2024-12-04

## 2024-12-04 VITALS
HEART RATE: 101 BPM | SYSTOLIC BLOOD PRESSURE: 122 MMHG | RESPIRATION RATE: 16 BRPM | WEIGHT: 240 LBS | OXYGEN SATURATION: 96 % | BODY MASS INDEX: 37.67 KG/M2 | DIASTOLIC BLOOD PRESSURE: 84 MMHG | HEIGHT: 67 IN

## 2024-12-04 DIAGNOSIS — N13.30 BILATERAL HYDRONEPHROSIS: Primary | ICD-10-CM

## 2024-12-04 DIAGNOSIS — R19.00 PELVIC MASS IN FEMALE: ICD-10-CM

## 2024-12-04 NOTE — PROGRESS NOTES
Component Value Date    BUN 16 11/29/2024     Lab Results   Component Value Date    CREATININE 1.0 (H) 11/29/2024       Imaging Reviewed during this Office Visit:   (results were independently reviewed by physician and radiology report verified)  I independently reviewed and verified the images and reports from:    No results found.      PAST MEDICAL, FAMILY AND SOCIAL HISTORY:  Past Medical History:   Diagnosis Date    Cancer (HCC)     14 years ago per patient    Cervical cancer (HCC)     Hydronephrosis     anna    Obstructive uropathy     Uterine mass      Past Surgical History:   Procedure Laterality Date    CHOLECYSTECTOMY      CYSTOSCOPY N/A 05/20/2024    CYSTOSCOPY RETROGRADE PYELOGRAM, BLADDER BIOPSY performed by Ludin Barbosa MD at [facility] OR    CYSTOSCOPY N/A 10/16/2024    Cystoscopy performed by Enmanuel Crowell MD at [facility] OR    CYSTOSCOPY W/ RETROGRADES  05/20/2024    CYSTOSCOPY RETROGRADE PYELOGRAM, BLADDER BIOPSY    DILATION AND CURETTAGE OF UTERUS N/A 05/20/2024    DILATATION AND CURETTAGE HYSTEROSCOPY, VAGINAL / CERVIX. BIOPSY performed by Jewell Clayton MD at [facility] OR    IR NEPHROSTOMY CATHETER PLACEMENT Bilateral 09/26/2024    IR Tube change    IR NEPHROSTOMY PERCUTANEOUS LEFT  05/21/2024    IR NEPHROSTOMY PERCUTANEOUS LEFT 5/21/2024 Daniel Gonsalez MD [facility] SPECIAL PROCEDURES    IR NEPHROSTOMY PERCUTANEOUS RIGHT  05/21/2024    IR NEPHROSTOMY PERCUTANEOUS RIGHT 5/21/2024 Daniel Gonsalez MD [facility] SPECIAL PROCEDURES    IR PORT PLACEMENT EQUAL OR GREATER THAN 5 YEARS  06/19/2024    IR PORT PLACEMENT EQUAL OR GREATER THAN 5 YEARS 6/19/2024 Ishan Butterfield MD [facility] SPECIAL PROCEDURES    US LYMPH NODE BIOPSY  06/19/2024    US LYMPH NODE BIOPSY 6/19/2024 [facility] ULTRASOUND     Family History   Adopted: Yes   Problem Relation Age of Onset    Lung Cancer Maternal Grandfather     Breast Cancer Other     Cancer Maternal Uncle      Outpatient Medications Marked as Taking for the 12/4/24

## 2024-12-05 ENCOUNTER — TELEPHONE (OUTPATIENT)
Dept: UROLOGY | Age: 44
End: 2024-12-05

## 2024-12-05 NOTE — TELEPHONE ENCOUNTER
Cliff from [facility] called, said patient had to put bags back on neph tubes. She had vomited couple of times, denies nausea, fever/chills. Did have some flank pain. The right nephtube was draining while the left was dribbling. Patient is voiding. Message to dr wu to address call cliff back at [phone]

## 2024-12-05 NOTE — TELEPHONE ENCOUNTER
Spoke with dr wu, said for patient to cap the right side. If does well for 6hours she can cap the left side.

## 2024-12-06 ENCOUNTER — HOSPITAL ENCOUNTER (OUTPATIENT)
Dept: INTERVENTIONAL RADIOLOGY/VASCULAR | Age: 44
Discharge: HOME | End: 2024-12-06

## 2024-12-10 ENCOUNTER — OFFICE VISIT (OUTPATIENT)
Dept: INTERNAL MEDICINE | Age: 44
End: 2024-12-10

## 2024-12-10 ENCOUNTER — CARE COORDINATION (OUTPATIENT)
Dept: CARE COORDINATION | Age: 44
End: 2024-12-10

## 2024-12-10 VITALS
DIASTOLIC BLOOD PRESSURE: 74 MMHG | OXYGEN SATURATION: 99 % | WEIGHT: 241 LBS | BODY MASS INDEX: 37.83 KG/M2 | HEART RATE: 78 BPM | HEIGHT: 67 IN | SYSTOLIC BLOOD PRESSURE: 130 MMHG | RESPIRATION RATE: 16 BRPM

## 2024-12-10 DIAGNOSIS — Z09 HOSPITAL DISCHARGE FOLLOW-UP: Primary | ICD-10-CM

## 2024-12-10 RX ORDER — FERROUS SULFATE 325(65) MG
325 TABLET ORAL DAILY
COMMUNITY

## 2024-12-10 NOTE — CARE COORDINATION
Ambulatory Care Coordination Note     12/10/2024 9:59 AM     Patient Current Location:  Home: [address] [place], [place] [address] [place] [address]     ACM contacted the patient by telephone.       ACM: Katerine Vogel RN    Care Summary Note:     Plan of Care : Completion of ACM     Spoke with Evangelina. She is scheduled with PCP this afternoon.   She has \"worked everything out with Dr Crowell\" in regards to nephrology tubes.   Reviewed My Chart, Walk In [facility] and [facility].   She has resources. She has apts scheduled. She was in agreement to reaching out to this writer if needs arise. This writer will no longer be calling.     PCP/Specialist follow up:   Future Appointments         Provider Specialty Dept Phone    12/10/2024 1:40 PM Miranda Sofia MD Internal Medicine [phone]    12/12/2024 2:30 PM Jewell Clayton MD Gynecologic Oncology [phone]    12/18/2024 8:20 AM Enmanuel Crowell MD Urology [phone]    12/19/2024 2:00 PM [facility] MED ONC FAST TRACK CHAIR 1 Infusion Therapy [phone]    1/15/2025 9:20 AM Enmanuel Crowell MD Urology [phone]            Follow Up:   No further Ambulatory Care Management follow-up scheduled at this time.  Patient  has Ambulatory Care Manager's contact information for any further questions, concerns or needs.

## 2024-12-10 NOTE — PROGRESS NOTES
Post-Discharge Transitional Care Follow Up      Evangelina King   YOB: 1980    Date of Office Visit:  12/10/2024  Date of Hospital Admission: 11/28/24  Date of Hospital Discharge: 11/29/24  Readmission Risk Score (high >=14%. Medium >=10%):Readmission Risk Score: 18.9      Care management risk score Rising risk (score 2-5) and Complex Care (Scores >=6): No Risk Score On File     Non face to face  following discharge, date last encounter closed (first attempt may have been earlier): 12/02/2024     Call initiated 2 business days of discharge: Yes     Hospital discharge follow-up  -     [place] DISCHARGE MEDS RECONCILED W/ CURRENT OUTPATIENT MED LIST    Medical Decision Making: moderate complexity  No follow-ups on file.           Subjective:   HPI    Inpatient course: Discharge summary reviewed- see chart.    Interval history/Current status: Presents to follow-up after treatment in the hospital for UTI and anemia.  She has a history of cervical cancer, and follows with oncology as well as gynecological oncology.  She presented to the hospital with flank pain, was found to have a UTI, however she was also found to have hemoglobin around 6, and therefore was admitted to the hospital for transfusion.  It seems that her hemoglobin improved to 8.4 on discharge.  And her infection has resolved.  She has no symptoms at this time.  Denies dysuria, hematuria.  She has bilateral nephrostomy tubes, but now is urinating via her bladder and urethra.    Patient Active Problem List   Diagnosis    Pelvic pain    Uterine mass    Thickened endometrium    Primary cervical squamous cell carcinoma (HCC)    Abnormal uterine bleeding    Sepsis due to urinary tract infection (HCC)    Lesion of cervix    Lymphadenopathy    Enlarged uterus    Hydroureter, right    Hydroureter on left    Uterine abscess    Obstructive uropathy    Uterine fistula    Bacteremia due to Clostridium species    Bandemia    SIRS (systemic inflammatory

## 2024-12-12 ENCOUNTER — OFFICE VISIT (OUTPATIENT)
Dept: GYNECOLOGIC ONCOLOGY | Age: 44
End: 2024-12-12

## 2024-12-12 VITALS
BODY MASS INDEX: 38.06 KG/M2 | WEIGHT: 243 LBS | OXYGEN SATURATION: 97 % | TEMPERATURE: 97.1 F | HEART RATE: 92 BPM | SYSTOLIC BLOOD PRESSURE: 138 MMHG | DIASTOLIC BLOOD PRESSURE: 78 MMHG

## 2024-12-12 DIAGNOSIS — Z92.21 HISTORY OF CHEMOTHERAPY: ICD-10-CM

## 2024-12-12 DIAGNOSIS — Z23 NEED FOR HPV VACCINE: ICD-10-CM

## 2024-12-12 DIAGNOSIS — Z23 NEED FOR COVID-19 VACCINE: ICD-10-CM

## 2024-12-12 DIAGNOSIS — C53.9 PRIMARY CERVICAL SQUAMOUS CELL CARCINOMA (HCC): Primary | ICD-10-CM

## 2024-12-12 DIAGNOSIS — E66.01 OBESITY, CLASS III, BMI 40-49.9 (MORBID OBESITY): ICD-10-CM

## 2024-12-12 DIAGNOSIS — Z23 NEED FOR DIPHTHERIA-TETANUS-PERTUSSIS (TDAP) VACCINE: ICD-10-CM

## 2024-12-12 DIAGNOSIS — Z23 INFLUENZA VACCINE NEEDED: ICD-10-CM

## 2024-12-12 DIAGNOSIS — Z92.3 HISTORY OF RADIATION THERAPY: ICD-10-CM

## 2024-12-12 ASSESSMENT — ENCOUNTER SYMPTOMS
CHEST TIGHTNESS: 0
ABDOMINAL DISTENTION: 0
SCLERAL ICTERUS: 0
VOMITING: 0
COUGH: 0
CONSTIPATION: 0
RECTAL PAIN: 0
SORE THROAT: 0
SHORTNESS OF BREATH: 0
DIARRHEA: 0
WHEEZING: 0
BLOOD IN STOOL: 0
BACK PAIN: 0
HEMOPTYSIS: 0
TROUBLE SWALLOWING: 0
NAUSEA: 0
EYE PROBLEMS: 0
ABDOMINAL PAIN: 0
VOICE CHANGE: 0

## 2024-12-12 NOTE — PROGRESS NOTES
Review of Systems   Constitutional:  Negative for appetite change, chills, diaphoresis, fatigue, fever and unexpected weight change.   HENT:   Negative for hearing loss, lump/mass, mouth sores, nosebleeds, sore throat, tinnitus, trouble swallowing and voice change.    Eyes:  Negative for eye problems and icterus.   Respiratory:  Negative for chest tightness, cough, hemoptysis, shortness of breath and wheezing.    Cardiovascular:  Negative for chest pain, leg swelling and palpitations.   Gastrointestinal:  Negative for abdominal distention, abdominal pain, blood in stool, constipation, diarrhea, nausea, rectal pain and vomiting.   Endocrine: Positive for hot flashes.   Genitourinary:  Negative for bladder incontinence, difficulty urinating, dyspareunia, dysuria, frequency, hematuria, menstrual problem, nocturia, pelvic pain, vaginal bleeding and vaginal discharge.         Bilat nephrostomies are capped at this time.  Peeing without issues   Musculoskeletal:  Negative for arthralgias, back pain, flank pain, gait problem, myalgias, neck pain and neck stiffness.   Skin:  Negative for itching, rash and wound.   Neurological:  Negative for dizziness, extremity weakness, gait problem, headaches, light-headedness, numbness, seizures and speech difficulty.   Hematological:  Negative for adenopathy. Bruises/bleeds easily (Bruise easily).   Psychiatric/Behavioral:  Positive for depression. Negative for confusion, decreased concentration, sleep disturbance and suicidal ideas. The patient is nervous/anxious.        
the examination and / or evaluation, independent interpretation of findings, patient counseling / teaching, face to face discussion with the patient, ordering tests, referring and communicating with other health care professionals, coordination of care and chart completion on the day of service (F2F START TIME: 1500. F2F STOP TIME: 1630)      Scribe Attestation:   By signing my name below, ISerafin, attest that this documentation has been prepared under the direction and in the presence of Jewell Clayton MD.  Electronically Signed: SERAFNI NAIR. 12/12/24 3:17 PM     Jewell LUND MD, personally performed the services described in this documentation as scribed, in my presence, and it is both accurate and complete.      Jewell Clayton MD  Gynecologic Oncology  
records. A discussion was held regarding Evangelina King's diagnosis and plan of care. She verbalized a good understanding.    {There are no diagnoses linked to this encounter. (Refresh or delete this SmartLink)}     *** PUT A PLAN ***          #. Health Maintenance:  *** Not up to date OR Up to date ***  *** FOR EXAMPLE. DELETE AND / OR ADJUST WHAT IS NOT NEEDED ***  The patient has been asked to follow up with her PCP  Bone density scan for follow up on osteoporosis is due now.  Tdap, Shingles, Pneumonia and Influenza vaccines are not UTD  HgBA1C either not done or not available for review    DISPOSITION:     The patient verbalized understanding of our extensive discussion today and of follow up instructions. All questions were answered to her apparent satisfaction. Therefore, she agreed to proceed as planned.   *** Return for surgical management at [facility]. The patient will be informed once the date and time of the surgery has been established.  [Return to the office in 1 year or sooner for any abnormal symptoms. Return precautions have been reviewed.]      TIME SUMMARY:  60 minutes F2F time  has been coded  Total non-billable time spent: *** minutes.  In total, *** minutes included records review, independent interpretation of findings, ordering medications, tests or procedures, referring and communicating with other health care professionals, coordination of care and chart completion outside of the date service (DATES: *** and ***. *** minutes on each date)  In total, *** minutes included records review, obtaining history, performing the examination and / or evaluation, independent interpretation of findings, patient counseling / teaching, face to face discussion with the patient, ordering medications, ordering tests, ordering procedures, referring and communicating with other health care professionals, coordination of care and chart completion on the day of service (F2F START

## 2024-12-13 ENCOUNTER — TELEPHONE (OUTPATIENT)
Dept: ONCOLOGY | Age: 44
End: 2024-12-13

## 2024-12-13 NOTE — TELEPHONE ENCOUNTER
Name: Evangelina King  : 1980  MRN: [id number]    Oncology Navigation Follow-Up Note    Contact Type:  Telephone    Notes: Spoke w/Dr. Lomeli to inquire on tx plan & f/u.  Dr. Lomeli stated spoke w/Dr. Clayton, pt to complete MRI pelvis & [facility] consultation.  Dr. Lomeli stated f/u to be scheduled once imaging & [facility] consultation completed.  Spoke w/pt updated on conversation w/Dr. Lomeli.  Pt stated will contact scheduling now to schedule MRI pelvis.  Instructed pt writer will follow & encouraged to contact writer prn.  Will continue to follow.      Electronically signed by Jessa Skaggs RN on 2024 at 11:43 AM

## 2024-12-16 ENCOUNTER — TELEPHONE (OUTPATIENT)
Dept: ONCOLOGY | Age: 44
End: 2024-12-16

## 2024-12-16 ENCOUNTER — TELEPHONE (OUTPATIENT)
Dept: GYNECOLOGIC ONCOLOGY | Age: 44
End: 2024-12-16

## 2024-12-16 ENCOUNTER — HOSPITAL ENCOUNTER (OUTPATIENT)
Dept: INFUSION THERAPY | Age: 44
Discharge: HOME | End: 2024-12-16
Payer: COMMERCIAL

## 2024-12-16 DIAGNOSIS — N13.30 BILATERAL HYDRONEPHROSIS: ICD-10-CM

## 2024-12-16 DIAGNOSIS — C53.9 PRIMARY CERVICAL SQUAMOUS CELL CARCINOMA (HCC): Primary | ICD-10-CM

## 2024-12-16 LAB
ANION GAP SERPL CALCULATED.3IONS-SCNC: 10 MMOL/L (ref 9–17)
BUN SERPL-MCNC: 19 MG/DL (ref 6–20)
BUN/CREAT SERPL: 21 (ref 9–20)
CALCIUM SERPL-MCNC: 8.7 MG/DL (ref 8.6–10.4)
CHLORIDE SERPL-SCNC: 103 MMOL/L (ref 98–107)
CO2 SERPL-SCNC: 27 MMOL/L (ref 20–31)
CREAT SERPL-MCNC: 0.9 MG/DL (ref 0.5–0.9)
GFR, ESTIMATED: 80 ML/MIN/1.73M2
GLUCOSE SERPL-MCNC: 118 MG/DL (ref 70–99)
POTASSIUM SERPL-SCNC: 3.8 MMOL/L (ref 3.7–5.3)
SODIUM SERPL-SCNC: 140 MMOL/L (ref 135–144)

## 2024-12-16 PROCEDURE — 80048 BASIC METABOLIC PNL TOTAL CA: CPT

## 2024-12-16 PROCEDURE — 6360000002 HC RX W HCPCS: Performed by: INTERNAL MEDICINE

## 2024-12-16 PROCEDURE — 36591 DRAW BLOOD OFF VENOUS DEVICE: CPT

## 2024-12-16 PROCEDURE — 2580000003 HC RX 258: Performed by: INTERNAL MEDICINE

## 2024-12-16 RX ORDER — HEPARIN 100 UNIT/ML
500 SYRINGE INTRAVENOUS PRN
Status: DISCONTINUED | OUTPATIENT
Start: 2024-12-16 | End: 2024-12-17 | Stop reason: HOSPADM

## 2024-12-16 RX ORDER — SODIUM CHLORIDE 9 MG/ML
25 INJECTION, SOLUTION INTRAVENOUS PRN
OUTPATIENT
Start: 2024-12-16

## 2024-12-16 RX ORDER — SODIUM CHLORIDE 0.9 % (FLUSH) 0.9 %
5-40 SYRINGE (ML) INJECTION PRN
Status: DISCONTINUED | OUTPATIENT
Start: 2024-12-16 | End: 2024-12-17 | Stop reason: HOSPADM

## 2024-12-16 RX ORDER — HEPARIN 100 UNIT/ML
500 SYRINGE INTRAVENOUS PRN
OUTPATIENT
Start: 2024-12-16

## 2024-12-16 RX ORDER — SODIUM CHLORIDE 0.9 % (FLUSH) 0.9 %
5-40 SYRINGE (ML) INJECTION PRN
OUTPATIENT
Start: 2024-12-16

## 2024-12-16 RX ADMIN — HEPARIN 500 UNITS: 100 SYRINGE at 15:55

## 2024-12-16 RX ADMIN — SODIUM CHLORIDE, PRESERVATIVE FREE 10 ML: 5 INJECTION INTRAVENOUS at 15:55

## 2024-12-16 NOTE — PROGRESS NOTES
Patient arrived for Port lab draw and flush.  Port accessed without complication.  Lab draw off of port access.  Patient tolerated well.  Patient left infusion center with all belongings and steady gate.  New appt set for 6 weeks.

## 2024-12-16 NOTE — TELEPHONE ENCOUNTER
Name: Evangelina King  : 1980  MRN: [id number]    Oncology Navigation Follow-Up Note    Contact Type:  Telephone    Notes: Upon review of chart noted pt scheduled  for MRI pelvis.  Upon review of [facility] care everywhere noted pt not registered @ [facility].  Will continue to follow.      Electronically signed by Jessa Skaggs RN on 2024 at 2:29 PM

## 2024-12-16 NOTE — TELEPHONE ENCOUNTER
Spoke with patient and she confirmed she is scheduled for [facility] Dr. Brown appt 12/20/24. She confirms MRI appt on 12/26/24.

## 2024-12-18 ENCOUNTER — OFFICE VISIT (OUTPATIENT)
Dept: UROLOGY | Age: 44
End: 2024-12-18

## 2024-12-18 VITALS
RESPIRATION RATE: 14 BRPM | HEIGHT: 67 IN | WEIGHT: 243 LBS | HEART RATE: 109 BPM | SYSTOLIC BLOOD PRESSURE: 128 MMHG | DIASTOLIC BLOOD PRESSURE: 82 MMHG | OXYGEN SATURATION: 99 % | BODY MASS INDEX: 38.14 KG/M2

## 2024-12-18 DIAGNOSIS — R19.00 PELVIC MASS IN FEMALE: ICD-10-CM

## 2024-12-18 DIAGNOSIS — N13.30 BILATERAL HYDRONEPHROSIS: Primary | ICD-10-CM

## 2024-12-18 NOTE — PROGRESS NOTES
Enmanuel Crowell MD  Urology Clinic office visit      Patient:  Evangelina King  YOB: 1980  Date: 12/18/2024    HISTORY OF PRESENT ILLNESS:   The patient is a 44 y.o. female who presents today for evaluation of the following problems:      1. Bilateral hydronephrosis    2. Pelvic mass in female         Overall the problem(s) : are worsening.  Associated Symptoms: No dysuria, gross hematuria.  Pain Severity:      Summary of old records: N/A  (Patient's old records, notes and chart reviewed and summarized above.)    5/2024    44-year-old female  Concern of cervical cancer with invasion into the bladder  Bilateral hydronephrosis  Status post cystoscopy and unable to place stents 5/20/2024  Findings: Invading malignancy on the trigone, unable to identify ureteral orifices but urine effflux was seen     Had nephrostomy tubes placed bilaterally  Has been getting tubes exchanged every 6-8 weeks; will make sure to arrange this schedule  She requests for anesthesia for these exchanges  Can do it at [facility] facility for anesthesia (propofol)          Last chemo was 11/14/2024    Cystoscopy 10/2024: Findings: mild inflammatory changes within mucosa. No tumors noted. Small capcity bladder.     Cr reviewed 11/2024  Hgb 11/2024    I independently reviewed and verified the images and reports from:  11/26/2024 PET  IMPRESSION:  Findings are compatible with at least partial treatment response as compared  to exam dated 06/04/2024.  There has been significant reduction of activity  within the uterus and abdominopelvic lymph nodes.     No findings of FDG avid metastatic disease within the chest.        CT CHEST ABDOMEN PELVIS W CONTRAST Additional Contrast? Oral  Result Date: 9/16/2024  1.  Interval decreased size of multiple enlarged pelvic lymph nodes compatible with a partial response to therapy. 2.  Redemonstration of endometrial thickening up to 1.9 cm, with air within the endometrial cavity that may relate to

## 2024-12-23 ENCOUNTER — TELEPHONE (OUTPATIENT)
Dept: INTERNAL MEDICINE | Age: 44
End: 2024-12-23

## 2024-12-23 DIAGNOSIS — C53.0 MALIGNANT NEOPLASM OF ENDOCERVIX (HCC): Primary | ICD-10-CM

## 2024-12-23 DIAGNOSIS — C53.9 PRIMARY CERVICAL SQUAMOUS CELL CARCINOMA (HCC): ICD-10-CM

## 2024-12-23 NOTE — TELEPHONE ENCOUNTER
Is she sure she wants to go to [place]? I saw that she saw the gynecologist in [facility], if she wants to keep everything at [facility], then she might be able to ask the gynecologist for referral to the oncologist there.  Or I can place another referral to [facility] myself, but I am not sure if it will go through.

## 2024-12-23 NOTE — TELEPHONE ENCOUNTER
Evangelina calling to say she was referred to [facility] by Dr Lomeli, and was told by  that their office \"did everything wrong\". Evangelina is very upset and would like referral to [facility]ematology. Their fax number is [phone]. She didn't know if Dr Sofia can do this or if she has to call Dr Lomeli's office to do it.  Please advise and call Evangelina back @[phone]

## 2024-12-26 ENCOUNTER — TELEPHONE (OUTPATIENT)
Dept: INTERNAL MEDICINE | Age: 44
End: 2024-12-26

## 2024-12-26 ENCOUNTER — HOSPITAL ENCOUNTER (OUTPATIENT)
Dept: MRI IMAGING | Age: 44
Discharge: HOME | End: 2024-12-28
Payer: COMMERCIAL

## 2024-12-26 DIAGNOSIS — C53.9 PRIMARY CERVICAL SQUAMOUS CELL CARCINOMA (HCC): ICD-10-CM

## 2024-12-26 PROCEDURE — 6360000004 HC RX CONTRAST MEDICATION: Performed by: OBSTETRICS & GYNECOLOGY

## 2024-12-26 PROCEDURE — A9577 INJ MULTIHANCE: HCPCS | Performed by: OBSTETRICS & GYNECOLOGY

## 2024-12-26 PROCEDURE — 72197 MRI PELVIS W/O & W/DYE: CPT

## 2024-12-26 RX ADMIN — GADOBENATE DIMEGLUMINE 20 ML: 529 INJECTION, SOLUTION INTRAVENOUS at 11:10

## 2024-12-26 NOTE — TELEPHONE ENCOUNTER
Pathology report, PET Ct Scan and Urology OV note was faxed. She currently has an MRI of Abd Pelvis w & w/o that is still in process that I will fax once it has been reported on.

## 2024-12-26 NOTE — TELEPHONE ENCOUNTER
Ellie from [facility] Hemotology called requesting medical records containing Pathology reports, Radiology images and reports, any treatment patient has received,etc. by 2 p.m. today for her appt tomorrow.

## 2025-01-06 ENCOUNTER — TELEPHONE (OUTPATIENT)
Dept: ONCOLOGY | Age: 45
End: 2025-01-06

## 2025-01-06 NOTE — TELEPHONE ENCOUNTER
Name: Evangelina Knig  : 1980  MRN: [id number]    Oncology Navigation Follow-Up Note    Contact Type:  Telephone    Notes: Spoke w/pt to f/u on [facility] consultation.  Pt stated transferring care to [facility] r/t care given @  incorrect per [facility] MD & [facility] MD.  Inquired on assistance transferring care.  Pt declined & stated [facility] MD assisting.  Instructed pt writer will end navigation & instructed to contact prn.  Dr. Lomeli updated.         Electronically signed by Jessa Skaggs RN on 2025 at 3:12 PM

## 2025-01-07 RX ORDER — SERTRALINE HYDROCHLORIDE 100 MG/1
100 TABLET, FILM COATED ORAL DAILY
Qty: 90 TABLET | Refills: 1 | Status: SHIPPED | OUTPATIENT
Start: 2025-01-07

## 2025-01-09 ENCOUNTER — TELEPHONE (OUTPATIENT)
Dept: GYNECOLOGIC ONCOLOGY | Age: 45
End: 2025-01-09

## 2025-01-09 NOTE — TELEPHONE ENCOUNTER
Writer called patient to confirm cancellation of appointment as notice of transfer of care. Patient confirmed transfer of care and wishes to cancel appointments moving forward. Patient expresses interest in speaking to MD. Will forward message.

## 2025-01-14 NOTE — TELEPHONE ENCOUNTER
Called patient to schedule video visit with Dr. Clayton. Patient stated that she will call the office back at a later date to schedule, and does not want to schedule at this moment.

## 2025-01-23 ENCOUNTER — TELEPHONE (OUTPATIENT)
Dept: INTERNAL MEDICINE | Age: 45
End: 2025-01-23

## 2025-01-23 DIAGNOSIS — T14.8XXA NERVE COMPRESSION: ICD-10-CM

## 2025-01-23 RX ORDER — GABAPENTIN 300 MG/1
CAPSULE ORAL
Qty: 90 CAPSULE | Refills: 2 | Status: SHIPPED | OUTPATIENT
Start: 2025-01-23 | End: 2025-04-23

## 2025-01-23 NOTE — TELEPHONE ENCOUNTER
[facility] home health called in stating that patient is requesting an increase in her gabapentin. She is requesting also to take one tablet in the morning and two tablets at night.    Please advise, patients uses [facility] pharmacy.

## 2025-01-27 ENCOUNTER — HOSPITAL ENCOUNTER (OUTPATIENT)
Dept: INFUSION THERAPY | Age: 45
Discharge: HOME | End: 2025-01-27
Payer: COMMERCIAL

## 2025-01-27 DIAGNOSIS — R19.00 PELVIC MASS IN FEMALE: ICD-10-CM

## 2025-01-27 DIAGNOSIS — N13.30 BILATERAL HYDRONEPHROSIS: ICD-10-CM

## 2025-01-27 DIAGNOSIS — C53.9 PRIMARY CERVICAL SQUAMOUS CELL CARCINOMA (HCC): Primary | ICD-10-CM

## 2025-01-27 LAB
ANION GAP SERPL CALCULATED.3IONS-SCNC: 10 MMOL/L (ref 9–17)
BUN SERPL-MCNC: 24 MG/DL (ref 6–20)
BUN/CREAT SERPL: 24 (ref 9–20)
CALCIUM SERPL-MCNC: 9.1 MG/DL (ref 8.6–10.4)
CHLORIDE SERPL-SCNC: 103 MMOL/L (ref 98–107)
CO2 SERPL-SCNC: 27 MMOL/L (ref 20–31)
CREAT SERPL-MCNC: 1 MG/DL (ref 0.5–0.9)
GFR, ESTIMATED: 71 ML/MIN/1.73M2
GLUCOSE SERPL-MCNC: 141 MG/DL (ref 70–99)
POTASSIUM SERPL-SCNC: 3.9 MMOL/L (ref 3.7–5.3)
SODIUM SERPL-SCNC: 140 MMOL/L (ref 135–144)

## 2025-01-27 PROCEDURE — 2500000003 HC RX 250 WO HCPCS: Performed by: INTERNAL MEDICINE

## 2025-01-27 PROCEDURE — 36591 DRAW BLOOD OFF VENOUS DEVICE: CPT

## 2025-01-27 PROCEDURE — 6360000002 HC RX W HCPCS: Performed by: INTERNAL MEDICINE

## 2025-01-27 PROCEDURE — 80048 BASIC METABOLIC PNL TOTAL CA: CPT

## 2025-01-27 RX ORDER — SODIUM CHLORIDE 0.9 % (FLUSH) 0.9 %
5-40 SYRINGE (ML) INJECTION PRN
Status: DISCONTINUED | OUTPATIENT
Start: 2025-01-27 | End: 2025-01-28 | Stop reason: HOSPADM

## 2025-01-27 RX ORDER — HEPARIN 100 UNIT/ML
500 SYRINGE INTRAVENOUS PRN
Status: DISCONTINUED | OUTPATIENT
Start: 2025-01-27 | End: 2025-01-28 | Stop reason: HOSPADM

## 2025-01-27 RX ORDER — SODIUM CHLORIDE 9 MG/ML
25 INJECTION, SOLUTION INTRAVENOUS PRN
OUTPATIENT
Start: 2025-01-27

## 2025-01-27 RX ORDER — HEPARIN 100 UNIT/ML
500 SYRINGE INTRAVENOUS PRN
OUTPATIENT
Start: 2025-01-27

## 2025-01-27 RX ORDER — SODIUM CHLORIDE 0.9 % (FLUSH) 0.9 %
5-40 SYRINGE (ML) INJECTION PRN
OUTPATIENT
Start: 2025-01-27

## 2025-01-27 RX ADMIN — HEPARIN 500 UNITS: 100 SYRINGE at 14:30

## 2025-01-27 RX ADMIN — SODIUM CHLORIDE, PRESERVATIVE FREE 10 ML: 5 INJECTION INTRAVENOUS at 14:30

## 2025-01-27 NOTE — PROGRESS NOTES
Patient arrived for Port lab draw.  Port flushed without complication.  Patient tolerated well.  Patient left infusion center with all belongings and steady gate.

## 2025-01-28 ENCOUNTER — ANESTHESIA (OUTPATIENT)
Dept: INTERVENTIONAL RADIOLOGY/VASCULAR | Age: 45
End: 2025-01-28
Payer: COMMERCIAL

## 2025-01-28 ENCOUNTER — ANESTHESIA EVENT (OUTPATIENT)
Dept: INTERVENTIONAL RADIOLOGY/VASCULAR | Age: 45
End: 2025-01-28
Payer: COMMERCIAL

## 2025-01-28 ENCOUNTER — HOSPITAL ENCOUNTER (OUTPATIENT)
Dept: INTERVENTIONAL RADIOLOGY/VASCULAR | Age: 45
Discharge: HOME | End: 2025-01-30
Payer: COMMERCIAL

## 2025-01-28 VITALS
WEIGHT: 243 LBS | SYSTOLIC BLOOD PRESSURE: 119 MMHG | OXYGEN SATURATION: 100 % | TEMPERATURE: 96.8 F | HEART RATE: 75 BPM | HEIGHT: 67 IN | RESPIRATION RATE: 16 BRPM | DIASTOLIC BLOOD PRESSURE: 76 MMHG | BODY MASS INDEX: 38.14 KG/M2

## 2025-01-28 DIAGNOSIS — N13.30 BILATERAL HYDRONEPHROSIS: ICD-10-CM

## 2025-01-28 PROCEDURE — 6360000002 HC RX W HCPCS

## 2025-01-28 PROCEDURE — 2580000003 HC RX 258

## 2025-01-28 PROCEDURE — 50389 REMOVE RENAL TUBE W/FLUORO: CPT

## 2025-01-28 PROCEDURE — 2500000003 HC RX 250 WO HCPCS

## 2025-01-28 RX ORDER — OXYCODONE HYDROCHLORIDE 5 MG/1
10 TABLET ORAL PRN
Status: ACTIVE | OUTPATIENT
Start: 2025-01-28 | End: 2025-01-28

## 2025-01-28 RX ORDER — LABETALOL HYDROCHLORIDE 5 MG/ML
10 INJECTION, SOLUTION INTRAVENOUS
Status: DISCONTINUED | OUTPATIENT
Start: 2025-01-28 | End: 2025-01-31 | Stop reason: HOSPADM

## 2025-01-28 RX ORDER — LIDOCAINE HYDROCHLORIDE 10 MG/ML
INJECTION, SOLUTION EPIDURAL; INFILTRATION; INTRACAUDAL; PERINEURAL
Status: DISCONTINUED | OUTPATIENT
Start: 2025-01-28 | End: 2025-01-28 | Stop reason: SDUPTHER

## 2025-01-28 RX ORDER — NALOXONE HYDROCHLORIDE 0.4 MG/ML
INJECTION, SOLUTION INTRAMUSCULAR; INTRAVENOUS; SUBCUTANEOUS PRN
Status: DISCONTINUED | OUTPATIENT
Start: 2025-01-28 | End: 2025-01-31 | Stop reason: HOSPADM

## 2025-01-28 RX ORDER — SODIUM CHLORIDE, SODIUM LACTATE, POTASSIUM CHLORIDE, CALCIUM CHLORIDE 600; 310; 30; 20 MG/100ML; MG/100ML; MG/100ML; MG/100ML
INJECTION, SOLUTION INTRAVENOUS
Status: DISCONTINUED | OUTPATIENT
Start: 2025-01-28 | End: 2025-01-28 | Stop reason: SDUPTHER

## 2025-01-28 RX ORDER — OXYCODONE HYDROCHLORIDE 5 MG/1
5 TABLET ORAL PRN
Status: ACTIVE | OUTPATIENT
Start: 2025-01-28 | End: 2025-01-28

## 2025-01-28 RX ORDER — LORAZEPAM 2 MG/ML
0.5 INJECTION INTRAMUSCULAR
Status: ACTIVE | OUTPATIENT
Start: 2025-01-28 | End: 2025-01-29

## 2025-01-28 RX ORDER — PROPOFOL 10 MG/ML
INJECTION, EMULSION INTRAVENOUS
Status: DISCONTINUED | OUTPATIENT
Start: 2025-01-28 | End: 2025-01-28 | Stop reason: SDUPTHER

## 2025-01-28 RX ORDER — MIDAZOLAM HYDROCHLORIDE 1 MG/ML
INJECTION, SOLUTION INTRAMUSCULAR; INTRAVENOUS
Status: DISCONTINUED | OUTPATIENT
Start: 2025-01-28 | End: 2025-01-28 | Stop reason: SDUPTHER

## 2025-01-28 RX ORDER — DIPHENHYDRAMINE HYDROCHLORIDE 50 MG/ML
12.5 INJECTION INTRAMUSCULAR; INTRAVENOUS
Status: ACTIVE | OUTPATIENT
Start: 2025-01-28 | End: 2025-01-29

## 2025-01-28 RX ORDER — DROPERIDOL 2.5 MG/ML
0.62 INJECTION, SOLUTION INTRAMUSCULAR; INTRAVENOUS
Status: ACTIVE | OUTPATIENT
Start: 2025-01-28 | End: 2025-01-29

## 2025-01-28 RX ORDER — HYDRALAZINE HYDROCHLORIDE 20 MG/ML
10 INJECTION INTRAMUSCULAR; INTRAVENOUS
Status: DISCONTINUED | OUTPATIENT
Start: 2025-01-28 | End: 2025-01-31 | Stop reason: HOSPADM

## 2025-01-28 RX ORDER — SODIUM CHLORIDE 0.9 % (FLUSH) 0.9 %
5-40 SYRINGE (ML) INJECTION PRN
Status: DISCONTINUED | OUTPATIENT
Start: 2025-01-28 | End: 2025-01-31 | Stop reason: HOSPADM

## 2025-01-28 RX ORDER — SODIUM CHLORIDE 0.9 % (FLUSH) 0.9 %
5-40 SYRINGE (ML) INJECTION EVERY 12 HOURS SCHEDULED
Status: DISCONTINUED | OUTPATIENT
Start: 2025-01-28 | End: 2025-01-31 | Stop reason: HOSPADM

## 2025-01-28 RX ORDER — FENTANYL CITRATE 50 UG/ML
25 INJECTION, SOLUTION INTRAMUSCULAR; INTRAVENOUS EVERY 5 MIN PRN
Status: DISCONTINUED | OUTPATIENT
Start: 2025-01-28 | End: 2025-01-31 | Stop reason: HOSPADM

## 2025-01-28 RX ORDER — PROCHLORPERAZINE EDISYLATE 5 MG/ML
5 INJECTION INTRAMUSCULAR; INTRAVENOUS
Status: ACTIVE | OUTPATIENT
Start: 2025-01-28 | End: 2025-01-29

## 2025-01-28 RX ORDER — SODIUM CHLORIDE 9 MG/ML
INJECTION, SOLUTION INTRAVENOUS PRN
Status: DISCONTINUED | OUTPATIENT
Start: 2025-01-28 | End: 2025-01-31 | Stop reason: HOSPADM

## 2025-01-28 RX ADMIN — PROPOFOL 20 MG: 10 INJECTION, EMULSION INTRAVENOUS at 11:53

## 2025-01-28 RX ADMIN — PROPOFOL 60 MG: 10 INJECTION, EMULSION INTRAVENOUS at 11:49

## 2025-01-28 RX ADMIN — PROPOFOL 30 MG: 10 INJECTION, EMULSION INTRAVENOUS at 11:51

## 2025-01-28 RX ADMIN — SODIUM CHLORIDE, POTASSIUM CHLORIDE, SODIUM LACTATE AND CALCIUM CHLORIDE: 600; 310; 30; 20 INJECTION, SOLUTION INTRAVENOUS at 11:38

## 2025-01-28 RX ADMIN — LIDOCAINE HYDROCHLORIDE 50 MG: 10 INJECTION, SOLUTION EPIDURAL; INFILTRATION; INTRACAUDAL; PERINEURAL at 11:48

## 2025-01-28 RX ADMIN — Medication 30 MG: at 11:48

## 2025-01-28 RX ADMIN — MIDAZOLAM 2 MG: 1 INJECTION INTRAMUSCULAR; INTRAVENOUS at 11:41

## 2025-01-28 ASSESSMENT — PAIN SCALES - GENERAL
PAINLEVEL_OUTOF10: 4

## 2025-01-28 NOTE — H&P
History and Physical    Pt Name: Evangelina King  MRN: [id number]  YOB: 1980  Date of evaluation: 1/28/2025  Primary Care Physician: Miranda Sofia MD    SUBJECTIVE:   History of Chief Complaint:    Evangelina King is a 44 y.o. female who is scheduled today for IR bilateral nephrostomy tube removal with anesthesia.  Patient was diagnosed with cervical cancer with bladder involvement in the spring and had nephrostomy tubes placed bilaterally in May 2024. She reports no longer draining/plugged and presents today for removal. Patient reports she is now following with hem/onc in [place] and resumed chemo last Friday and is scheduled again for 2/14/25 and 3/7/25.   Allergies  is allergic to morphine.  Medications  Prior to Admission medications    Medication Sig Start Date End Date Taking? Authorizing Provider   gabapentin (NEURONTIN) 300 MG capsule Take 1 capsule by mouth daily AND 2 capsules nightly. Do all this for 90 days. 1/23/25 4/23/25 Yes Miranda Sofia MD   sertraline (ZOLOFT) 100 MG tablet Take 1 tablet by mouth daily 1/7/25  Yes Miranda Sofia MD   ferrous sulfate (IRON 325) 325 (65 Fe) MG tablet Take 1 tablet by mouth daily   Yes ProviderSwathi MD   valACYclovir (VALTREX) 500 MG tablet Take 1 tablet by mouth daily 11/19/24  Yes Madison Valerio PA-C   prochlorperazine (COMPAZINE) 10 MG tablet Take 1 tablet by mouth every 8 hours as needed (nausea) 10/22/24  Yes Tera Lomeli MD   Psyllium (METAMUCIL PO) Take by mouth daily   Yes ProviderSwathi MD   magnesium hydroxide (MILK OF MAGNESIA) 400 MG/5ML suspension Take 30 mLs by mouth daily as needed for Constipation 7/30/24   Tera Lomeli MD   acetaminophen (TYLENOL) 500 MG tablet Take 2 tablets by mouth every 6 hours as needed for Pain 5/20/24 12/12/24  Mercy Villatoro, DO     Past Medical History    has a past medical history of Cancer (HCC), Cervical cancer (HCC), COVID-19, COVID-19 vaccine series not administered, History of chemotherapy,

## 2025-01-28 NOTE — BRIEF OP NOTE
Brief Postoperative Note    Evangelina King  YOB: 1980  [id number]    Pre-operative Diagnosis: Cervical CA    Post-operative Diagnosis: Same    Procedure: Bilateral nephrostomy removal    Anesthesia: MAC    Surgeons/Assistants: SYLVIA Quiroga    Estimated Blood Loss: 0 mL    Complications: None    Specimens: Was Obtained:     Findings: Successful removal of bilateral nephrostomy tubes.     Electronically signed by SYLVIA Quiroga on 1/28/2025 at 11:54 AM

## 2025-01-28 NOTE — ANESTHESIA POSTPROCEDURE EVALUATION
Department of Anesthesiology  Postprocedure Note    Patient: Evangelina King  MRN: [id number]  YOB: 1980  Date of evaluation: 1/28/2025    Procedure Summary       Date: 01/28/25 Room / Location: [facility]    Anesthesia Start: 1138 Anesthesia Stop: 1212    Procedure: IR REMOVAL NEPHROSTOMY TUBE W FLUORO Diagnosis:       Bilateral hydronephrosis      (bilateral neph removal with anesthesia)    Scheduled Providers: Radiologist, [facility] Interventional; Speedy Alexis MD Responsible Provider: Speedy Alexis MD    Anesthesia Type: TIVA, MAC ASA Status: 3            Anesthesia Type: No value filed.    Analia Phase I: Analia Score: 10    Analia Phase II:      Anesthesia Post Evaluation    Patient location during evaluation: PACU  Patient participation: complete - patient participated  Level of consciousness: awake and alert  Airway patency: patent  Nausea & Vomiting: no nausea and no vomiting  Cardiovascular status: blood pressure returned to baseline  Respiratory status: acceptable  Hydration status: euvolemic  Pain management: adequate    No notable events documented.

## 2025-01-28 NOTE — OR NURSING
Prone on table. Monitors and strap on. See CRNA for vitals and medications given during procedure. Back is prepped and draped.

## 2025-01-28 NOTE — ANESTHESIA PRE PROCEDURE
Department of Anesthesiology  Preprocedure Note       Name:  Evangelina King   Age:  44 y.o.  :  1980                                          MRN:  [id number]         Date:  2025      Surgeon: * No surgeons listed *    Procedure: * No procedures listed *    Medications prior to admission:   Prior to Admission medications    Medication Sig Start Date End Date Taking? Authorizing Provider   gabapentin (NEURONTIN) 300 MG capsule Take 1 capsule by mouth daily AND 2 capsules nightly. Do all this for 90 days. 25 Yes Miranda Sofia MD   sertraline (ZOLOFT) 100 MG tablet Take 1 tablet by mouth daily 25  Yes Miranda Sofia MD   ferrous sulfate (IRON 325) 325 (65 Fe) MG tablet Take 1 tablet by mouth daily   Yes ProviderSwathi MD   valACYclovir (VALTREX) 500 MG tablet Take 1 tablet by mouth daily 24  Yes Madison Valerio PA-C   prochlorperazine (COMPAZINE) 10 MG tablet Take 1 tablet by mouth every 8 hours as needed (nausea) 10/22/24  Yes Tera Lomeli MD   Psyllium (METAMUCIL PO) Take by mouth daily   Yes ProviderSwathi MD   magnesium hydroxide (MILK OF MAGNESIA) 400 MG/5ML suspension Take 30 mLs by mouth daily as needed for Constipation 24   Tera Lomeli MD   acetaminophen (TYLENOL) 500 MG tablet Take 2 tablets by mouth every 6 hours as needed for Pain 24  Mercy Villatoro,        Current medications:    Current Outpatient Medications   Medication Sig Dispense Refill   • gabapentin (NEURONTIN) 300 MG capsule Take 1 capsule by mouth daily AND 2 capsules nightly. Do all this for 90 days. 90 capsule 2   • sertraline (ZOLOFT) 100 MG tablet Take 1 tablet by mouth daily 90 tablet 1   • ferrous sulfate (IRON 325) 325 (65 Fe) MG tablet Take 1 tablet by mouth daily     • valACYclovir (VALTREX) 500 MG tablet Take 1 tablet by mouth daily 90 tablet 3   • prochlorperazine (COMPAZINE) 10 MG tablet Take 1 tablet by mouth every 8 hours as needed (nausea) 40 tablet 1   • Psyllium

## 2025-02-05 ENCOUNTER — OFFICE VISIT (OUTPATIENT)
Dept: UROLOGY | Age: 45
End: 2025-02-05
Payer: COMMERCIAL

## 2025-02-05 VITALS
WEIGHT: 249.8 LBS | SYSTOLIC BLOOD PRESSURE: 122 MMHG | BODY MASS INDEX: 39.21 KG/M2 | DIASTOLIC BLOOD PRESSURE: 76 MMHG | HEART RATE: 78 BPM | OXYGEN SATURATION: 98 % | HEIGHT: 67 IN | RESPIRATION RATE: 16 BRPM

## 2025-02-05 DIAGNOSIS — R19.00 PELVIC MASS IN FEMALE: ICD-10-CM

## 2025-02-05 DIAGNOSIS — N13.30 BILATERAL HYDRONEPHROSIS: Primary | ICD-10-CM

## 2025-02-05 PROCEDURE — 99213 OFFICE O/P EST LOW 20 MIN: CPT | Performed by: UROLOGY

## 2025-02-05 NOTE — PROGRESS NOTES
Component Value Date    BUN 24 (H) 01/27/2025     Lab Results   Component Value Date    CREATININE 1.0 (H) 01/27/2025       Imaging Reviewed during this Office Visit:   (results were independently reviewed by physician and radiology report verified)  I independently reviewed and verified the images and reports from:    No results found.      PAST MEDICAL, FAMILY AND SOCIAL HISTORY:  Past Medical History:   Diagnosis Date    Cancer (HCC)     14 years ago per patient    Cervical cancer (HCC)     COVID-19 2020    COVID-19 vaccine series not administered     History of chemotherapy     chem every 3 weeks keytuda infusions/rt chest port    Hydronephrosis     anna    Nephrostomy present (HCC)     tube    Obstructive uropathy     Uterine mass     Wears glasses      Past Surgical History:   Procedure Laterality Date    CHOLECYSTECTOMY      CYSTOSCOPY N/A 05/20/2024    CYSTOSCOPY RETROGRADE PYELOGRAM, BLADDER BIOPSY performed by Ludin Barbosa MD at [facility] OR    CYSTOSCOPY N/A 10/16/2024    Cystoscopy performed by Enmanuel Crowell MD at [facility] OR    CYSTOSCOPY W/ RETROGRADES  05/20/2024    CYSTOSCOPY RETROGRADE PYELOGRAM, BLADDER BIOPSY    DILATION AND CURETTAGE OF UTERUS N/A 05/20/2024    DILATATION AND CURETTAGE HYSTEROSCOPY, VAGINAL / CERVIX. BIOPSY performed by Jewell Clayton MD at [facility] OR    IR GUIDED NEPHROSTOMY CATH PLACEMENT Bilateral 09/26/2024    IR Tube change    IR GUIDED NEPHROSTOMY CATH PLACEMENT LEFT  05/21/2024    IR NEPHROSTOMY PERCUTANEOUS LEFT 5/21/2024 Daniel Gonsalez MD [facility] SPECIAL PROCEDURES    IR GUIDED NEPHROSTOMY CATH PLACEMENT RIGHT  05/21/2024    IR NEPHROSTOMY PERCUTANEOUS RIGHT 5/21/2024 Daniel Gonsalez MD [facility] SPECIAL PROCEDURES    IR PORT PLACEMENT > 5 YEARS  06/19/2024    IR PORT PLACEMENT EQUAL OR GREATER THAN 5 YEARS 6/19/2024 Ishan Butterfield MD [facility] SPECIAL PROCEDURES    US BIOPSY LYMPH NODE  06/19/2024    US LYMPH NODE BIOPSY 6/19/2024 [facility] ULTRASOUND     Family

## 2025-02-17 ENCOUNTER — OFFICE VISIT (OUTPATIENT)
Dept: INTERNAL MEDICINE | Age: 45
End: 2025-02-17
Payer: COMMERCIAL

## 2025-02-17 VITALS
RESPIRATION RATE: 16 BRPM | HEART RATE: 100 BPM | HEIGHT: 67 IN | SYSTOLIC BLOOD PRESSURE: 130 MMHG | WEIGHT: 245 LBS | OXYGEN SATURATION: 99 % | BODY MASS INDEX: 38.45 KG/M2 | DIASTOLIC BLOOD PRESSURE: 72 MMHG

## 2025-02-17 DIAGNOSIS — Z93.6 NEPHROSTOMY STATUS (HCC): ICD-10-CM

## 2025-02-17 DIAGNOSIS — C53.9 PRIMARY CERVICAL SQUAMOUS CELL CARCINOMA (HCC): ICD-10-CM

## 2025-02-17 DIAGNOSIS — F32.A DEPRESSION, UNSPECIFIED DEPRESSION TYPE: ICD-10-CM

## 2025-02-17 DIAGNOSIS — C53.0 MALIGNANT NEOPLASM OF ENDOCERVIX (HCC): Primary | ICD-10-CM

## 2025-02-17 PROCEDURE — 99214 OFFICE O/P EST MOD 30 MIN: CPT | Performed by: INTERNAL MEDICINE

## 2025-02-17 ASSESSMENT — PATIENT HEALTH QUESTIONNAIRE - PHQ9
9. THOUGHTS THAT YOU WOULD BE BETTER OFF DEAD, OR OF HURTING YOURSELF: NOT AT ALL
SUM OF ALL RESPONSES TO PHQ QUESTIONS 1-9: 5
10. IF YOU CHECKED OFF ANY PROBLEMS, HOW DIFFICULT HAVE THESE PROBLEMS MADE IT FOR YOU TO DO YOUR WORK, TAKE CARE OF THINGS AT HOME, OR GET ALONG WITH OTHER PEOPLE: NOT DIFFICULT AT ALL
7. TROUBLE CONCENTRATING ON THINGS, SUCH AS READING THE NEWSPAPER OR WATCHING TELEVISION: NOT AT ALL
1. LITTLE INTEREST OR PLEASURE IN DOING THINGS: NOT AT ALL
6. FEELING BAD ABOUT YOURSELF - OR THAT YOU ARE A FAILURE OR HAVE LET YOURSELF OR YOUR FAMILY DOWN: NOT AT ALL
DEPRESSION UNABLE TO ASSESS: FUNCTIONAL CAPACITY MOTIVATION LIMITS ACCURACY
SUM OF ALL RESPONSES TO PHQ QUESTIONS 1-9: 5
8. MOVING OR SPEAKING SO SLOWLY THAT OTHER PEOPLE COULD HAVE NOTICED. OR THE OPPOSITE, BEING SO FIGETY OR RESTLESS THAT YOU HAVE BEEN MOVING AROUND A LOT MORE THAN USUAL: NOT AT ALL
SUM OF ALL RESPONSES TO PHQ QUESTIONS 1-9: 5
5. POOR APPETITE OR OVEREATING: SEVERAL DAYS
2. FEELING DOWN, DEPRESSED OR HOPELESS: NOT AT ALL
SUM OF ALL RESPONSES TO PHQ QUESTIONS 1-9: 5
SUM OF ALL RESPONSES TO PHQ9 QUESTIONS 1 & 2: 0
3. TROUBLE FALLING OR STAYING ASLEEP: MORE THAN HALF THE DAYS
4. FEELING TIRED OR HAVING LITTLE ENERGY: MORE THAN HALF THE DAYS

## 2025-02-17 NOTE — PROGRESS NOTES
[facility] INTERNAL MEDICINE    Visit Date:  2/17/2025  Patient:  Evangelina King  YOB: 1980    Assessment & Plan    Thrombocytopenia: Possibly due to chemotherapy.  Will order CBC.    Depression: Continue Zoloft.  Reassess next visit.    Cervical squamous cell carcinoma.  Follows with oncology, and will make appointment with gynecology oncology.  On Keytruda at this time.    Nephrostomy tube status: Nephrostomy tubes recently removed.  Due to ureteral obstruction secondary to cervical cancer.  Follows with urology    Cancer related pain: Can continue Tylenol with codeine.  Reassess next visit.     Diagnosis Orders   1. Malignant neoplasm of endocervix (HCC)  Comprehensive Metabolic Panel    CBC with Auto Differential      2. Primary cervical squamous cell carcinoma (HCC)        3. Nephrostomy status (HCC)        4. Depression, unspecified depression type              Chief Complaint  2mth    History of Present Illness   Presents to follow-up.  She recently started seeing oncology in [place] oncology/hematology.  Says that she is currently on Keytruda.  Says that it makes her feel quite tired.  Denies fever, chills at this time.  She was recently found to have low platelets, and it seems that the oncologist might have wanted to repeat it, however she says that it is hard for her to go to [place] just for blood work.    Objective  /72 (Site: Left Upper Arm, Position: Sitting, Cuff Size: Large Adult)   Pulse 100   Resp 16   Ht 1.702 m (5' 7\")   Wt 111.1 kg (245 lb)   SpO2 99%   BMI 38.37 kg/m²   Constitutional: No acute distress.  Sits in chair comfortably  Eyes: Sclerae nonicteric. No lid lag or proptosis  HENT: External ears normal. No external lesions on the nose  Neck: No gross masses. Trachea visibly midline  Respiratory: Good air entry bilaterally.  No wheezing or crackles  Cardiovascular: Normal S1-S2.  No murmurs.  No lower extremity edema  Gastrointestinal: No

## 2025-02-23 ENCOUNTER — HOSPITAL ENCOUNTER (EMERGENCY)
Age: 45
Discharge: HOME | End: 2025-02-23
Attending: EMERGENCY MEDICINE
Payer: COMMERCIAL

## 2025-02-23 ENCOUNTER — APPOINTMENT (OUTPATIENT)
Dept: GENERAL RADIOLOGY | Age: 45
End: 2025-02-23
Attending: EMERGENCY MEDICINE
Payer: COMMERCIAL

## 2025-02-23 VITALS
WEIGHT: 245 LBS | DIASTOLIC BLOOD PRESSURE: 88 MMHG | OXYGEN SATURATION: 100 % | HEART RATE: 82 BPM | BODY MASS INDEX: 38.45 KG/M2 | TEMPERATURE: 98.1 F | RESPIRATION RATE: 16 BRPM | SYSTOLIC BLOOD PRESSURE: 137 MMHG | HEIGHT: 67 IN

## 2025-02-23 DIAGNOSIS — D61.810 PANCYTOPENIA DUE TO CHEMOTHERAPY: ICD-10-CM

## 2025-02-23 DIAGNOSIS — R53.83 FATIGUE, UNSPECIFIED TYPE: Primary | ICD-10-CM

## 2025-02-23 LAB
ANION GAP SERPL CALCULATED.3IONS-SCNC: 9 MMOL/L (ref 9–17)
BASOPHILS # BLD: <0.03 K/UL (ref 0–0.2)
BASOPHILS NFR BLD: 0 % (ref 0–2)
BILIRUB UR QL STRIP: NEGATIVE
BUN SERPL-MCNC: 19 MG/DL (ref 6–20)
BUN/CREAT SERPL: 17 (ref 9–20)
CALCIUM SERPL-MCNC: 8.7 MG/DL (ref 8.6–10.4)
CHLORIDE SERPL-SCNC: 104 MMOL/L (ref 98–107)
CLARITY UR: CLEAR
CO2 SERPL-SCNC: 26 MMOL/L (ref 20–31)
COLOR UR: YELLOW
COMMENT: NORMAL
CREAT SERPL-MCNC: 1.1 MG/DL (ref 0.5–0.9)
EOSINOPHIL # BLD: 0.03 K/UL (ref 0–0.44)
EOSINOPHILS RELATIVE PERCENT: 1 % (ref 1–4)
ERYTHROCYTE [DISTWIDTH] IN BLOOD BY AUTOMATED COUNT: 13.9 % (ref 11.8–14.4)
GFR, ESTIMATED: 63 ML/MIN/1.73M2
GLUCOSE SERPL-MCNC: 95 MG/DL (ref 70–99)
GLUCOSE UR STRIP-MCNC: NEGATIVE MG/DL
HCT VFR BLD AUTO: 22.7 % (ref 36.3–47.1)
HGB BLD-MCNC: 8 G/DL (ref 11.9–15.1)
HGB UR QL STRIP.AUTO: NEGATIVE
IMM GRANULOCYTES # BLD AUTO: <0.03 K/UL (ref 0–0.3)
IMM GRANULOCYTES NFR BLD: 0 %
KETONES UR STRIP-MCNC: NEGATIVE MG/DL
LACTATE BLDV-SCNC: 0.8 MMOL/L (ref 0.5–2.2)
LEUKOCYTE ESTERASE UR QL STRIP: NEGATIVE
LYMPHOCYTES NFR BLD: 0.82 K/UL (ref 1.1–3.7)
LYMPHOCYTES RELATIVE PERCENT: 33 % (ref 24–43)
MCH RBC QN AUTO: 33.6 PG (ref 25.2–33.5)
MCHC RBC AUTO-ENTMCNC: 35.2 G/DL (ref 25.2–33.5)
MCV RBC AUTO: 95.4 FL (ref 82.6–102.9)
MONOCYTES NFR BLD: 0.26 K/UL (ref 0.1–1.2)
MONOCYTES NFR BLD: 11 % (ref 3–12)
NEUTROPHILS NFR BLD: 55 % (ref 36–65)
NEUTS SEG NFR BLD: 1.37 K/UL (ref 1.5–8.1)
NITRITE UR QL STRIP: NEGATIVE
NRBC BLD-RTO: 0 PER 100 WBC
PH UR STRIP: 5.5 [PH] (ref 5–6)
PLATELET # BLD AUTO: 56 K/UL (ref 138–453)
PMV BLD AUTO: 11.3 FL (ref 8.1–13.5)
POTASSIUM SERPL-SCNC: 4 MMOL/L (ref 3.7–5.3)
PROT UR STRIP-MCNC: NEGATIVE MG/DL
RBC # BLD AUTO: 2.38 M/UL (ref 3.95–5.11)
SODIUM SERPL-SCNC: 139 MMOL/L (ref 135–144)
SP GR UR STRIP: 1.02 (ref 1.01–1.02)
UROBILINOGEN UR STRIP-ACNC: NORMAL EU/DL (ref 0–1)
WBC OTHER # BLD: 2.5 K/UL (ref 3.5–11.3)

## 2025-02-23 PROCEDURE — 6360000002 HC RX W HCPCS

## 2025-02-23 PROCEDURE — 85025 COMPLETE CBC W/AUTO DIFF WBC: CPT

## 2025-02-23 PROCEDURE — 81003 URINALYSIS AUTO W/O SCOPE: CPT

## 2025-02-23 PROCEDURE — 83605 ASSAY OF LACTIC ACID: CPT

## 2025-02-23 PROCEDURE — 80048 BASIC METABOLIC PNL TOTAL CA: CPT

## 2025-02-23 PROCEDURE — 99283 EMERGENCY DEPT VISIT LOW MDM: CPT

## 2025-02-23 RX ORDER — HEPARIN SODIUM (PORCINE) LOCK FLUSH IV SOLN 100 UNIT/ML 100 UNIT/ML
300 SOLUTION INTRAVENOUS PRN
Status: DISCONTINUED | OUTPATIENT
Start: 2025-02-23 | End: 2025-02-24 | Stop reason: HOSPADM

## 2025-02-23 RX ORDER — HEPARIN 100 UNIT/ML
SYRINGE INTRAVENOUS
Status: COMPLETED
Start: 2025-02-23 | End: 2025-02-23

## 2025-02-23 RX ADMIN — HEPARIN 300 UNITS: 100 SYRINGE at 23:38

## 2025-02-23 ASSESSMENT — LIFESTYLE VARIABLES
HOW MANY STANDARD DRINKS CONTAINING ALCOHOL DO YOU HAVE ON A TYPICAL DAY: PATIENT DOES NOT DRINK
HOW OFTEN DO YOU HAVE A DRINK CONTAINING ALCOHOL: NEVER

## 2025-02-23 ASSESSMENT — PAIN - FUNCTIONAL ASSESSMENT: PAIN_FUNCTIONAL_ASSESSMENT: NONE - DENIES PAIN

## 2025-02-24 NOTE — ED PROVIDER NOTES
[facility] EMERGENCY DEPARTMENT  EMERGENCY DEPARTMENT ENCOUNTER      Pt Name: Evangelina King  MRN: [id number]  Birthdate 1980  Date of evaluation: 2/23/2025  Provider: Ana Baumann MD    CHIEF COMPLAINT       Chief Complaint   Patient presents with    Fatigue    Current chemo patient for cervical CA      HISTORY OF PRESENT ILLNESS  (Location/Symptom, Timing/Onset, Context/Setting, Quality, Duration, Modifying Factors, Severity.)   Evangelina King is a 44 y.o. female who presents to the emergency department for fatigue.  She is a current chemo patient for cervical cancer and she is concerned that her hemoglobin is low.  She has been low before when she has felt like this and she knows that she should not wait so that is why she came in.  She denies any chest pain or shortness of breath.  No fever.    Nursing Notes were reviewed.    REVIEW OF SYSTEMS    (2-9 systems for level 4, 10 or more for level 5)     Review of Systems   Constitutional:  Positive for fatigue. Negative for activity change, appetite change, chills and fever.   HENT:  Negative for congestion, ear pain and sore throat.    Eyes:  Negative for pain, discharge and redness.   Respiratory:  Negative for cough, shortness of breath, wheezing and stridor.    Cardiovascular:  Negative for chest pain.   Gastrointestinal:  Negative for abdominal pain, constipation, diarrhea, nausea and vomiting.   Genitourinary:  Negative for decreased urine volume and difficulty urinating.   Musculoskeletal:  Negative for arthralgias and myalgias.   Skin:  Negative for color change and rash.   Neurological:  Positive for dizziness. Negative for weakness and headaches.   Psychiatric/Behavioral:  Negative for behavioral problems and confusion.        Except as noted above the remainder of the review of systems was reviewed and negative.       PAST MEDICAL HISTORY     Past Medical History:   Diagnosis Date    Cancer (HCC)     14 years ago per patient    Cervical

## 2025-02-26 ENCOUNTER — HOSPITAL ENCOUNTER (OUTPATIENT)
Dept: INFUSION THERAPY | Age: 45
Discharge: HOME | End: 2025-02-26
Payer: COMMERCIAL

## 2025-02-26 DIAGNOSIS — C53.9 PRIMARY CERVICAL SQUAMOUS CELL CARCINOMA (HCC): Primary | ICD-10-CM

## 2025-02-26 LAB
ALBUMIN SERPL-MCNC: 4.1 G/DL (ref 3.5–5.2)
ALBUMIN/GLOB SERPL: 1.5 {RATIO} (ref 1–2.5)
ALP SERPL-CCNC: 117 U/L (ref 35–104)
ALT SERPL-CCNC: 11 U/L (ref 5–33)
ANION GAP SERPL CALCULATED.3IONS-SCNC: 10 MMOL/L (ref 9–17)
AST SERPL-CCNC: 18 U/L
BASOPHILS # BLD: <0.03 K/UL (ref 0–0.2)
BASOPHILS NFR BLD: 0 % (ref 0–2)
BILIRUB SERPL-MCNC: 0.3 MG/DL (ref 0.3–1.2)
BUN SERPL-MCNC: 14 MG/DL (ref 6–20)
BUN/CREAT SERPL: 14 (ref 9–20)
CALCIUM SERPL-MCNC: 9.3 MG/DL (ref 8.6–10.4)
CHLORIDE SERPL-SCNC: 105 MMOL/L (ref 98–107)
CO2 SERPL-SCNC: 25 MMOL/L (ref 20–31)
CREAT SERPL-MCNC: 1 MG/DL (ref 0.5–0.9)
EOSINOPHIL # BLD: 0.04 K/UL (ref 0–0.44)
EOSINOPHILS RELATIVE PERCENT: 2 % (ref 1–4)
ERYTHROCYTE [DISTWIDTH] IN BLOOD BY AUTOMATED COUNT: 14.9 % (ref 11.8–14.4)
GFR, ESTIMATED: 71 ML/MIN/1.73M2
GLUCOSE SERPL-MCNC: 150 MG/DL (ref 70–99)
HCT VFR BLD AUTO: 26.3 % (ref 36.3–47.1)
HGB BLD-MCNC: 9.1 G/DL (ref 11.9–15.1)
IMM GRANULOCYTES # BLD AUTO: <0.03 K/UL (ref 0–0.3)
IMM GRANULOCYTES NFR BLD: 0 %
LYMPHOCYTES NFR BLD: 0.88 K/UL (ref 1.1–3.7)
LYMPHOCYTES RELATIVE PERCENT: 38 % (ref 24–43)
MCH RBC QN AUTO: 33.7 PG (ref 25.2–33.5)
MCHC RBC AUTO-ENTMCNC: 34.6 G/DL (ref 25.2–33.5)
MCV RBC AUTO: 97.4 FL (ref 82.6–102.9)
MONOCYTES NFR BLD: 0.27 K/UL (ref 0.1–1.2)
MONOCYTES NFR BLD: 12 % (ref 3–12)
NEUTROPHILS NFR BLD: 48 % (ref 36–65)
NEUTS SEG NFR BLD: 1.12 K/UL (ref 1.5–8.1)
NRBC BLD-RTO: 0 PER 100 WBC
PLATELET # BLD AUTO: 91 K/UL (ref 138–453)
PMV BLD AUTO: 11.3 FL (ref 8.1–13.5)
POTASSIUM SERPL-SCNC: 3.9 MMOL/L (ref 3.7–5.3)
PROT SERPL-MCNC: 6.8 G/DL (ref 6.4–8.3)
RBC # BLD AUTO: 2.7 M/UL (ref 3.95–5.11)
RBC # BLD: ABNORMAL 10*6/UL
SODIUM SERPL-SCNC: 140 MMOL/L (ref 135–144)
WBC OTHER # BLD: 2.3 K/UL (ref 3.5–11.3)

## 2025-02-26 PROCEDURE — 2500000003 HC RX 250 WO HCPCS: Performed by: INTERNAL MEDICINE

## 2025-02-26 PROCEDURE — 36591 DRAW BLOOD OFF VENOUS DEVICE: CPT

## 2025-02-26 PROCEDURE — 80053 COMPREHEN METABOLIC PANEL: CPT

## 2025-02-26 PROCEDURE — 85025 COMPLETE CBC W/AUTO DIFF WBC: CPT

## 2025-02-26 PROCEDURE — 6360000002 HC RX W HCPCS: Performed by: INTERNAL MEDICINE

## 2025-02-26 RX ORDER — HEPARIN 100 UNIT/ML
500 SYRINGE INTRAVENOUS PRN
Status: DISCONTINUED | OUTPATIENT
Start: 2025-02-26 | End: 2025-02-27 | Stop reason: HOSPADM

## 2025-02-26 RX ORDER — SODIUM CHLORIDE 0.9 % (FLUSH) 0.9 %
5-40 SYRINGE (ML) INJECTION PRN
Status: DISCONTINUED | OUTPATIENT
Start: 2025-02-26 | End: 2025-02-27 | Stop reason: HOSPADM

## 2025-02-26 RX ORDER — SODIUM CHLORIDE 0.9 % (FLUSH) 0.9 %
5-40 SYRINGE (ML) INJECTION PRN
OUTPATIENT
Start: 2025-02-26

## 2025-02-26 RX ORDER — HEPARIN 100 UNIT/ML
500 SYRINGE INTRAVENOUS PRN
OUTPATIENT
Start: 2025-02-26

## 2025-02-26 RX ORDER — SODIUM CHLORIDE 9 MG/ML
25 INJECTION, SOLUTION INTRAVENOUS PRN
OUTPATIENT
Start: 2025-02-26

## 2025-02-26 RX ADMIN — SODIUM CHLORIDE, PRESERVATIVE FREE 10 ML: 5 INJECTION INTRAVENOUS at 13:48

## 2025-02-26 RX ADMIN — HEPARIN 500 UNITS: 100 SYRINGE at 13:49

## 2025-03-05 ENCOUNTER — TELEPHONE (OUTPATIENT)
Dept: INTERNAL MEDICINE | Age: 45
End: 2025-03-05

## 2025-03-05 NOTE — TELEPHONE ENCOUNTER
Nini with [facility] called in and needed the most recent office notes sent to their office fax number is [phone].  I faxed over the last office notes per her request.

## 2025-03-12 ENCOUNTER — TELEPHONE (OUTPATIENT)
Dept: INTERNAL MEDICINE | Age: 45
End: 2025-03-12

## 2025-03-12 RX ORDER — SODIUM CHLORIDE 9 MG/ML
5-250 INJECTION, SOLUTION INTRAVENOUS PRN
Status: CANCELLED | OUTPATIENT
Start: 2025-03-17

## 2025-03-12 NOTE — TELEPHONE ENCOUNTER
Received fax from [facility]ology ([facility])- Decadron and NS Infusion orders; and lab orders (Mag, CMP, CBC with Diff).  Spoke with our Infusion Room (ext [phone])- they do accept outside orders. They will contact pt to schedule infusion.  Orders faxed to Infusion Center ([phone]).

## 2025-03-17 ENCOUNTER — HOSPITAL ENCOUNTER (OUTPATIENT)
Dept: INFUSION THERAPY | Age: 45
Discharge: HOME | End: 2025-03-17
Payer: COMMERCIAL

## 2025-03-17 VITALS
RESPIRATION RATE: 16 BRPM | HEART RATE: 92 BPM | OXYGEN SATURATION: 99 % | DIASTOLIC BLOOD PRESSURE: 76 MMHG | TEMPERATURE: 97.2 F | SYSTOLIC BLOOD PRESSURE: 126 MMHG

## 2025-03-17 DIAGNOSIS — C53.9 PRIMARY CERVICAL SQUAMOUS CELL CARCINOMA: Primary | ICD-10-CM

## 2025-03-17 LAB
ALBUMIN SERPL-MCNC: 4 G/DL (ref 3.5–5.2)
ALBUMIN/GLOB SERPL: 1.4 {RATIO} (ref 1–2.5)
ALP SERPL-CCNC: 100 U/L (ref 35–104)
ALT SERPL-CCNC: 9 U/L (ref 5–33)
ANION GAP SERPL CALCULATED.3IONS-SCNC: 11 MMOL/L (ref 9–17)
AST SERPL-CCNC: 14 U/L
BASOPHILS # BLD: <0.03 K/UL (ref 0–0.2)
BASOPHILS NFR BLD: 0 % (ref 0–2)
BILIRUB SERPL-MCNC: 0.2 MG/DL (ref 0.3–1.2)
BUN SERPL-MCNC: 16 MG/DL (ref 6–20)
BUN/CREAT SERPL: 18 (ref 9–20)
CALCIUM SERPL-MCNC: 9.1 MG/DL (ref 8.6–10.4)
CHLORIDE SERPL-SCNC: 105 MMOL/L (ref 98–107)
CO2 SERPL-SCNC: 24 MMOL/L (ref 20–31)
CREAT SERPL-MCNC: 0.9 MG/DL (ref 0.5–0.9)
EOSINOPHIL # BLD: 0.04 K/UL (ref 0–0.44)
EOSINOPHILS RELATIVE PERCENT: 2 % (ref 1–4)
ERYTHROCYTE [DISTWIDTH] IN BLOOD BY AUTOMATED COUNT: 14.6 % (ref 11.8–14.4)
GFR, ESTIMATED: 80 ML/MIN/1.73M2
GLUCOSE SERPL-MCNC: 132 MG/DL (ref 70–99)
HCT VFR BLD AUTO: 23.7 % (ref 36.3–47.1)
HGB BLD-MCNC: 8.2 G/DL (ref 11.9–15.1)
IMM GRANULOCYTES # BLD AUTO: <0.03 K/UL (ref 0–0.3)
IMM GRANULOCYTES NFR BLD: 0 %
LYMPHOCYTES NFR BLD: 0.8 K/UL (ref 1.1–3.7)
LYMPHOCYTES RELATIVE PERCENT: 34 % (ref 24–43)
MAGNESIUM SERPL-MCNC: 1.8 MG/DL (ref 1.6–2.6)
MCH RBC QN AUTO: 33.3 PG (ref 25.2–33.5)
MCHC RBC AUTO-ENTMCNC: 34.6 G/DL (ref 25.2–33.5)
MCV RBC AUTO: 96.3 FL (ref 82.6–102.9)
MONOCYTES NFR BLD: 0.22 K/UL (ref 0.1–1.2)
MONOCYTES NFR BLD: 9 % (ref 3–12)
NEUTROPHILS NFR BLD: 54 % (ref 36–65)
NEUTS SEG NFR BLD: 1.27 K/UL (ref 1.5–8.1)
NRBC BLD-RTO: 0.9 PER 100 WBC
PLATELET # BLD AUTO: ABNORMAL K/UL (ref 138–453)
PLATELET, FLUORESCENCE: 48 K/UL (ref 138–453)
PLATELETS.RETICULATED NFR BLD AUTO: 4.5 % (ref 1.1–10.3)
POTASSIUM SERPL-SCNC: 4 MMOL/L (ref 3.7–5.3)
PROT SERPL-MCNC: 6.9 G/DL (ref 6.4–8.3)
RBC # BLD AUTO: 2.46 M/UL (ref 3.95–5.11)
SODIUM SERPL-SCNC: 140 MMOL/L (ref 135–144)
WBC OTHER # BLD: 2.3 K/UL (ref 3.5–11.3)

## 2025-03-17 PROCEDURE — 96360 HYDRATION IV INFUSION INIT: CPT

## 2025-03-17 PROCEDURE — 83735 ASSAY OF MAGNESIUM: CPT

## 2025-03-17 PROCEDURE — 96361 HYDRATE IV INFUSION ADD-ON: CPT

## 2025-03-17 PROCEDURE — 80053 COMPREHEN METABOLIC PANEL: CPT

## 2025-03-17 PROCEDURE — 85025 COMPLETE CBC W/AUTO DIFF WBC: CPT

## 2025-03-17 PROCEDURE — 6360000002 HC RX W HCPCS: Performed by: NURSE PRACTITIONER

## 2025-03-17 PROCEDURE — 2580000003 HC RX 258: Performed by: NURSE PRACTITIONER

## 2025-03-17 PROCEDURE — 96375 TX/PRO/DX INJ NEW DRUG ADDON: CPT

## 2025-03-17 PROCEDURE — 2500000003 HC RX 250 WO HCPCS: Performed by: NURSE PRACTITIONER

## 2025-03-17 PROCEDURE — 96374 THER/PROPH/DIAG INJ IV PUSH: CPT

## 2025-03-17 RX ORDER — SODIUM CHLORIDE 9 MG/ML
5-250 INJECTION, SOLUTION INTRAVENOUS PRN
OUTPATIENT
Start: 2025-03-17

## 2025-03-17 RX ORDER — DEXAMETHASONE SODIUM PHOSPHATE 10 MG/ML
10 INJECTION, SOLUTION INTRAMUSCULAR; INTRAVENOUS ONCE
Status: COMPLETED | OUTPATIENT
Start: 2025-03-17 | End: 2025-03-17

## 2025-03-17 RX ORDER — HEPARIN 100 UNIT/ML
500 SYRINGE INTRAVENOUS PRN
OUTPATIENT
Start: 2025-03-17

## 2025-03-17 RX ORDER — SODIUM CHLORIDE 0.9 % (FLUSH) 0.9 %
5-40 SYRINGE (ML) INJECTION PRN
OUTPATIENT
Start: 2025-03-17

## 2025-03-17 RX ORDER — DEXAMETHASONE SODIUM PHOSPHATE 10 MG/ML
10 INJECTION, SOLUTION INTRAMUSCULAR; INTRAVENOUS ONCE
Start: 2025-03-17 | End: 2025-03-17

## 2025-03-17 RX ORDER — HEPARIN 100 UNIT/ML
500 SYRINGE INTRAVENOUS PRN
Status: DISCONTINUED | OUTPATIENT
Start: 2025-03-17 | End: 2025-03-18 | Stop reason: HOSPADM

## 2025-03-17 RX ORDER — SODIUM CHLORIDE 9 MG/ML
INJECTION, SOLUTION INTRAVENOUS PRN
Status: DISCONTINUED | OUTPATIENT
Start: 2025-03-17 | End: 2025-03-18 | Stop reason: HOSPADM

## 2025-03-17 RX ORDER — 0.9 % SODIUM CHLORIDE 0.9 %
1000 INTRAVENOUS SOLUTION INTRAVENOUS ONCE
Status: COMPLETED | OUTPATIENT
Start: 2025-03-17 | End: 2025-03-17

## 2025-03-17 RX ORDER — SODIUM CHLORIDE 0.9 % (FLUSH) 0.9 %
5-40 SYRINGE (ML) INJECTION PRN
Status: DISCONTINUED | OUTPATIENT
Start: 2025-03-17 | End: 2025-03-18 | Stop reason: HOSPADM

## 2025-03-17 RX ORDER — 0.9 % SODIUM CHLORIDE 0.9 %
1000 INTRAVENOUS SOLUTION INTRAVENOUS ONCE
OUTPATIENT
Start: 2025-03-17 | End: 2025-03-17

## 2025-03-17 RX ADMIN — SODIUM CHLORIDE, PRESERVATIVE FREE 10 ML: 5 INJECTION INTRAVENOUS at 11:00

## 2025-03-17 RX ADMIN — DEXAMETHASONE SODIUM PHOSPHATE 10 MG: 10 INJECTION, SOLUTION INTRAMUSCULAR; INTRAVENOUS at 09:16

## 2025-03-17 RX ADMIN — SODIUM CHLORIDE 1000 ML: 0.9 INJECTION, SOLUTION INTRAVENOUS at 09:07

## 2025-03-17 RX ADMIN — HEPARIN 500 UNITS: 100 SYRINGE at 11:00

## 2025-03-17 NOTE — PROGRESS NOTES
Patient arrived for Hydration and dexamethazone IV infusion.  VSS as charted.  Port accessed without complication. Lab draw off of port access.  Patient tolerated infusion well.  Patient left infusion center with all belongings and steady gate.

## 2025-03-17 NOTE — PROGRESS NOTES
03/17/25 0919   Encounter Summary   Encounter Overview/Reason Spiritual/Emotional Needs   Service Provided For Patient   Referral/Consult From Rounding   Support System Spouse;Parent   Last Encounter  03/17/25   Complexity of Encounter Low   Begin Time 0850   End Time  0855   Total Time Calculated 5 min   Spiritual/Emotional needs   Type Spiritual Support   Assessment/Intervention/Outcome   Assessment Calm;Coping;Hopeful   Intervention Active listening;Prayer (assurance of)/Excello   Outcome Acceptance;Engaged in conversation;Expressed Gratitude      rounding on Infusion    Assessment: Patient is receiving treatments here but her oncologist is in [facility]. She related being lethargic between treatments but is thankful for healing.     Intervention: Engaged in conversation. Patient expressed appreciation for visit and offer of continued prayer.    Plan: Chaplains are available on site or on call 24/7 for spiritual and emotional support.

## 2025-03-17 NOTE — PROGRESS NOTES
Order received to give 1 unit prbc's from Dr. Baxter's office.  Blood bank notified due to hgb 8.2.  At this time, blood bank will not fill blood order due to policy that hgb has to be under 7.0.  Dr. Baxter's office notified and are aware.  Dr. Baxter's office states they will notify pt.

## 2025-03-20 ENCOUNTER — HOSPITAL ENCOUNTER (EMERGENCY)
Age: 45
Discharge: HOME | End: 2025-03-21
Attending: STUDENT IN AN ORGANIZED HEALTH CARE EDUCATION/TRAINING PROGRAM
Payer: COMMERCIAL

## 2025-03-20 DIAGNOSIS — R53.83 OTHER FATIGUE: Primary | ICD-10-CM

## 2025-03-20 LAB
ALBUMIN SERPL-MCNC: 4 G/DL (ref 3.5–5.2)
ALBUMIN/GLOB SERPL: 1.5 {RATIO} (ref 1–2.5)
ALP SERPL-CCNC: 98 U/L (ref 35–104)
ALT SERPL-CCNC: 8 U/L (ref 5–33)
ANION GAP SERPL CALCULATED.3IONS-SCNC: 11 MMOL/L (ref 9–17)
AST SERPL-CCNC: 12 U/L
BASOPHILS # BLD: 0 K/UL (ref 0–0.2)
BASOPHILS NFR BLD: 0 % (ref 0–1)
BILIRUB SERPL-MCNC: 0.2 MG/DL (ref 0.3–1.2)
BUN SERPL-MCNC: 23 MG/DL (ref 6–20)
BUN/CREAT SERPL: 18 (ref 9–20)
CALCIUM SERPL-MCNC: 9.3 MG/DL (ref 8.6–10.4)
CHLORIDE SERPL-SCNC: 102 MMOL/L (ref 98–107)
CO2 SERPL-SCNC: 26 MMOL/L (ref 20–31)
CREAT SERPL-MCNC: 1.3 MG/DL (ref 0.5–0.9)
EOSINOPHIL # BLD: 0.04 K/UL (ref 0–0.4)
EOSINOPHILS RELATIVE PERCENT: 2 % (ref 1–7)
ERYTHROCYTE [DISTWIDTH] IN BLOOD BY AUTOMATED COUNT: 16.3 % (ref 11.8–14.4)
GFR, ESTIMATED: 51 ML/MIN/1.73M2
GLUCOSE SERPL-MCNC: 104 MG/DL (ref 70–99)
HCT VFR BLD AUTO: 21.4 % (ref 36.3–47.1)
HGB BLD-MCNC: 7.4 G/DL (ref 11.9–15.1)
IMM GRANULOCYTES # BLD AUTO: 0.02 K/UL (ref 0–0.3)
IMM GRANULOCYTES NFR BLD: 1 %
LYMPHOCYTES NFR BLD: 0.77 K/UL (ref 1–4.8)
LYMPHOCYTES RELATIVE PERCENT: 44 % (ref 16–46)
MAGNESIUM SERPL-MCNC: 1.8 MG/DL (ref 1.6–2.6)
MCH RBC QN AUTO: 33.5 PG (ref 25.2–33.5)
MCHC RBC AUTO-ENTMCNC: 34.6 G/DL (ref 25.2–33.5)
MCV RBC AUTO: 96.8 FL (ref 82.6–102.9)
MONOCYTES NFR BLD: 0.36 K/UL (ref 0.1–1.2)
MONOCYTES NFR BLD: 20 % (ref 4–11)
MORPHOLOGY: ABNORMAL
MORPHOLOGY: ABNORMAL
NEUTROPHILS NFR BLD: 33 % (ref 43–77)
NEUTS SEG NFR BLD: 0.59 K/UL (ref 1.5–8.1)
NRBC BLD-RTO: 1.1 PER 100 WBC
NUCLEATED RED BLOOD CELLS: 1 PER 100 WBC
PLATELET # BLD AUTO: 77 K/UL (ref 138–453)
PMV BLD AUTO: 11.9 FL (ref 8.1–13.5)
POTASSIUM SERPL-SCNC: 4.2 MMOL/L (ref 3.7–5.3)
PROT SERPL-MCNC: 6.6 G/DL (ref 6.4–8.3)
RBC # BLD AUTO: 2.21 M/UL (ref 3.95–5.11)
SODIUM SERPL-SCNC: 139 MMOL/L (ref 135–144)
WBC OTHER # BLD: 1.8 K/UL (ref 3.5–11.3)

## 2025-03-20 PROCEDURE — P9016 RBC LEUKOCYTES REDUCED: HCPCS

## 2025-03-20 PROCEDURE — 36430 TRANSFUSION BLD/BLD COMPNT: CPT

## 2025-03-20 PROCEDURE — 80053 COMPREHEN METABOLIC PANEL: CPT

## 2025-03-20 PROCEDURE — 86850 RBC ANTIBODY SCREEN: CPT

## 2025-03-20 PROCEDURE — 86920 COMPATIBILITY TEST SPIN: CPT

## 2025-03-20 PROCEDURE — 86900 BLOOD TYPING SEROLOGIC ABO: CPT

## 2025-03-20 PROCEDURE — 83735 ASSAY OF MAGNESIUM: CPT

## 2025-03-20 PROCEDURE — 86901 BLOOD TYPING SEROLOGIC RH(D): CPT

## 2025-03-20 PROCEDURE — 36415 COLL VENOUS BLD VENIPUNCTURE: CPT

## 2025-03-20 PROCEDURE — 85025 COMPLETE CBC W/AUTO DIFF WBC: CPT

## 2025-03-20 PROCEDURE — 99285 EMERGENCY DEPT VISIT HI MDM: CPT

## 2025-03-20 RX ORDER — SODIUM CHLORIDE 9 MG/ML
INJECTION, SOLUTION INTRAVENOUS PRN
Status: DISCONTINUED | OUTPATIENT
Start: 2025-03-20 | End: 2025-03-21 | Stop reason: HOSPADM

## 2025-03-20 ASSESSMENT — PAIN - FUNCTIONAL ASSESSMENT: PAIN_FUNCTIONAL_ASSESSMENT: NONE - DENIES PAIN

## 2025-03-21 VITALS
SYSTOLIC BLOOD PRESSURE: 120 MMHG | OXYGEN SATURATION: 100 % | HEIGHT: 67 IN | TEMPERATURE: 97 F | BODY MASS INDEX: 38.61 KG/M2 | DIASTOLIC BLOOD PRESSURE: 50 MMHG | WEIGHT: 246 LBS | RESPIRATION RATE: 16 BRPM | HEART RATE: 79 BPM

## 2025-03-21 LAB
ABO/RH: NORMAL
ANTIBODY SCREEN: NEGATIVE
ARM BAND NUMBER: NORMAL
BLOOD BANK BLOOD PRODUCT EXPIRATION DATE: NORMAL
BLOOD BANK DISPENSE STATUS: NORMAL
BLOOD BANK ISBT PRODUCT BLOOD TYPE: 600
BLOOD BANK PRODUCT CODE: NORMAL
BLOOD BANK SAMPLE EXPIRATION: NORMAL
BLOOD BANK UNIT TYPE AND RH: NORMAL
BPU ID: NORMAL
COMPONENT: NORMAL
CROSSMATCH RESULT: NORMAL
HCT VFR BLD AUTO: 25.8 % (ref 36.3–47.1)
HGB BLD-MCNC: 8.8 G/DL (ref 11.9–15.1)
TRANSFUSION STATUS: NORMAL
UNIT DIVISION: 0
UNIT ISSUE DATE/TIME: NORMAL

## 2025-03-21 PROCEDURE — 85014 HEMATOCRIT: CPT

## 2025-03-21 PROCEDURE — 85018 HEMOGLOBIN: CPT

## 2025-03-21 PROCEDURE — 36415 COLL VENOUS BLD VENIPUNCTURE: CPT

## 2025-03-21 NOTE — DISCHARGE INSTRUCTIONS
SUMMARY OF YOUR VISIT    Today you were seen for serum labs.  Your hemoglobin was low and your creatinine was mildly elevated and therefore I did provide you a blood transfusion.  Your hemoglobin was trending up.    I recommend you follow-up with your primary care provider for reevaluation.  I recommend you follow-up with your oncology team for reevaluation.    If you have any new, changing, worsening, no improvement or develop additional symptoms or concerns I recommend return to the emergency department for reevaluation.    Please continue to take your home medication as previously prescribed, I have made no changes to your home medications.        You can return to our or another Emergency Department as needed or for worsening symptoms of chest pain, shortness of breath, high fevers not relieved by acetaminophen (Tylenol) and/or ibuprofen (Motrin / Advil), chills, feeling of your heart fluttering or racing, persistent nausea and/or vomiting, vomiting up blood, blood in your stool, loss of consciousness, numbness, weakness or tingling in the arms or legs or change in color of the extremities, changes in mental status, persistent headache, blurry vision, loss of bladder / bowel control, if you are unable to follow up with your physician, or other any other care or concern.    Thank You!    On behalf of the Emergency Department staff and team, I would like to thank you for allowing us the opportunity to participate in your health care and evaluation today.

## 2025-03-21 NOTE — CONSENT
Informed Consent for Blood Component Transfusion Note    I have discussed with the patient the rationale for blood component transfusion; its benefits in treating or preventing fatigue, organ damage, or death; and its risk which includes mild transfusion reactions, rare risk of blood borne infection, or more serious but rare reactions. I have discussed the alternatives to transfusion, including the risk and consequences of not receiving transfusion. The patient had an opportunity to ask questions and had agreed to proceed with transfusion of blood components.    Blood transfusion has been discussed with Preethi Neal, hospital President who has approved transfusion due for concerns for continued downtrending hemoglobin with creatinine elevation new since visit 3 days ago.  Concern for volume depletion.     Electronically signed by Tamra Weaver DO on 3/20/25 at 9:52 PM EDT

## 2025-03-21 NOTE — ED PROVIDER NOTES
with exception of those noted in HPI.     PHYSICAL EXAM      INITIAL VITALS:   BP (!) 120/50   Pulse 79   Temp 97 °F (36.1 °C) (Tympanic)   Resp 16   Ht 1.702 m (5' 7\")   Wt 111.6 kg (246 lb)   SpO2 100%   BMI 38.53 kg/m²     PHYSICAL EXAM:    Constitutional: Awake, alert, answering questions appropriately, non-toxic, non-lethargic    HEENT: Head is atraumatic, conjunctiva non-injected, normal nose present    Neck/Back: Moving neck freely on my examination, no meningeal signs present    Cardiovascular: Heart sounds are present, no pretibial edema, pallor noted    Respiratory: Breath sounds are present bilaterally, even unlabored breathing, no acute respiratory distress, no supplemental oxygen support in place.     Abdomen: Soft, non-distended, non-peritoneal, no tenderness to palpation     Skin: Warm, dry, intact    Neuro: Awake, alert, answering questions appropriately, moving all 4 extremities equally, no focal deficits on my examination       DDX/DIAGNOSTIC RESULTS / EMERGENCY DEPARTMENT COURSE / MDM     Medical Decision Making  45-year-old female presents with concerns for fatigue and dizziness.  Differential to include but not limited to acute anemia versus chronic anemia versus electrolyte derangement versus cardiac etiology versus pulmonary etiology versus other.  Patient declines EKG, multiple labs on arrival.  Patient requesting only serum laboratory workup with request for potentiation due to concerns for low hemoglobin and low platelets earlier in the week.    Amount and/or Complexity of Data Reviewed  Labs: ordered. Decision-making details documented in ED Course.            EMERGENCY DEPARTMENT COURSE:          Hemoglobin 7.4 on arrival.  Patient is not hypotensive or tachycardic although does have LESLIE with creatinine elevation therefore I do have concerns for volume depletion in setting of chronically ill patient with increased oxygen demand.  Thrombocytopenia noted.  Multiple laboratory  derangements noted, chronic in nature.  Due to downtrending hemoglobin as well as creatinine elevation with concerns for volume depletion we have discussed with hospital present providing 1 unit PRBCs for concerns for anemia.  [facility] present approved.  Patient received 1 unit PRBCs and reported improvement in symptomatology.  Repeat hemoglobin uptrending.  Patient requested discharge home with improvement of symptoms.  Strict return precautions provided.  Patient structured to follow-up with primary care provider and oncologist.  Instructed return if any new, changing, worsening, no improvement develop meant of additional symptoms.  Patient discharged in stable condition, improved condition            The patient and/or family and I have discussed the diagnosis(es) and risks, and we agree with discharging home to follow-up with their pertinent providers. The patient appears stable for discharge and has been instructed to return immediately for new concerning symptoms or if the symptoms worsen in any way. The patient understands that at this time there is no evidence for a more malignant underlying process, but the patient also understands that early in the process of an illness or injury, an emergency department workup can be falsely reassuring. Routine discharge counseling was given, and the patient understands that worsening, changing or persistent symptoms should prompt an immediate call or follow up with their primary physician or return to the emergency department.     I have reviewed the disposition diagnosis with the patient and or their family/guardian. I have answered their questions and given discharge instructions. They voiced understanding of these instructions and did not have any further questions or complaints.    FINAL IMPRESSION      1. Other fatigue          DISPOSITION / PLAN     DISPOSITION Decision To Discharge 03/21/2025 01:21:25 AM   DISPOSITION CONDITION Stable           PATIENT

## 2025-04-10 ENCOUNTER — TELEPHONE (OUTPATIENT)
Dept: INTERNAL MEDICINE | Age: 45
End: 2025-04-10

## 2025-04-10 RX ORDER — AZITHROMYCIN 250 MG/1
TABLET, FILM COATED ORAL
Qty: 6 TABLET | Refills: 0 | Status: SHIPPED | OUTPATIENT
Start: 2025-04-10 | End: 2025-04-20

## 2025-04-10 NOTE — TELEPHONE ENCOUNTER
Patient calls complaining of Cough  Onset:1 day(s) ago  Timing: worse at night, worse in the morning,   Severity:Severe  Character:  wet  Productive: yes, Sputum color: green  Associated Symptoms: chills, fever, and sputum production and Sneezing. Also Shortness of breath O2 99%    Patient advised:To check pharmacy after 3 o'clock  Ok to leave a message if we call back yes    Callback [phone]

## 2025-04-16 ENCOUNTER — HOSPITAL ENCOUNTER (OUTPATIENT)
Dept: GENERAL RADIOLOGY | Age: 45
Discharge: HOME | End: 2025-04-18
Payer: COMMERCIAL

## 2025-04-16 ENCOUNTER — RESULTS FOLLOW-UP (OUTPATIENT)
Dept: PRIMARY CARE CLINIC | Age: 45
End: 2025-04-16

## 2025-04-16 ENCOUNTER — OFFICE VISIT (OUTPATIENT)
Dept: PRIMARY CARE CLINIC | Age: 45
End: 2025-04-16
Payer: COMMERCIAL

## 2025-04-16 VITALS
HEART RATE: 91 BPM | DIASTOLIC BLOOD PRESSURE: 70 MMHG | HEIGHT: 67 IN | SYSTOLIC BLOOD PRESSURE: 104 MMHG | BODY MASS INDEX: 40.02 KG/M2 | WEIGHT: 255 LBS | TEMPERATURE: 98.5 F | OXYGEN SATURATION: 97 %

## 2025-04-16 DIAGNOSIS — J20.8 ACUTE BACTERIAL BRONCHITIS: Primary | ICD-10-CM

## 2025-04-16 DIAGNOSIS — B96.89 ACUTE BACTERIAL BRONCHITIS: ICD-10-CM

## 2025-04-16 DIAGNOSIS — J20.8 ACUTE BACTERIAL BRONCHITIS: ICD-10-CM

## 2025-04-16 DIAGNOSIS — B96.89 ACUTE BACTERIAL BRONCHITIS: Primary | ICD-10-CM

## 2025-04-16 DIAGNOSIS — J01.00 ACUTE NON-RECURRENT MAXILLARY SINUSITIS: ICD-10-CM

## 2025-04-16 PROCEDURE — 99213 OFFICE O/P EST LOW 20 MIN: CPT

## 2025-04-16 PROCEDURE — 71046 X-RAY EXAM CHEST 2 VIEWS: CPT

## 2025-04-16 RX ORDER — DOXYCYCLINE HYCLATE 100 MG
100 TABLET ORAL 2 TIMES DAILY
Qty: 20 TABLET | Refills: 0 | Status: SHIPPED | OUTPATIENT
Start: 2025-04-16 | End: 2025-04-26

## 2025-04-16 RX ORDER — PREDNISONE 20 MG/1
40 TABLET ORAL DAILY
Qty: 10 TABLET | Refills: 0 | Status: SHIPPED | OUTPATIENT
Start: 2025-04-16 | End: 2025-04-21

## 2025-04-16 RX ORDER — BENZONATATE 100 MG/1
100 CAPSULE ORAL 3 TIMES DAILY PRN
Qty: 30 CAPSULE | Refills: 0 | Status: SHIPPED | OUTPATIENT
Start: 2025-04-16 | End: 2025-04-26

## 2025-04-16 RX ORDER — ALBUTEROL SULFATE 90 UG/1
2 INHALANT RESPIRATORY (INHALATION) 4 TIMES DAILY PRN
Qty: 18 G | Refills: 0 | Status: SHIPPED | OUTPATIENT
Start: 2025-04-16

## 2025-04-16 ASSESSMENT — ENCOUNTER SYMPTOMS
CHEST TIGHTNESS: 0
SHORTNESS OF BREATH: 1
SORE THROAT: 0
COUGH: 1
RHINORRHEA: 0
SINUS PRESSURE: 1

## 2025-04-16 NOTE — PROGRESS NOTES
[facility] Walk In department of [facility]  [address] [facility] [address]  Phone: [phone]  Fax: [phone]      Evangelina King  1980  MRN: [id number]  Date of visit: 4/16/2025    Chief Complaint:     Evangelina King is here for c/o of Cough (Pt is in immunotherapy and chemo. Pt home health nurse stated her lungs did not sound good at 10 am today.  )      HPI:     Evangelina King is a 45 y.o. female who presents to the [facility] today for her medical conditions/complaints as noted below.    Cough  This is a new problem. Episode onset: 1 week. The problem has been gradually worsening. The problem occurs constantly. The cough is Productive of purulent sputum. Associated symptoms include ear pain, a fever, headaches, nasal congestion and shortness of breath. Pertinent negatives include no chest pain, chills, myalgias, rhinorrhea or sore throat. Nothing aggravates the symptoms. Treatments tried: z melisa, sudafed. The treatment provided no relief. There is no history of asthma or COPD.       Past Medical History:   Diagnosis Date    Cancer ([facility])     14 years ago per patient    Cervical cancer ([facility])     COVID-19 2020    COVID-19 vaccine series not administered     History of chemotherapy     chem every 3 weeks keytuda infusions/rt chest port    Hydronephrosis     anna    Nephrostomy present ([facility])     tube    Obstructive uropathy     Uterine mass     Wears glasses         Allergies   Allergen Reactions    Morphine Other (See Comments)     Severe whole body shaking          Subjective:      Review of Systems   Constitutional:  Positive for fever. Negative for chills.   HENT:  Positive for ear pain and sinus pressure. Negative for rhinorrhea and sore throat.    Respiratory:  Positive for cough and shortness of breath. Negative for chest tightness.    Cardiovascular:  Negative for chest pain.   Musculoskeletal:  Negative for myalgias.   Neurological:  Positive

## 2025-04-16 NOTE — PATIENT INSTRUCTIONS
Will call with results of xray  Steroid x 5 days  Discussed taking medication as prescribed in AM with food  Avoid NSAIDs while taking prescription steroid  Complete full course of antibiotics  Inhaler as needed for shortness of breath/wheezing  Benzonatate as needed for cough  Continue with mucinex and nasal sprays  May use a rich pot or saline rinses as tolerated  May use tylenol for headaches  Increase water intake  If symptoms worsen follow up with PCP  Patient verbalized understanding and agrees with plan of care

## 2025-04-17 ENCOUNTER — OFFICE VISIT (OUTPATIENT)
Dept: INTERNAL MEDICINE | Age: 45
End: 2025-04-17

## 2025-04-17 VITALS
DIASTOLIC BLOOD PRESSURE: 78 MMHG | WEIGHT: 251 LBS | RESPIRATION RATE: 16 BRPM | HEIGHT: 67 IN | SYSTOLIC BLOOD PRESSURE: 128 MMHG | BODY MASS INDEX: 39.39 KG/M2 | OXYGEN SATURATION: 100 % | HEART RATE: 74 BPM

## 2025-04-17 DIAGNOSIS — R11.0 CHEMOTHERAPY-INDUCED NAUSEA: ICD-10-CM

## 2025-04-17 DIAGNOSIS — C53.9 PRIMARY CERVICAL SQUAMOUS CELL CARCINOMA: ICD-10-CM

## 2025-04-17 DIAGNOSIS — Z51.11 CHEMOTHERAPY MANAGEMENT, ENCOUNTER FOR: ICD-10-CM

## 2025-04-17 DIAGNOSIS — F33.1 MAJOR DEPRESSIVE DISORDER, RECURRENT, MODERATE (HCC): ICD-10-CM

## 2025-04-17 DIAGNOSIS — R21 RASH: Primary | ICD-10-CM

## 2025-04-17 DIAGNOSIS — T45.1X5A CHEMOTHERAPY-INDUCED NAUSEA: ICD-10-CM

## 2025-04-17 RX ORDER — PROCHLORPERAZINE MALEATE 10 MG
10 TABLET ORAL EVERY 8 HOURS PRN
Qty: 40 TABLET | Refills: 1 | Status: SHIPPED | OUTPATIENT
Start: 2025-04-17

## 2025-04-17 NOTE — PROGRESS NOTES
[facility] INTERNAL MEDICINE    Visit Date:  4/17/2025  Patient:  Evangelina King  YOB: 1980    Assessment & Plan    Thrombocytopenia: Possibly due to chemotherapy.  Will order CBC.    Depression: Continue Zoloft.  Reassess next visit.    Cervical squamous cell carcinoma.  Follows with oncology, and gynecology oncology.  Will defer management.    Nephrostomy tube status: Nephrostomy tubes recently removed.  Due to ureteral obstruction secondary to cervical cancer.  Follows with urology    Cancer related pain: Can continue Tylenol with codeine.  Reassess next visit.     Diagnosis Orders   1. Jesus Francois MD, Dermatology, [place]      2. Primary cervical squamous cell carcinoma  prochlorperazine (COMPAZINE) 10 MG tablet      3. Chemotherapy management, encounter for  prochlorperazine (COMPAZINE) 10 MG tablet      4. Chemotherapy-induced nausea  prochlorperazine (COMPAZINE) 10 MG tablet      5. Major depressive disorder, recurrent, moderate (HCC)              Chief Complaint  Follow-up (Cough )    History of Present Illness   Presents to follow-up after treatment in urgent care.  She was treated for a URI.  She was prescribed antibiotics and albuterol inhaler as well as Tessalon Perles and says that she feels better now.    Has a history of cervical squamous cell carcinoma for which she follows with oncology and gynecology oncology.  She is not on Keytruda anymore.  She says that she will try to change her oncologist so that she can see both oncology and gynecology in [facility] in [place].    Objective  /78 (BP Site: Left Upper Arm, Patient Position: Sitting, BP Cuff Size: Medium Adult)   Pulse 74   Resp 16   Ht 1.702 m (5' 7\")   Wt 113.9 kg (251 lb)   SpO2 100%   BMI 39.31 kg/m²   Constitutional: No acute distress.  Sits in chair comfortably  Eyes: Sclerae nonicteric. No lid lag or proptosis  HENT: External ears normal. No external lesions on the nose  Neck:

## 2025-06-17 ENCOUNTER — HOSPITAL ENCOUNTER (OUTPATIENT)
Dept: INFUSION THERAPY | Age: 45
Discharge: HOME | End: 2025-06-17
Payer: COMMERCIAL

## 2025-06-17 ENCOUNTER — OFFICE VISIT (OUTPATIENT)
Dept: INTERNAL MEDICINE | Age: 45
End: 2025-06-17

## 2025-06-17 VITALS
DIASTOLIC BLOOD PRESSURE: 78 MMHG | WEIGHT: 266 LBS | BODY MASS INDEX: 41.75 KG/M2 | SYSTOLIC BLOOD PRESSURE: 126 MMHG | HEART RATE: 78 BPM | OXYGEN SATURATION: 98 % | HEIGHT: 67 IN | RESPIRATION RATE: 16 BRPM

## 2025-06-17 DIAGNOSIS — D64.9 ANEMIA, UNSPECIFIED TYPE: ICD-10-CM

## 2025-06-17 DIAGNOSIS — C53.9 PRIMARY CERVICAL SQUAMOUS CELL CARCINOMA (HCC): Primary | ICD-10-CM

## 2025-06-17 DIAGNOSIS — R73.01 IFG (IMPAIRED FASTING GLUCOSE): ICD-10-CM

## 2025-06-17 DIAGNOSIS — Z13.220 SCREENING FOR LIPID DISORDERS: ICD-10-CM

## 2025-06-17 DIAGNOSIS — R53.83 OTHER FATIGUE: ICD-10-CM

## 2025-06-17 DIAGNOSIS — R53.83 OTHER FATIGUE: Primary | ICD-10-CM

## 2025-06-17 LAB
ALBUMIN SERPL-MCNC: 3.9 G/DL (ref 3.5–5.2)
ALBUMIN/GLOB SERPL: 1.5 {RATIO} (ref 1–2.5)
ALP SERPL-CCNC: 105 U/L (ref 35–104)
ALT SERPL-CCNC: 10 U/L (ref 10–35)
ANION GAP SERPL CALCULATED.3IONS-SCNC: 10 MMOL/L (ref 9–16)
AST SERPL-CCNC: 21 U/L (ref 10–35)
BASOPHILS # BLD: <0.03 K/UL (ref 0–0.2)
BASOPHILS NFR BLD: 0 % (ref 0–2)
BILIRUB SERPL-MCNC: <0.2 MG/DL (ref 0–1.2)
BUN SERPL-MCNC: 24 MG/DL (ref 6–20)
BUN/CREAT SERPL: 24 (ref 9–20)
CALCIUM SERPL-MCNC: 9 MG/DL (ref 8.6–10.4)
CHLORIDE SERPL-SCNC: 105 MMOL/L (ref 98–107)
CHOLEST SERPL-MCNC: 267 MG/DL (ref 0–199)
CHOLESTEROL/HDL RATIO: 10.3
CO2 SERPL-SCNC: 26 MMOL/L (ref 20–31)
CREAT SERPL-MCNC: 1 MG/DL (ref 0.6–0.9)
EOSINOPHIL # BLD: 0.41 K/UL (ref 0–0.44)
EOSINOPHILS RELATIVE PERCENT: 7 % (ref 1–4)
ERYTHROCYTE [DISTWIDTH] IN BLOOD BY AUTOMATED COUNT: 13.9 % (ref 11.8–14.4)
EST. AVERAGE GLUCOSE BLD GHB EST-MCNC: 85 MG/DL
FERRITIN SERPL-MCNC: 203 NG/ML (ref 15–150)
GFR, ESTIMATED: 70 ML/MIN/1.73M2
GLUCOSE SERPL-MCNC: 118 MG/DL (ref 74–99)
HBA1C MFR BLD: 4.6 % (ref 4–6)
HCT VFR BLD AUTO: 31.4 % (ref 36.3–47.1)
HDLC SERPL-MCNC: 26 MG/DL
HGB BLD-MCNC: 11.1 G/DL (ref 11.9–15.1)
IMM GRANULOCYTES # BLD AUTO: <0.03 K/UL (ref 0–0.3)
IMM GRANULOCYTES NFR BLD: 0 %
IRON SATN MFR SERPL: 30 % (ref 20–55)
IRON SERPL-MCNC: 69 UG/DL (ref 37–145)
LDLC SERPL CALC-MCNC: ABNORMAL MG/DL (ref 0–100)
LDLC SERPL DIRECT ASSAY-MCNC: 173 MG/DL
LYMPHOCYTES NFR BLD: 1.04 K/UL (ref 1.1–3.7)
LYMPHOCYTES RELATIVE PERCENT: 18 % (ref 24–43)
MCH RBC QN AUTO: 33.6 PG (ref 25.2–33.5)
MCHC RBC AUTO-ENTMCNC: 35.4 G/DL (ref 25.2–33.5)
MCV RBC AUTO: 95.2 FL (ref 82.6–102.9)
MONOCYTES NFR BLD: 0.41 K/UL (ref 0.1–1.2)
MONOCYTES NFR BLD: 7 % (ref 3–12)
NEUTROPHILS NFR BLD: 68 % (ref 36–65)
NEUTS SEG NFR BLD: 3.76 K/UL (ref 1.5–8.1)
NRBC BLD-RTO: 0 PER 100 WBC
PLATELET # BLD AUTO: 132 K/UL (ref 138–453)
PMV BLD AUTO: 8.6 FL (ref 8.1–13.5)
POTASSIUM SERPL-SCNC: 3.8 MMOL/L (ref 3.7–5.3)
PROT SERPL-MCNC: 6.5 G/DL (ref 6.6–8.7)
RBC # BLD AUTO: 3.3 M/UL (ref 3.95–5.11)
SODIUM SERPL-SCNC: 141 MMOL/L (ref 136–145)
TIBC SERPL-MCNC: 232 UG/DL (ref 250–450)
TRIGL SERPL-MCNC: 573 MG/DL (ref 0–149)
TSH SERPL DL<=0.05 MIU/L-ACNC: 1.5 UIU/ML (ref 0.27–4.2)
UNSATURATED IRON BINDING CAPACITY: 163 UG/DL (ref 112–347)
VLDLC SERPL CALC-MCNC: ABNORMAL MG/DL (ref 1–30)
WBC OTHER # BLD: 5.7 K/UL (ref 3.5–11.3)

## 2025-06-17 PROCEDURE — 80061 LIPID PANEL: CPT

## 2025-06-17 PROCEDURE — 83721 ASSAY OF BLOOD LIPOPROTEIN: CPT

## 2025-06-17 PROCEDURE — 2500000003 HC RX 250 WO HCPCS: Performed by: OBSTETRICS & GYNECOLOGY

## 2025-06-17 PROCEDURE — 83036 HEMOGLOBIN GLYCOSYLATED A1C: CPT

## 2025-06-17 PROCEDURE — 83540 ASSAY OF IRON: CPT

## 2025-06-17 PROCEDURE — 84443 ASSAY THYROID STIM HORMONE: CPT

## 2025-06-17 PROCEDURE — 85025 COMPLETE CBC W/AUTO DIFF WBC: CPT

## 2025-06-17 PROCEDURE — 80053 COMPREHEN METABOLIC PANEL: CPT

## 2025-06-17 PROCEDURE — 36591 DRAW BLOOD OFF VENOUS DEVICE: CPT

## 2025-06-17 PROCEDURE — 83921 ORGANIC ACID SINGLE QUANT: CPT

## 2025-06-17 PROCEDURE — 82607 VITAMIN B-12: CPT

## 2025-06-17 PROCEDURE — 83550 IRON BINDING TEST: CPT

## 2025-06-17 PROCEDURE — 6360000002 HC RX W HCPCS: Performed by: OBSTETRICS & GYNECOLOGY

## 2025-06-17 PROCEDURE — 82728 ASSAY OF FERRITIN: CPT

## 2025-06-17 RX ORDER — CEPHALEXIN 500 MG/1
500 CAPSULE ORAL 2 TIMES DAILY
COMMUNITY
Start: 2025-06-12

## 2025-06-17 RX ORDER — SODIUM CHLORIDE 0.9 % (FLUSH) 0.9 %
5-40 SYRINGE (ML) INJECTION PRN
Status: DISCONTINUED | OUTPATIENT
Start: 2025-06-17 | End: 2025-06-18 | Stop reason: HOSPADM

## 2025-06-17 RX ORDER — SODIUM CHLORIDE 0.9 % (FLUSH) 0.9 %
5-40 SYRINGE (ML) INJECTION PRN
OUTPATIENT
Start: 2025-06-17

## 2025-06-17 RX ORDER — SODIUM CHLORIDE 9 MG/ML
25 INJECTION, SOLUTION INTRAVENOUS PRN
OUTPATIENT
Start: 2025-06-17

## 2025-06-17 RX ORDER — HEPARIN 100 UNIT/ML
500 SYRINGE INTRAVENOUS PRN
Status: DISCONTINUED | OUTPATIENT
Start: 2025-06-17 | End: 2025-06-18 | Stop reason: HOSPADM

## 2025-06-17 RX ORDER — HEPARIN 100 UNIT/ML
500 SYRINGE INTRAVENOUS PRN
OUTPATIENT
Start: 2025-06-17

## 2025-06-17 RX ADMIN — SODIUM CHLORIDE, PRESERVATIVE FREE 10 ML: 5 INJECTION INTRAVENOUS at 15:59

## 2025-06-17 RX ADMIN — HEPARIN 500 UNITS: 100 SYRINGE at 15:59

## 2025-06-17 NOTE — PROGRESS NOTES
[facility] INTERNAL MEDICINE    Visit Date:  6/17/2025  Patient:  Evangelina King  YOB: 1980    Assessment & Plan    Fatigue: Will order blood work to assess for causes.  Will consider sleep study.    Obesity: BMI over 40.  Counseled regarding starting GLP-1 agonist, counseled guarding side effects including nausea/vomiting, pancreatitis, thyroid cancer.  She verbalized understanding.  Will consider starting medication after blood work.    Depression: Continue Zoloft.  Reassess next visit.    Cervical squamous cell carcinoma.  Follows with oncology, and gynecology oncology.  Will defer management.    Nephrostomy tube status: Nephrostomy tubes recently removed.  Due to ureteral obstruction secondary to cervical cancer.  Follows with urology    Cancer related pain: Can continue Tylenol with codeine.  Reassess next visit.     Diagnosis Orders   1. Other fatigue  CBC with Auto Differential    Comprehensive Metabolic Panel    TSH reflex to FT4    Vitamin B12    Methylmalonic Acid, Serum    Ferritin    Iron and TIBC      2. Anemia, unspecified type  CBC with Auto Differential    Comprehensive Metabolic Panel    TSH reflex to FT4    Vitamin B12    Methylmalonic Acid, Serum    Ferritin    Iron and TIBC      3. IFG (impaired fasting glucose)  Hemoglobin A1C      4. Screening for lipid disorders  Lipid, Fasting            Chief Complaint  Fatigue (Feeling very try for about 1 week , only change is being on antibiotic from her derm surgery  )    History of Present Illness   Reports that she has been feeling extremely tired for the last 2 weeks.  Does not recall any specific event.  Says that she has trouble falling asleep as well, but is not sure why.  Denies pain, urinary frequency, anxiety as the cause of her not sleeping well.  Says that she feels extremely fatigued during the day as well, says that she is able to sleep any point of time.  She has a history of anemia, and was previously on

## 2025-06-17 NOTE — PROGRESS NOTES
VAD accessed per protocol with 1\" Andre needle. Flushes easily and labs drawn. Flushed with 10 ml of NSS and 5 ml of Heparin flush. VAD D/C'd and Bandaid applied to site. Patient stable and discharged home.

## 2025-06-18 LAB — VIT B12 SERPL-MCNC: 384 PG/ML (ref 232–1245)

## 2025-06-23 LAB — METHYLMALONATE SERPL-SCNC: 0.5 UMOL/L (ref 0–0.4)

## 2025-06-24 ENCOUNTER — RESULTS FOLLOW-UP (OUTPATIENT)
Dept: INTERNAL MEDICINE | Age: 45
End: 2025-06-24

## 2025-06-24 NOTE — PROGRESS NOTES
Patient and her mom and another female here for chemotherapy teaching. Spent 60 minutes with them   Teaching sheets from [facility] and verbal information given on chemotherapy agents, action, administration and side effects.   Provided books chemotherapy and you and Eating Hints: Before During, and After Cancer treatment.  Handout about unit with phone numbers for Infusion Center at [facility],  [facility], and [facility] provided.  Drugs discussed: Taxol, Cisplatin  Discussed implanted port She has used it for infusions  Reviewed anti emetic medications, pt has prescriptions for zofran    Questions answered, support given                                        emotional support provided to patient and family

## 2025-07-07 RX ORDER — SERTRALINE HYDROCHLORIDE 100 MG/1
100 TABLET, FILM COATED ORAL DAILY
Qty: 90 TABLET | Refills: 0 | Status: SHIPPED | OUTPATIENT
Start: 2025-07-07

## 2025-07-24 ENCOUNTER — HOSPITAL ENCOUNTER (OUTPATIENT)
Dept: INFUSION THERAPY | Age: 45
Discharge: HOME | End: 2025-07-24
Payer: COMMERCIAL

## 2025-07-24 DIAGNOSIS — C53.9 PRIMARY CERVICAL SQUAMOUS CELL CARCINOMA (HCC): Primary | ICD-10-CM

## 2025-07-24 PROCEDURE — 6360000002 HC RX W HCPCS: Performed by: NURSE PRACTITIONER

## 2025-07-24 PROCEDURE — 96523 IRRIG DRUG DELIVERY DEVICE: CPT

## 2025-07-24 PROCEDURE — 2500000003 HC RX 250 WO HCPCS: Performed by: NURSE PRACTITIONER

## 2025-07-24 RX ORDER — HEPARIN 100 UNIT/ML
500 SYRINGE INTRAVENOUS PRN
Status: DISCONTINUED | OUTPATIENT
Start: 2025-07-24 | End: 2025-07-25 | Stop reason: HOSPADM

## 2025-07-24 RX ORDER — DEXAMETHASONE SODIUM PHOSPHATE 10 MG/ML
10 INJECTION, SOLUTION INTRAMUSCULAR; INTRAVENOUS ONCE
Start: 2025-07-24 | End: 2025-07-24

## 2025-07-24 RX ORDER — SODIUM CHLORIDE 9 MG/ML
5-250 INJECTION, SOLUTION INTRAVENOUS PRN
OUTPATIENT
Start: 2025-07-24

## 2025-07-24 RX ORDER — SODIUM CHLORIDE 0.9 % (FLUSH) 0.9 %
5-40 SYRINGE (ML) INJECTION PRN
Status: DISCONTINUED | OUTPATIENT
Start: 2025-07-24 | End: 2025-07-25 | Stop reason: HOSPADM

## 2025-07-24 RX ORDER — HEPARIN 100 UNIT/ML
500 SYRINGE INTRAVENOUS PRN
OUTPATIENT
Start: 2025-07-24

## 2025-07-24 RX ORDER — SODIUM CHLORIDE 0.9 % (FLUSH) 0.9 %
5-40 SYRINGE (ML) INJECTION PRN
OUTPATIENT
Start: 2025-07-24

## 2025-07-24 RX ORDER — 0.9 % SODIUM CHLORIDE 0.9 %
1000 INTRAVENOUS SOLUTION INTRAVENOUS ONCE
OUTPATIENT
Start: 2025-07-24 | End: 2025-07-24

## 2025-07-24 RX ADMIN — SODIUM CHLORIDE, PRESERVATIVE FREE 10 ML: 5 INJECTION INTRAVENOUS at 10:20

## 2025-07-24 RX ADMIN — HEPARIN 500 UNITS: 100 SYRINGE at 10:20

## 2025-07-24 NOTE — PROGRESS NOTES
VAD accessed per protocol with 3/4 \" wiseman needle.  Flushed easily with saline.  Blood return noted.  Flushed with 10 ml of NSS and 5 ml of Heparin flush.  VAD D/C'd and Band-Aid to site.  Patient stable and discharged to home.

## 2025-07-26 DIAGNOSIS — T14.8XXA NERVE COMPRESSION: ICD-10-CM

## 2025-07-28 RX ORDER — GABAPENTIN 300 MG/1
CAPSULE ORAL
Qty: 90 CAPSULE | Refills: 2 | Status: SHIPPED | OUTPATIENT
Start: 2025-07-28 | End: 2025-10-26

## 2025-08-05 ENCOUNTER — HOSPITAL ENCOUNTER (OUTPATIENT)
Dept: INTERVENTIONAL RADIOLOGY/VASCULAR | Age: 45
Discharge: HOME | End: 2025-08-07
Attending: UROLOGY
Payer: COMMERCIAL

## 2025-08-05 DIAGNOSIS — N13.30 BILATERAL HYDRONEPHROSIS: ICD-10-CM

## 2025-08-05 PROCEDURE — 76775 US EXAM ABDO BACK WALL LIM: CPT

## 2025-08-20 ENCOUNTER — OFFICE VISIT (OUTPATIENT)
Dept: UROLOGY | Age: 45
End: 2025-08-20
Payer: COMMERCIAL

## 2025-08-20 VITALS
SYSTOLIC BLOOD PRESSURE: 96 MMHG | HEART RATE: 72 BPM | HEIGHT: 67 IN | WEIGHT: 277 LBS | DIASTOLIC BLOOD PRESSURE: 62 MMHG | BODY MASS INDEX: 43.47 KG/M2

## 2025-08-20 DIAGNOSIS — R19.00 PELVIC MASS IN FEMALE: ICD-10-CM

## 2025-08-20 DIAGNOSIS — N13.30 BILATERAL HYDRONEPHROSIS: Primary | ICD-10-CM

## 2025-08-20 PROCEDURE — 99214 OFFICE O/P EST MOD 30 MIN: CPT | Performed by: UROLOGY

## 2025-09-04 ENCOUNTER — HOSPITAL ENCOUNTER (OUTPATIENT)
Dept: INFUSION THERAPY | Age: 45
Discharge: HOME | End: 2025-09-04
Payer: COMMERCIAL

## 2025-09-04 DIAGNOSIS — C53.9 PRIMARY CERVICAL SQUAMOUS CELL CARCINOMA (HCC): Primary | ICD-10-CM

## 2025-09-04 PROCEDURE — 96523 IRRIG DRUG DELIVERY DEVICE: CPT

## 2025-09-04 PROCEDURE — 2500000003 HC RX 250 WO HCPCS: Performed by: OBSTETRICS & GYNECOLOGY

## 2025-09-04 PROCEDURE — 6360000002 HC RX W HCPCS: Performed by: OBSTETRICS & GYNECOLOGY

## 2025-09-04 RX ORDER — SODIUM CHLORIDE 9 MG/ML
25 INJECTION, SOLUTION INTRAVENOUS PRN
OUTPATIENT
Start: 2025-09-04

## 2025-09-04 RX ORDER — HEPARIN 100 UNIT/ML
500 SYRINGE INTRAVENOUS PRN
OUTPATIENT
Start: 2025-09-04

## 2025-09-04 RX ORDER — SODIUM CHLORIDE 0.9 % (FLUSH) 0.9 %
5-40 SYRINGE (ML) INJECTION PRN
OUTPATIENT
Start: 2025-09-04

## 2025-09-04 RX ORDER — SODIUM CHLORIDE 0.9 % (FLUSH) 0.9 %
5-40 SYRINGE (ML) INJECTION PRN
Status: DISCONTINUED | OUTPATIENT
Start: 2025-09-04 | End: 2025-09-05 | Stop reason: HOSPADM

## 2025-09-04 RX ORDER — HEPARIN 100 UNIT/ML
500 SYRINGE INTRAVENOUS PRN
Status: DISCONTINUED | OUTPATIENT
Start: 2025-09-04 | End: 2025-09-05 | Stop reason: HOSPADM

## 2025-09-04 RX ADMIN — SODIUM CHLORIDE, PRESERVATIVE FREE 10 ML: 5 INJECTION INTRAVENOUS at 09:49

## 2025-09-04 RX ADMIN — HEPARIN 500 UNITS: 100 SYRINGE at 09:50

## (undated) DEVICE — SOLUTION SCRB 4OZ 4% CHG CLN BASE FOR PT SKIN ANTISEPSIS

## (undated) DEVICE — GOWN,AURORA,NONREINFORCED,LARGE: Brand: MEDLINE

## (undated) DEVICE — SOLUTION IRRIG 3000ML 0.9% SOD CHL USP UROMATIC PLAS CONT

## (undated) DEVICE — PACK PROCEDURE SURG CYSTO SVMMC LF

## (undated) DEVICE — SOCK SPEC SHT 4.5 IN UNIV CANSTR COMPATIBILITY

## (undated) DEVICE — GLOVE SURG SZ 55 CRM LTX FREE POLYISOPRENE POLYMER BEAD ANTI

## (undated) DEVICE — PAD,NON-ADHERENT,3X8,STERILE,LF,1/PK: Brand: MEDLINE

## (undated) DEVICE — SHEET,DRAPE,70X100,STERILE: Brand: MEDLINE

## (undated) DEVICE — Device

## (undated) DEVICE — SYSTEM WST MGMT AVETA

## (undated) DEVICE — DRAPE,REIN 53X77,STERILE: Brand: MEDLINE

## (undated) DEVICE — SVMMC GYN MIN PK

## (undated) DEVICE — SOLUTION SCRB 4OZ 4% CHG H2O AIDED FOR PREOPERATIVE SKIN

## (undated) DEVICE — Z INACTIVE USE 2660663 SOLUTION IRRIG 1000ML STRIL H2O USP PLAS POUR BTL

## (undated) DEVICE — POUCH INSTR W40XL26IN BUTT FLD COLL FLTR DRNGE PRT

## (undated) DEVICE — STRAP ARMBRD W1.5XL32IN FOAM STR YET SFT W/ HK AND LOOP

## (undated) DEVICE — GOWN,AURORA,NONRNF,XL,30/CS: Brand: MEDLINE

## (undated) DEVICE — MARKER,SKIN,WI/RULER AND LABELS: Brand: MEDLINE

## (undated) DEVICE — 4-PORT MANIFOLD: Brand: NEPTUNE 2

## (undated) DEVICE — GLOVE ORANGE PI 7 1/2   MSG9075

## (undated) DEVICE — PROTECTOR ULN NRV PUR FOAM HK LOOP STRP ANATOMICALLY

## (undated) DEVICE — SOLUTION IV 1000ML 0.9% SOD CHL PH 5 INJ USP VIAFLX PLAS

## (undated) DEVICE — STRAP,POSITIONING,KNEE/BODY,FOAM,4X60": Brand: MEDLINE

## (undated) DEVICE — DRAPE,UNDRBUT,WHT GRAD PCH,CAPPORT,20/CS: Brand: MEDLINE

## (undated) DEVICE — COVER OR TBL W40XL90IN ABSRB STD AND GRIPPY BK SAHARA

## (undated) DEVICE — CYSTO/BLADDER IRRIGATION SET, REGULATING CLAMP

## (undated) DEVICE — CORD LAPAROSCOPIC MONOPOLAR

## (undated) DEVICE — PACK,CYSTOSCOPY,PK VI: Brand: MEDLINE

## (undated) DEVICE — SYSTEM WST MGMT W/ CAP AVETA

## (undated) DEVICE — GLOVE SURG SZ 65 L12IN FNGR THK79MIL GRN LTX FREE

## (undated) DEVICE — WHISTLE TIP URETERAL CATHETER (LEFT): Brand: COOK

## (undated) DEVICE — CATHETER URET 5FR L70CM OPN END SGL LUMN INJ HUB FLEXIMA

## (undated) DEVICE — CONTAINER,SPECIMEN,4OZ,OR STRL: Brand: MEDLINE

## (undated) DEVICE — HYSTEROSCOPE RIGID CORAL AVETA DISP